# Patient Record
Sex: MALE | Race: BLACK OR AFRICAN AMERICAN | NOT HISPANIC OR LATINO | Employment: UNEMPLOYED | URBAN - METROPOLITAN AREA
[De-identification: names, ages, dates, MRNs, and addresses within clinical notes are randomized per-mention and may not be internally consistent; named-entity substitution may affect disease eponyms.]

---

## 2018-03-02 ENCOUNTER — DOCUMENTATION (OUTPATIENT)
Dept: NEUROSURGERY | Facility: CLINIC | Age: 48
End: 2018-03-02

## 2018-03-02 RX ORDER — MIRTAZAPINE 15 MG/1
15 TABLET, ORALLY DISINTEGRATING ORAL NIGHTLY
COMMUNITY
End: 2020-02-11

## 2018-03-02 RX ORDER — ASPIRIN 81 MG/1
81 TABLET, CHEWABLE ORAL DAILY
COMMUNITY
End: 2020-02-11

## 2018-03-02 RX ORDER — ATORVASTATIN CALCIUM 20 MG/1
20 TABLET, FILM COATED ORAL DAILY
COMMUNITY
End: 2023-03-20 | Stop reason: SDUPTHER

## 2018-03-02 RX ORDER — AMLODIPINE BESYLATE 10 MG/1
10 TABLET ORAL DAILY
COMMUNITY

## 2018-03-02 RX ORDER — SERTRALINE HYDROCHLORIDE 100 MG/1
100 TABLET, FILM COATED ORAL DAILY
COMMUNITY
End: 2020-02-11

## 2018-03-02 RX ORDER — LISINOPRIL 20 MG/1
20 TABLET ORAL DAILY
COMMUNITY
End: 2022-09-19

## 2018-03-02 RX ORDER — RANITIDINE 150 MG/1
150 TABLET ORAL 2 TIMES DAILY
COMMUNITY
End: 2020-02-11

## 2018-03-02 RX ORDER — POTASSIUM CHLORIDE 750 MG/1
10 CAPSULE, EXTENDED RELEASE ORAL DAILY
COMMUNITY
End: 2020-02-11

## 2018-03-05 ENCOUNTER — OFFICE VISIT (OUTPATIENT)
Dept: NEUROSURGERY | Facility: CLINIC | Age: 48
End: 2018-03-05

## 2018-03-05 VITALS
DIASTOLIC BLOOD PRESSURE: 74 MMHG | TEMPERATURE: 98.3 F | HEIGHT: 72 IN | OXYGEN SATURATION: 95 % | SYSTOLIC BLOOD PRESSURE: 110 MMHG | BODY MASS INDEX: 25.87 KG/M2 | WEIGHT: 191 LBS | HEART RATE: 72 BPM

## 2018-03-05 DIAGNOSIS — M47.817 LUMBOSACRAL SPONDYLOSIS WITHOUT MYELOPATHY: ICD-10-CM

## 2018-03-05 DIAGNOSIS — M48.061 SPINAL STENOSIS, LUMBAR REGION, WITHOUT NEUROGENIC CLAUDICATION: ICD-10-CM

## 2018-03-05 DIAGNOSIS — M54.50 CHRONIC BILATERAL LOW BACK PAIN WITHOUT SCIATICA: Primary | ICD-10-CM

## 2018-03-05 DIAGNOSIS — G89.29 CHRONIC BILATERAL LOW BACK PAIN WITHOUT SCIATICA: Primary | ICD-10-CM

## 2018-03-05 DIAGNOSIS — Z98.1 S/P LUMBAR FUSION: ICD-10-CM

## 2018-03-05 PROCEDURE — 99244 OFF/OP CNSLTJ NEW/EST MOD 40: CPT | Performed by: NEUROLOGICAL SURGERY

## 2018-03-05 RX ORDER — NAPROXEN 500 MG/1
500 TABLET ORAL 2 TIMES DAILY WITH MEALS
Qty: 60 TABLET | Refills: 3 | Status: SHIPPED | OUTPATIENT
Start: 2018-03-05 | End: 2020-02-11

## 2018-03-05 NOTE — PROGRESS NOTES
Patient: Claude Masterson  :  1970  Chart #:  [id number]    Date of Service: 18    Chief Complaint:   Chief Complaint   Patient presents with   • Back Pain       Back Pain   This is a new (Mr. Masterson is new with me today.  He is  a pleasant 47 year old male who presents today with neck and back pain.) problem. Episode onset: I operated on this patient in  to correct L5 spondylolithesis and a grade II spondylosis.  On 03/02/10 he broke his sacral screws and they were replaced with iliac screws.  Episode frequency: Patient states that recently he had a stroke.  The problem has been gradually worsening (He complains of bilateral hip and groin pain.  He also complains of bilateral leg pain.  He has normal sensation in his feet bilaterally.) since onset. The pain is present in the lumbar spine (He is left handed.  He has pain when his lower extremities are raised.). The quality of the pain is described as aching. Radiates to: Both legs to the feet;  may be worse with standing and walking. The pain is at a severity of 6/10. The pain is mild (His pain is mild to moderate.). The pain is worse during the day. The symptoms are aggravated by position and standing (He is not able to walk for any distance because of his back pain.). Stiffness is present in the morning. Associated symptoms include numbness and weakness. (He had a R Cerebral stroke affecting the left side of his body in 2017.  He is currently doing PT for stroke rehabilitation.) Risk factors include lack of exercise (progressive osteoarthritis). Treatments tried: PT. The treatment provided no relief.     Radiographic Images:   MRI of the lumbar spine dated  shows a solid arthrodesis at L5-S1 with minimal residual spondylolisthesis.  His alignment is excellent.  He has disc dessication and narrowing at the L1-L2, L2-L3 and L3-L4 region.  He has spinal stenosis at L2-L3 and L3-L4.  Hew has type I modic changes at L2-L3 and L3-L4.    Past  Medical History:   Diagnosis Date   • Cerebrovascular disease    • DDD (degenerative disc disease), cervical    • Hypertension    • Stroke      Current Outpatient Prescriptions   Medication Sig Dispense Refill   • amLODIPine (NORVASC) 10 MG tablet Take 10 mg by mouth Daily.     • aspirin 81 MG chewable tablet Chew 81 mg Daily.     • atorvastatin (LIPITOR) 20 MG tablet Take 20 mg by mouth Daily.     • Cyanocobalamin 1000 MCG capsule Take 1,000 mg by mouth Every 30 (Thirty) Days.     • lisinopril (PRINIVIL,ZESTRIL) 20 MG tablet Take 20 mg by mouth Daily.     • mirtazapine (REMERON SOL-TAB) 15 MG disintegrating tablet Take 15 mg by mouth Every Night.     • Ped Multivitamins-Fl-Iron (MULTIVITAMIN WITH FLUORIDE/IRON) 0.25-10 MG/ML solution solution Take  by mouth Daily.     • potassium chloride (MICRO-K) 10 MEQ CR capsule Take 10 mEq by mouth Daily.     • raNITIdine (ZANTAC) 150 MG tablet Take 150 mg by mouth 2 (Two) Times a Day.     • sertraline (ZOLOFT) 100 MG tablet Take 100 mg by mouth Daily.     • naproxen (NAPROSYN) 500 MG tablet Take 1 tablet by mouth 2 (Two) Times a Day With Meals. 60 tablet 3     No current facility-administered medications for this visit.       No Known Allergies  Social History     Social History   • Marital status: Single     Social History Main Topics   • Smoking status: Never Smoker   • Smokeless tobacco: Never Used   • Alcohol use No   • Drug use: No   • Sexual activity: Defer     Family History   Problem Relation Age of Onset   • No Known Problems Mother    • No Known Problems Father      Past Surgical History:   Procedure Laterality Date   • BACK SURGERY  2000    L5-S1   • BACK SURGERY  2009    L5-S1 replace hardware     Review of Systems   Musculoskeletal: Positive for arthralgias, back pain, myalgias, neck pain and neck stiffness.   Neurological: Positive for weakness and numbness.   Psychiatric/Behavioral: Positive for agitation. The patient is nervous/anxious and is hyperactive.   "  All other systems reviewed and are negative.    Vitals:    03/05/18 0958   BP: 110/74   BP Location: Right arm   Patient Position: Sitting   Pulse: 72   Temp: 98.3 °F (36.8 °C)   TempSrc: Temporal Artery    SpO2: 95%   Weight: 86.6 kg (191 lb)   Height: 182.9 cm (72\")     Physical Exam  Neurologic Exam  Physical Exam   Constitutional: The patient is oriented to person, place, and time.  The patient appears well-developed and well-nourished and in no distress.   Neat  healthy male  HENT:   Head: Normocephalic.   Right Ear: Hearing normal.   Left Ear: Hearing normal.   Mouth/Throat: Uvula is midline, oropharynx is clear and moist and mucous membranes are normal.   Eyes: Conjunctivae, EOM and lids are normal. Pupils are equal, round, and reactive to light.   Fundoscopic exam:  The right eye shows no papilledema.   The left eye shows no papilledema.   Pupils 2 mm; Fundi normal   Neck: Trachea normal and normal range of motion. No thyroid mass present.   Cardiovascular: Regular rhythm and normal heart sounds.   No murmur heard.  Pulses:  Carotid pulses are 2+ on the right side, and 2+ on the left side.  Radial pulses are 2+ on the right side, and 2+ on the left side.   Dorsalis pedis pulse is 2+ on the right side  Posterior tibial pulse is 2+ on the left side  Pulmonary/Chest: Effort normal and breath sounds normal.   Musculoskeletal:   Lumbar back: The patient exhibits pain with movement. The patient exhibits slight decreased range of motion, no deformity and no spasm.  Healed lumbar incision   Mild stiffness; SLR increased low back pain; Hip ROM normal        Neurologic Exam      Mental Status   Oriented to person, place, and time.   Attention: normal. Concentration: normal.   Speech: speech is normal   Level of consciousness: alert  Knowledge: good and consistent with education.   Normal comprehension.      Cranial Nerves   Cranial nerves II through XII intact.     Motor Exam   Muscle bulk: normal  Overall muscle " tone: normal except increased in L arm and leg  No Pronator Drift    Strength   Strength 5/5 throughout.      Sensory Exam   Light touch normal.   Proprioception normal.      Gait, Coordination, and Reflexes      Gait: Spastic L leg     Tremor   Resting tremor: absent  Intention tremor: absent  Action tremor: absent     Reflexes   Right biceps: 2+  Left biceps: 2+  Right triceps: 2+  Left triceps: 2+  Right patellar: 2+  Left patellar: 2+  Right achilles: 2+  Left achilles: 2+  Left sided reflexes slightly more brisk than Right side.  No Babinski signs  Right Vergara: absent  Left Vergara: absent  Right ankle clonus: absent  Left ankle clonus: absent  Low frequency tremor in L hand since stroke  GIOVANY normal on R and diminished on L; L handed      Claude was seen today for back pain.    Diagnoses and all orders for this visit:    Chronic bilateral low back pain without sciatica  -     naproxen (NAPROSYN) 500 MG tablet; Take 1 tablet by mouth 2 (Two) Times a Day With Meals.  -     XR Spine Lumbar AP & Lateral With Flex & Ext; Future    Lumbosacral spondylosis without myelopathy  -     naproxen (NAPROSYN) 500 MG tablet; Take 1 tablet by mouth 2 (Two) Times a Day With Meals.  -     XR Spine Lumbar AP & Lateral With Flex & Ext; Future    Spinal stenosis, lumbar region, without neurogenic claudication  Comments:  L2-L4  Orders:  -     naproxen (NAPROSYN) 500 MG tablet; Take 1 tablet by mouth 2 (Two) Times a Day With Meals.  -     XR Spine Lumbar AP & Lateral With Flex & Ext; Future    S/P lumbar fusion  Comments:  L4-S1  Orders:  -     naproxen (NAPROSYN) 500 MG tablet; Take 1 tablet by mouth 2 (Two) Times a Day With Meals.  -     XR Spine Lumbar AP & Lateral With Flex & Ext; Future    Other orders  -     SCANNED - IMAGING  -     SCANNED PATHOLOGY  -     SCANNED - IMAGING  -     SCANNED - IMAGING  -     SCANNED - IMAGING  -     SCANNED - IMAGING  -     SCANNED - IMAGING  -     SCANNED - IMAGING  -     SCANNED - IMAGING  -      SCANNED - IMAGING  -     SCANNED - IMAGING      Plan:  Order lumbar spine x-rays with F&E views;  Prescribe naproxen 500 mg po bid with food;  Continue ranitidine daily;  Continue PT;  Monitor symptoms for review at next visit in 6 weeks.      I, Dr. Elmer Metzger, personally performed the services described in the documentation as scribed in my presence, and the documentation is both accurate and complete.    Elmer Metzger MD

## 2018-03-26 ENCOUNTER — HOSPITAL ENCOUNTER (OUTPATIENT)
Dept: GENERAL RADIOLOGY | Facility: HOSPITAL | Age: 48
Discharge: HOME | End: 2018-03-26
Attending: NEUROLOGICAL SURGERY | Admitting: NEUROLOGICAL SURGERY

## 2018-03-26 DIAGNOSIS — M48.061 SPINAL STENOSIS, LUMBAR REGION, WITHOUT NEUROGENIC CLAUDICATION: ICD-10-CM

## 2018-03-26 DIAGNOSIS — M54.50 CHRONIC BILATERAL LOW BACK PAIN WITHOUT SCIATICA: ICD-10-CM

## 2018-03-26 DIAGNOSIS — G89.29 CHRONIC BILATERAL LOW BACK PAIN WITHOUT SCIATICA: ICD-10-CM

## 2018-03-26 DIAGNOSIS — Z98.1 S/P LUMBAR FUSION: ICD-10-CM

## 2018-03-26 DIAGNOSIS — M47.817 LUMBOSACRAL SPONDYLOSIS WITHOUT MYELOPATHY: ICD-10-CM

## 2018-03-26 PROCEDURE — 72110 X-RAY EXAM L-2 SPINE 4/>VWS: CPT

## 2018-03-27 ENCOUNTER — OFFICE VISIT (OUTPATIENT)
Dept: NEUROSURGERY | Facility: CLINIC | Age: 48
End: 2018-03-27

## 2018-03-27 VITALS
WEIGHT: 193.6 LBS | HEIGHT: 72 IN | SYSTOLIC BLOOD PRESSURE: 110 MMHG | HEART RATE: 62 BPM | OXYGEN SATURATION: 94 % | BODY MASS INDEX: 26.22 KG/M2 | TEMPERATURE: 97.6 F | DIASTOLIC BLOOD PRESSURE: 64 MMHG

## 2018-03-27 DIAGNOSIS — G89.29 CHRONIC BILATERAL LOW BACK PAIN WITHOUT SCIATICA: Primary | ICD-10-CM

## 2018-03-27 DIAGNOSIS — M47.817 LUMBOSACRAL SPONDYLOSIS WITHOUT MYELOPATHY: ICD-10-CM

## 2018-03-27 DIAGNOSIS — M48.061 SPINAL STENOSIS, LUMBAR REGION, WITHOUT NEUROGENIC CLAUDICATION: ICD-10-CM

## 2018-03-27 DIAGNOSIS — Z98.1 S/P LUMBAR FUSION: ICD-10-CM

## 2018-03-27 DIAGNOSIS — M54.50 CHRONIC BILATERAL LOW BACK PAIN WITHOUT SCIATICA: Primary | ICD-10-CM

## 2018-03-27 DIAGNOSIS — M79.18 MYOFASCIAL PAIN SYNDROME: ICD-10-CM

## 2018-03-27 PROCEDURE — 99213 OFFICE O/P EST LOW 20 MIN: CPT | Performed by: NEUROLOGICAL SURGERY

## 2018-03-27 RX ORDER — NABUMETONE 750 MG/1
750 TABLET, FILM COATED ORAL 2 TIMES DAILY
Qty: 60 TABLET | Refills: 3 | Status: SHIPPED | OUTPATIENT
Start: 2018-03-27 | End: 2020-02-11

## 2018-03-27 RX ORDER — DOXEPIN HYDROCHLORIDE 10 MG/1
10 CAPSULE ORAL NIGHTLY
Qty: 30 CAPSULE | Refills: 3 | Status: SHIPPED | OUTPATIENT
Start: 2018-03-27 | End: 2020-02-11

## 2018-03-27 RX ORDER — HYDROCODONE BITARTRATE AND ACETAMINOPHEN 7.5; 325 MG/1; MG/1
1 TABLET ORAL EVERY 8 HOURS PRN
COMMUNITY
End: 2018-10-19

## 2018-03-27 NOTE — PROGRESS NOTES
Patient: Claude Masterson  :  1970  Chart #:  [id number]    Date of Service: 18    Chief Complaint:   Chief Complaint   Patient presents with   • Back Pain       Back Pain   Chronicity: Mr. Masterson returns today for a follow-up on his back pain. The current episode started 1 to 4 weeks ago (On 09 he had a L5-S1 transforminal interbody fusion with capstone cage.). The problem occurs constantly (Most of his pain is in the thoracic region.). The problem is unchanged. The pain is present in the lumbar spine and thoracic spine (worst pain is in thoracic region). The quality of the pain is described as aching. The pain does not radiate. The pain is at a severity of 4/10. The pain is mild. The pain is worse during the day. The symptoms are aggravated by position and standing. Stiffness is present in the morning. Associated symptoms include numbness. (He has trouble sleeping due to the back pain.) He has tried bed rest, heat and NSAIDs (He has been using naproxen which has not helped;  he has used meloxicam in past without much relief.) for the symptoms. The treatment provided mild relief.       Radiographic Images:   MRI of the lumbar spine shows a s/p L4 to the sacrum iliac with fusion.  His instrumentation is intact.  He has degenerative disc disease at L2-L3.  He is fused solid.  There are no halos around the screws.       Past Medical History:   Diagnosis Date   • Cerebrovascular disease    • DDD (degenerative disc disease), cervical    • Hypertension    • Stroke      Current Outpatient Prescriptions   Medication Sig Dispense Refill   • amLODIPine (NORVASC) 10 MG tablet Take 10 mg by mouth Daily.     • aspirin 81 MG chewable tablet Chew 81 mg Daily.     • atorvastatin (LIPITOR) 20 MG tablet Take 20 mg by mouth Daily.     • Cyanocobalamin 1000 MCG capsule Take 1,000 mg by mouth Every 30 (Thirty) Days.     • HYDROcodone-acetaminophen (NORCO) 7.5-325 MG per tablet Take 1 tablet by mouth Every 8 (Eight) Hours  "As Needed for Moderate Pain .     • lisinopril (PRINIVIL,ZESTRIL) 20 MG tablet Take 20 mg by mouth Daily.     • mirtazapine (REMERON SOL-TAB) 15 MG disintegrating tablet Take 15 mg by mouth Every Night.     • naproxen (NAPROSYN) 500 MG tablet Take 1 tablet by mouth 2 (Two) Times a Day With Meals. 60 tablet 3   • Ped Multivitamins-Fl-Iron (MULTIVITAMIN WITH FLUORIDE/IRON) 0.25-10 MG/ML solution solution Take  by mouth Daily.     • potassium chloride (MICRO-K) 10 MEQ CR capsule Take 10 mEq by mouth Daily.     • raNITIdine (ZANTAC) 150 MG tablet Take 150 mg by mouth 2 (Two) Times a Day.     • sertraline (ZOLOFT) 100 MG tablet Take 100 mg by mouth Daily.     • doxepin (SINEquan) 10 MG capsule Take 1 capsule by mouth Every Night. 30 capsule 3   • nabumetone (RELAFEN) 750 MG tablet Take 1 tablet by mouth 2 (Two) Times a Day. With food 60 tablet 3     No current facility-administered medications for this visit.       No Known Allergies  Social History     Social History   • Marital status: Single     Social History Main Topics   • Smoking status: Never Smoker   • Smokeless tobacco: Never Used   • Alcohol use No   • Drug use: No   • Sexual activity: Defer     Other Topics Concern   • Not on file     Family History   Problem Relation Age of Onset   • No Known Problems Mother    • No Known Problems Father      Past Surgical History:   Procedure Laterality Date   • BACK SURGERY  2000    L5-S1   • BACK SURGERY  2009    L5-S1 replace hardware     Review of Systems   Constitutional: Positive for activity change.   Musculoskeletal: Positive for arthralgias, back pain, myalgias and neck pain.   Neurological: Positive for numbness.   All other systems reviewed and are negative.    Vitals:    03/27/18 0918   BP: 110/64   BP Location: Right arm   Patient Position: Sitting   Pulse: 62   Temp: 97.6 °F (36.4 °C)   TempSrc: Temporal Artery    SpO2: 94%   Weight: 87.8 kg (193 lb 9.6 oz)   Height: 182.9 cm (72.01\")     Physical " Exam  Neurologic Exam  Constitutional: The patient is oriented to person, place, and time.  The patient appears well-developed and well-nourished and in no distress.   Neat  healthy male  Neck: Trachea normal and normal range of motion. No thyroid mass present.   Cardiovascular: Regular rhythm   Pulses:  Carotid pulses are 2+ on the right side, and 2+ on the left side.  Radial pulses are 2+ on the right side, and 2+ on the left side.   Dorsalis pedis pulse is 2+ on the right side  Posterior tibial pulse is 2+ on the left side  Pulmonary/Chest: Effort normal   Musculoskeletal:   Thoracic back:  Normal alignment;  Pain in mid thoracic region;  Paraspinal muscles sore to palpation.  Lumbar back: The patient exhibits pain with movement. The patient exhibits slight decreased range of motion, no deformity and no spasm.  Healed lumbar incision   Mild stiffness; SLR increased low back pain; Hip ROM normal        Neurologic Exam      Mental Status   Oriented to person, place, and time.   Attention: normal. Concentration: normal.   Speech: speech is normal   Level of consciousness: alert  Knowledge: good and consistent with education.   Normal comprehension.      Cranial Nerves   Cranial nerves II through XII intact.      Motor Exam   Muscle bulk: normal  Overall muscle tone: normal except increased in L arm and leg  No Pronator Drift     Strength   Strength 5/5 throughout.      Sensory Exam   Light touch normal.   Proprioception normal.      Gait, Coordination, and Reflexes      Gait: Spastic L leg     Tremor   Resting tremor: absent  Intention tremor: absent  Action tremor: absent     Reflexes   Right biceps: 2+  Left biceps: 2+  Right triceps: 2+  Left triceps: 2+  Right patellar: 2+  Left patellar: 2+  Right achilles: 2+  Left achilles: 2+  Left sided reflexes slightly more brisk than Right side.  No Babinski signs  Right Vergara: absent  Left Vergara: absent  Right ankle clonus: absent  Left ankle clonus: absent  Low  frequency tremor in L hand since stroke  GIOVANY normal on R and diminished on L; L handed      [place] was seen today for back pain.    Diagnoses and all orders for this visit:    Chronic bilateral low back pain without sciatica  -     nabumetone (RELAFEN) 750 MG tablet; Take 1 tablet by mouth 2 (Two) Times a Day. With food  -     doxepin (SINEquan) 10 MG capsule; Take 1 capsule by mouth Every Night.    Lumbosacral spondylosis without myelopathy  -     nabumetone (RELAFEN) 750 MG tablet; Take 1 tablet by mouth 2 (Two) Times a Day. With food  -     doxepin (SINEquan) 10 MG capsule; Take 1 capsule by mouth Every Night.    Spinal stenosis, lumbar region, without neurogenic claudication  -     nabumetone (RELAFEN) 750 MG tablet; Take 1 tablet by mouth 2 (Two) Times a Day. With food  -     doxepin (SINEquan) 10 MG capsule; Take 1 capsule by mouth Every Night.    S/P lumbar fusion  -     nabumetone (RELAFEN) 750 MG tablet; Take 1 tablet by mouth 2 (Two) Times a Day. With food  -     doxepin (SINEquan) 10 MG capsule; Take 1 capsule by mouth Every Night.    Myofascial pain syndrome      Plan:   Prescribe relafen for arthritis pain and doxepin for myofascial pain and sleep;  Apply ice to back pain;  Return for re-evaluation in 4 - 6 weeks.  I do not recommend any surgery at this time.  I have discussed this with the patient.    I, Dr. Elmer Metzger, personally performed the services described in the documentation as scribed in my presence, and the documentation is both accurate and complete.    Elmer Metzger MD

## 2018-04-25 ENCOUNTER — OFFICE VISIT (OUTPATIENT)
Dept: NEUROLOGY | Facility: CLINIC | Age: 48
End: 2018-04-25

## 2018-04-25 VITALS
SYSTOLIC BLOOD PRESSURE: 120 MMHG | HEART RATE: 59 BPM | WEIGHT: 193 LBS | DIASTOLIC BLOOD PRESSURE: 74 MMHG | HEIGHT: 72 IN | OXYGEN SATURATION: 98 % | BODY MASS INDEX: 26.14 KG/M2

## 2018-04-25 DIAGNOSIS — R48.1: ICD-10-CM

## 2018-04-25 DIAGNOSIS — I69.30 SEQUELAE, POST-STROKE: Primary | ICD-10-CM

## 2018-04-25 PROCEDURE — 99244 OFF/OP CNSLTJ NEW/EST MOD 40: CPT | Performed by: PSYCHIATRY & NEUROLOGY

## 2018-04-25 RX ORDER — CLOPIDOGREL BISULFATE 75 MG/1
75 TABLET ORAL DAILY
COMMUNITY

## 2018-04-25 RX ORDER — TIZANIDINE 4 MG/1
4 TABLET ORAL NIGHTLY PRN
COMMUNITY
End: 2018-06-18 | Stop reason: ALTCHOICE

## 2018-04-25 NOTE — PROGRESS NOTES
[facility] NEUROLOGY [place] CONSULTATION   History of Present Illness     Date: 4/25/2018    Patient Identification  Claude Masterson is a 47 y.o. male.    Patient information was obtained from patient.  History/Exam limitations: none.    CONSULTATION requested by: Marilin Us APRN      Chief Complaint   Stroke (NP, in office today for consult. Stroke 08/27/2017, pt c/o that sx have worsened. Sx include weakness in Right leg, LBP, pt states if he sits a certain way his toe will start twitching )      History of Present Illness   Mr. Masterson is a 47 year old male with a pmh of hypertension and a stroke on 08/27/17. When he woke up on the morning of his stroke he could not feel his entire left side so he had his girlfriend take him to [place]. He stayed for 7 days and was then referred to [facility] where he stayed for 14 days. Since leaving [facility] his symptoms have gotten worse. He reports tingling in his right foot, bilateral lower extremity numbness, and pain in his back and neck. He has regained some mobility in his legs, but is still not as mobile as he was before the stroke. He reports no family history of stroke but both parents do have high blood pressures. He reports his blood pressure has been known to run 180/110 but it has been under control recently usually staying around 110/70.    PMH:   Past Medical History:   Diagnosis Date   • Anxiety and depression    • Arthritis    • Cerebrovascular disease    • DDD (degenerative disc disease), cervical    • Hyperlipidemia    • Hypertension    • Stroke        Past Surgical History:   Past Surgical History:   Procedure Laterality Date   • BACK SURGERY  2000    L5-S1   • BACK SURGERY  2009    L5-S1 replace hardware       Family Hisotry:   Family History   Problem Relation Age of Onset   • Hypertension Mother    • Hypertension Father        Social History:   Social History     Social History   • Marital status: Single     Spouse name: N/A   • Number  of children: N/A   • Years of education: N/A     Occupational History   • Not on file.     Social History Main Topics   • Smoking status: Never Smoker   • Smokeless tobacco: Never Used   • Alcohol use No   • Drug use: No   • Sexual activity: Defer     Other Topics Concern   • Not on file     Social History Narrative   • No narrative on file       Medications:   Current Outpatient Prescriptions   Medication Sig Dispense Refill   • amLODIPine (NORVASC) 10 MG tablet Take 10 mg by mouth Daily.     • aspirin 81 MG chewable tablet Chew 81 mg Daily.     • atorvastatin (LIPITOR) 20 MG tablet Take 20 mg by mouth Daily.     • clopidogrel (PLAVIX) 75 MG tablet Take 75 mg by mouth Daily.     • doxepin (SINEquan) 10 MG capsule Take 1 capsule by mouth Every Night. 30 capsule 3   • HYDROcodone-acetaminophen (NORCO) 7.5-325 MG per tablet Take 1 tablet by mouth Every 8 (Eight) Hours As Needed for Moderate Pain .     • lisinopril (PRINIVIL,ZESTRIL) 20 MG tablet Take 20 mg by mouth Daily.     • mirtazapine (REMERON SOL-TAB) 15 MG disintegrating tablet Take 15 mg by mouth Every Night.     • nabumetone (RELAFEN) 750 MG tablet Take 1 tablet by mouth 2 (Two) Times a Day. With food 60 tablet 3   • naproxen (NAPROSYN) 500 MG tablet Take 1 tablet by mouth 2 (Two) Times a Day With Meals. 60 tablet 3   • Ped Multivitamins-Fl-Iron (MULTIVITAMIN WITH FLUORIDE/IRON) 0.25-10 MG/ML solution solution Take  by mouth Daily.     • potassium chloride (MICRO-K) 10 MEQ CR capsule Take 10 mEq by mouth Daily.     • raNITIdine (ZANTAC) 150 MG tablet Take 150 mg by mouth 2 (Two) Times a Day.     • sertraline (ZOLOFT) 100 MG tablet Take 100 mg by mouth Daily.     • tiZANidine (ZANAFLEX) 4 MG tablet Take 4 mg by mouth At Night As Needed for Muscle Spasms.     • Cyanocobalamin 1000 MCG capsule Take 1,000 mg by mouth Every 30 (Thirty) Days.       No current facility-administered medications for this visit.        Allergy: No Known Allergies    Review of  "Systems:  Review of Systems   Constitutional: Positive for fatigue. Negative for chills and fever.   HENT: Negative for congestion, ear pain, hearing loss, rhinorrhea and sore throat.    Eyes: Negative for pain, discharge and redness.   Respiratory: Negative for cough, shortness of breath, wheezing and stridor.    Cardiovascular: Negative for chest pain, palpitations and leg swelling.   Gastrointestinal: Negative for abdominal pain, constipation, nausea and vomiting.   Endocrine: Negative for cold intolerance, heat intolerance and polyphagia.   Genitourinary: Negative for dysuria, flank pain, frequency and urgency.   Musculoskeletal: Positive for gait problem. Negative for joint swelling, myalgias, neck pain and neck stiffness.   Skin: Negative for pallor, rash and wound.   Allergic/Immunologic: Negative for environmental allergies.   Neurological: Positive for weakness and numbness. Negative for dizziness, tremors, seizures, syncope, facial asymmetry, speech difficulty, light-headedness and headaches.   Hematological: Negative for adenopathy.   Psychiatric/Behavioral: Negative for confusion and hallucinations. The patient is not nervous/anxious.        Physical Exam     Vitals:    04/25/18 1107   BP: 120/74   Pulse: 59   SpO2: 98%   Weight: 87.5 kg (193 lb)   Height: 182.9 cm (72\")     GENERAL: Patient is pleasant, cooperative, appears to be stated age.  Body habitus is endomorphic.  SKIN AND EXTREMITIES:  No skin rashes or lesions are noted.  No cyanosis, clubbing or edema of the extremities.    HEAD:  Head is normocephalic and atraumatic.    NECK: Neck are non-tender without thyromegaly or adenopathy.  Carotic upstrokes are 1+/4.  No cranial or cervical bruits.  The neck is supple with a full range of motion.   ENT: palate elevate symmetrically, no evidence of high arch palate, tongue midline erythema in posterior pharynx, Mallampati Classification Class III   CARDIOVASCULAR:  Regular rate and rhythm with normal " S1 and S2 without rub or gallop.  RESPIRATORY:  Clear to auscultation without wheezes or crackle   ABDOMEN:  Soft and non-tender, positive bowel sound without hepatosplenomegaly  BACK:  Back is straight without midline defect.    PSYCH:  Higher cortical function/mental status:  The patient is alert.  She is oriented x3 to time, place and person.  Recent and the remote memory appear normal.  The patient has a good fund of knowledge.  There is no visual or auditory hallucination or suicidal or homicidal ideation.  SPEECH:There is no gross evidence of aphasia, dysarthria or agnosia.      CRANIAL NERVES:  Pupils are 4mm, equal round reactive to light, reacting briskly to 2mm without afferent pupillary defect.  Visual fields are intact to confrontation testing.  Fundoscopic examination reveals sharp disk margins with normal vasculature.  No papilledema, hemorrhages or exudates.  Extraocular movements are full and smooth with normal pursuits and saccades.  No nystagmus noted.  The face is symmetric. palate elevate symmetrically, Tongue midline, positive gag reflex. The remainder of the cranial nerves are intact and symmetrical.    MOTOR: Pronator drift on Barr[place] on the left   Deep Tendon Reflexes: are 2/4 and symmetrical in the upper extremities, 2/4 and symmetrical at the knees and 1/4 and symmetrical at the Achilles tendon.  Plantar responses were down-going bilaterally.    SENSATION: Decreased primary sensation in the left upper and lower extremities and decreased cortical sensation including agraphesthesia and astereoagnosia    COORDINATION AND GAIT:  The patient walks with a narrow-based gait.  Patient is able to heel-toe and tandem walk forward and backwards without difficulty.  Romberg and monopedal  Romberg are negative.    MUSCULOSKELETAL: Range of motion normal, no clubbing, cyanosis, or edema.  No joint swelling.            Records Reviewed: I have personally reviewed his previous medical record.    Claude was  seen today for stroke.    Diagnoses and all orders for this visit:    Sequelae, post-stroke    Sensory agnosia        Treatments:  1.  Continue patient on aspirin  2.  Continue patient on Lipitor  3.  Periodic monitoring of his blood pressure  4.  Continue antihypertensive medication  Discussion:  The most common cause of stroke is an embolus that occludes an artery in the brain. This usually arises from the carotid arteries or from the vertebral-basilar arteries. Another common cause is from a thrombus that has originated from the left atrium of the heart due to atrial fibrillation. Other reasons include excessive narrowing of large vessels resulting from an atherosclerotic plaque and increased blood viscosity caused by hypercoagulable state. Stroke is related to other medical conditions such as hypertension, heart disease (especially atrial fibrillation , migraine, hypercholesterolemia, and diabetes mellitus .  Risk factors for Stroke include  · Family history of stroke substantially increases risk.  · People 55 years or older are at higher risk.  · Males have a slightly higher risk of stroke than females but females are more likely to die from a stroke.  · High blood pressure  · Diabetes Mellitus  ·  Americans generally tend to have a high risk of dying from a stroke, chiefly due to high blood pressure and uncontrolled diabetes.  · Cigarette smoking  The mainstay of treatment following acute recovery from a stroke depends on the underlying cause. It is not always immediately possible to tell the difference between a CVA and a TIA. A TIA can be considered as the last warning. The reason for the condition should be immediately examined by imaging of the brain.   · To reduce risk of recurrence, patients are recommended to undergo lifestyle changes including quitting smoking, losing weight, eating more fruits and vegetables and exercising regularly  · The use of anti-coagulant  medications, heparin  and  warfarin, or anti-platelet medications such as aspirin may be warranted. The initial treatment is aspirin, second line is clopidogrel (PLAVIX), third line is ticlopidine. If stroke is recurrent after aspirin treatment, the combination of aspirin and dipyridamole is needed (Aggrenox).      This Document is signed by Matheus Aleman MD, FAAN, FAASM April 25, 20189:11 PM

## 2018-06-18 ENCOUNTER — OFFICE VISIT (OUTPATIENT)
Dept: NEUROSURGERY | Facility: CLINIC | Age: 48
End: 2018-06-18

## 2018-06-18 VITALS
TEMPERATURE: 98.1 F | DIASTOLIC BLOOD PRESSURE: 82 MMHG | HEIGHT: 72 IN | BODY MASS INDEX: 25.79 KG/M2 | WEIGHT: 190.4 LBS | SYSTOLIC BLOOD PRESSURE: 120 MMHG

## 2018-06-18 DIAGNOSIS — M54.50 CHRONIC BILATERAL LOW BACK PAIN WITHOUT SCIATICA: Primary | ICD-10-CM

## 2018-06-18 DIAGNOSIS — G89.29 CHRONIC BILATERAL LOW BACK PAIN WITHOUT SCIATICA: Primary | ICD-10-CM

## 2018-06-18 DIAGNOSIS — Z98.1 S/P LUMBAR FUSION: ICD-10-CM

## 2018-06-18 PROCEDURE — 99214 OFFICE O/P EST MOD 30 MIN: CPT | Performed by: PHYSICIAN ASSISTANT

## 2018-06-18 RX ORDER — METHYLPREDNISOLONE 4 MG/1
TABLET ORAL
Qty: 21 TABLET | Refills: 0 | Status: SHIPPED | OUTPATIENT
Start: 2018-06-18 | End: 2018-07-23

## 2018-06-18 RX ORDER — CYCLOBENZAPRINE HCL 10 MG
10 TABLET ORAL 3 TIMES DAILY PRN
Qty: 45 TABLET | Refills: 0 | Status: SHIPPED | OUTPATIENT
Start: 2018-06-18 | End: 2020-02-11

## 2018-06-18 NOTE — PATIENT INSTRUCTIONS
Patient will follow up in 4-6 weeks with Dr. Metzger. He will undergo PT, start medrol dose edson, and try a muscle relaxant in the interim.

## 2018-06-18 NOTE — PROGRESS NOTES
"F/U Visit  Subjective   Patient ID: Claude Masterson is a 47 y.o. male  [id number]    Chief Complaint   Patient presents with   • Back Pain   • Neck Pain     History of Present Illness  Patient is a pleasant 47 year old male who is well known to Dr. Metzger's service. Patient was last seen in march with complaints of thoracic pain which started 1-4 weeks ago. He has previously underwent a L5-S1 tranforminal interbody fusion with capstone cage in 2009. MRI at the time showed a s/p L4 to the sacrum iliac with fusion along with DDD at L2-3.     Patient returns today reporting that he is still experiencing thoracic pain. Describes the pain as sharp.shooting. He reports stretching does not help. He reports that occasionally heat helps and when he lays in certain positions. He reports that the pain is constant, but later said that it comes and go's. He denies any weakness. He says that otherwise the pain is not positional.     Patient also endorses right wrist \"pulsations\" that come and go. He also endorses right foot and right toe numbness/tingling. He states that these symptoms began 2.5 weeks ago.     No B/B incontinence.     Past Medical History:   Diagnosis Date   • Anxiety and depression    • Arthritis    • Cerebrovascular disease    • DDD (degenerative disc disease), cervical    • Hyperlipidemia    • Hypertension    • Stroke        No Known Allergies    Current Outpatient Prescriptions:   •  amLODIPine (NORVASC) 10 MG tablet, Take 10 mg by mouth Daily., Disp: , Rfl:   •  aspirin 81 MG chewable tablet, Chew 81 mg Daily., Disp: , Rfl:   •  atorvastatin (LIPITOR) 20 MG tablet, Take 20 mg by mouth Daily., Disp: , Rfl:   •  clopidogrel (PLAVIX) 75 MG tablet, Take 75 mg by mouth Daily., Disp: , Rfl:   •  Cyanocobalamin 1000 MCG capsule, Take 1,000 mg by mouth Every 30 (Thirty) Days., Disp: , Rfl:   •  doxepin (SINEquan) 10 MG capsule, Take 1 capsule by mouth Every Night., Disp: 30 capsule, Rfl: 3  •  HYDROcodone-acetaminophen " (NORCO) 7.5-325 MG per tablet, Take 1 tablet by mouth Every 8 (Eight) Hours As Needed for Moderate Pain ., Disp: , Rfl:   •  lisinopril (PRINIVIL,ZESTRIL) 20 MG tablet, Take 20 mg by mouth Daily., Disp: , Rfl:   •  mirtazapine (REMERON SOL-TAB) 15 MG disintegrating tablet, Take 15 mg by mouth Every Night., Disp: , Rfl:   •  nabumetone (RELAFEN) 750 MG tablet, Take 1 tablet by mouth 2 (Two) Times a Day. With food, Disp: 60 tablet, Rfl: 3  •  naproxen (NAPROSYN) 500 MG tablet, Take 1 tablet by mouth 2 (Two) Times a Day With Meals., Disp: 60 tablet, Rfl: 3  •  Ped Multivitamins-Fl-Iron (MULTIVITAMIN WITH FLUORIDE/IRON) 0.25-10 MG/ML solution solution, Take  by mouth Daily., Disp: , Rfl:   •  potassium chloride (MICRO-K) 10 MEQ CR capsule, Take 10 mEq by mouth Daily., Disp: , Rfl:   •  raNITIdine (ZANTAC) 150 MG tablet, Take 150 mg by mouth 2 (Two) Times a Day., Disp: , Rfl:   •  sertraline (ZOLOFT) 100 MG tablet, Take 100 mg by mouth Daily., Disp: , Rfl:   •  tiZANidine (ZANAFLEX) 4 MG tablet, Take 4 mg by mouth At Night As Needed for Muscle Spasms., Disp: , Rfl:   Social History   Substance Use Topics   • Smoking status: Never Smoker   • Smokeless tobacco: Never Used   • Alcohol use No     Review of Systems   Constitutional: Negative for activity change, appetite change, chills, diaphoresis, fatigue, fever and unexpected weight change.   HENT: Negative for congestion, dental problem, drooling, ear discharge, ear pain, facial swelling, hearing loss, mouth sores, nosebleeds, postnasal drip, rhinorrhea, sinus pressure, sneezing, sore throat, tinnitus, trouble swallowing and voice change.    Eyes: Negative for photophobia, pain, discharge, redness, itching and visual disturbance.   Respiratory: Negative for apnea, cough, choking, chest tightness, shortness of breath, wheezing and stridor.    Cardiovascular: Negative for chest pain, palpitations and leg swelling.   Gastrointestinal: Negative for abdominal distention,  abdominal pain, anal bleeding, blood in stool, constipation, diarrhea, nausea, rectal pain and vomiting.   Endocrine: Negative for cold intolerance, heat intolerance, polydipsia, polyphagia and polyuria.   Genitourinary: Negative for decreased urine volume, difficulty urinating, dysuria, enuresis, flank pain, frequency, genital sores, hematuria and urgency.   Musculoskeletal: Positive for arthralgias, back pain, myalgias and neck pain. Negative for gait problem, joint swelling and neck stiffness.   Skin: Negative for color change, pallor, rash and wound.   Allergic/Immunologic: Negative for environmental allergies, food allergies and immunocompromised state.   Neurological: Positive for numbness. Negative for dizziness, tremors, seizures, syncope, facial asymmetry, speech difficulty, weakness, light-headedness and headaches.   Hematological: Negative for adenopathy. Does not bruise/bleed easily.   Psychiatric/Behavioral: Positive for agitation. Negative for behavioral problems, confusion, decreased concentration, dysphoric mood, hallucinations, self-injury, sleep disturbance and suicidal ideas. The patient is nervous/anxious. The patient is not hyperactive.        Physical Exam   Constitutional: He is oriented to person, place, and time. He appears well-developed and well-nourished.   HENT:   Head: Normocephalic and atraumatic.   Neck: Normal range of motion.   Pulmonary/Chest: Effort normal.   Musculoskeletal: Normal range of motion. He exhibits no edema, tenderness or deformity.   Neurological: He is alert and oriented to person, place, and time. He has normal strength. He displays normal reflexes. No sensory deficit. He exhibits normal muscle tone. Coordination normal.   Skin: Skin is warm and dry.   Psychiatric: He has a normal mood and affect. His speech is normal and behavior is normal. Judgment and thought content normal.     /82 (BP Location: Right arm, Patient Position: Sitting)   Temp 98.1 °F (36.7  "°C) (Temporal Artery )   Ht 182.9 cm (72.01\")   Wt 86.4 kg (190 lb 6.4 oz)   BMI 25.82 kg/m²     Neurologic Exam     Mental Status   Oriented to person, place, and time.   Attention: normal.   Speech: speech is normal     Motor Exam   Muscle bulk: normal  Overall muscle tone: normal  Right arm tone: normal  Left arm tone: normal  Right leg tone: normal  Left leg tone: normal    Strength   Strength 5/5 throughout.     Gait, Coordination, and Reflexes     Reflexes   Right ankle clonus: absent  Left ankle clonus: absent      Independent Review of Radiographic Studies:   Xray imaging:   IMPRESSION:  There is no evidence of malalignment in either flexion or  extension imaging with degenerative changes more pronounced at the L2-L3  and L3-L4 levels in the interval. There is no hardware complication.    Medical Decision Making:   Patient is a pleasant 47 year old male who presents today for ongoing thoracic back pain along with now associated numbness/tingling in high right leg and right big toe. From the office notes, this pain has been ongoing since March. Patient reports that the N/T is recent over the past 2.5 weeks. He denies any associated weakness. Patient's story was slightly inconsistent and     I have ordered a course of PT, put in for flexeril TID PRN, and a course of steroids.     Patient was advised to stop all NSAID products, except his aspirin. And to take steroids/aspirin with food.     Patient will follow up in 4-6 weeks or sooner if symptoms dramatically worsen. Patient is in agreement with plan.     Maureen Lopez MA                  "

## 2018-07-13 ENCOUNTER — TREATMENT (OUTPATIENT)
Dept: PHYSICAL THERAPY | Facility: CLINIC | Age: 48
End: 2018-07-13

## 2018-07-13 DIAGNOSIS — M54.50 CHRONIC BILATERAL LOW BACK PAIN WITHOUT SCIATICA: ICD-10-CM

## 2018-07-13 DIAGNOSIS — G89.29 CHRONIC BILATERAL LOW BACK PAIN WITHOUT SCIATICA: ICD-10-CM

## 2018-07-13 DIAGNOSIS — G89.29 CHRONIC MIDLINE THORACIC BACK PAIN: Primary | ICD-10-CM

## 2018-07-13 DIAGNOSIS — M54.6 CHRONIC MIDLINE THORACIC BACK PAIN: Primary | ICD-10-CM

## 2018-07-13 PROCEDURE — 97162 PT EVAL MOD COMPLEX 30 MIN: CPT | Performed by: PHYSICAL THERAPIST

## 2018-07-23 ENCOUNTER — OFFICE VISIT (OUTPATIENT)
Dept: NEUROSURGERY | Facility: CLINIC | Age: 48
End: 2018-07-23

## 2018-07-23 VITALS
HEIGHT: 72 IN | OXYGEN SATURATION: 94 % | HEART RATE: 79 BPM | BODY MASS INDEX: 26.79 KG/M2 | TEMPERATURE: 98.1 F | DIASTOLIC BLOOD PRESSURE: 80 MMHG | WEIGHT: 197.8 LBS | SYSTOLIC BLOOD PRESSURE: 112 MMHG

## 2018-07-23 DIAGNOSIS — M79.18 MYOFASCIAL PAIN SYNDROME: Primary | ICD-10-CM

## 2018-07-23 DIAGNOSIS — Z98.1 S/P LUMBAR FUSION: ICD-10-CM

## 2018-07-23 DIAGNOSIS — M54.50 CHRONIC BILATERAL LOW BACK PAIN WITHOUT SCIATICA: ICD-10-CM

## 2018-07-23 DIAGNOSIS — M47.817 LUMBOSACRAL SPONDYLOSIS WITHOUT MYELOPATHY: ICD-10-CM

## 2018-07-23 DIAGNOSIS — G89.29 CHRONIC BILATERAL LOW BACK PAIN WITHOUT SCIATICA: ICD-10-CM

## 2018-07-23 DIAGNOSIS — M48.061 SPINAL STENOSIS, LUMBAR REGION, WITHOUT NEUROGENIC CLAUDICATION: ICD-10-CM

## 2018-07-23 PROCEDURE — 99213 OFFICE O/P EST LOW 20 MIN: CPT | Performed by: NEUROLOGICAL SURGERY

## 2018-07-23 RX ORDER — DOXEPIN HYDROCHLORIDE 25 MG/1
25 CAPSULE ORAL NIGHTLY
Qty: 30 CAPSULE | Refills: 3 | Status: SHIPPED | OUTPATIENT
Start: 2018-07-23 | End: 2020-02-11

## 2018-07-23 NOTE — PROGRESS NOTES
Patient: Claude Masterson  :  1970  Chart #:  [id number]    Date of Service: 18    Chief Complaint:   Chief Complaint   Patient presents with   • Back Pain       Back Pain   Chronicity:  Mr. Masterson returns today for a follow-up on his back pain. The current episode started 1 to 4 weeks ago (On 09 he had a L5-S1 transforminal interbody fusion with capstone cage. The current episode started more than 1 month ago. The problem occurs daily. The problem has been gradually improving since onset. The pain is present in the lumbar spine. The quality of the pain is described as aching. The pain radiates to the left thigh (Patient complains of pain in his LLE.  He also complains of hip and groin pain. ). The pain is at a severity of 4/10. The pain is mild. The symptoms are aggravated by position (He has pain when his lower extremities are raised.). Risk factors include lack of exercise and sedentary lifestyle. He has tried bed rest, home exercises and walking for the symptoms. The treatment provided moderate relief.       Radiographic Images:    MRI of the lumbar spine dated  shows a solid arthrodesis at L5-S1 with minimal residual spondylolisthesis.  His alignment is excellent.  He has disc dessication and narrowing at the L1-L2, L2-L3 and L3-L4 region.  He has spinal stenosis at L2-L3 and L3-L4.  Hew has type I modic changes at L2-L3 and L3-L4.     Lumbar spine x-rays done 3/26/18 show pedicle screw instrumentation from L4 to S1 with iliac screw fixation.  There is spondylosis at L2L3 and L3L4.      Past Medical History:   Diagnosis Date   • Anxiety    • Anxiety and depression    • Arthritis    • Cerebrovascular disease    • DDD (degenerative disc disease), cervical    • Depression    • Hyperlipidemia    • Hypertension    • Injury of back    • Stroke (CMS/[facility])      Current Outpatient Prescriptions   Medication Sig Dispense Refill   • amLODIPine (NORVASC) 10 MG tablet Take 10 mg by mouth Daily.     •  aspirin 81 MG chewable tablet Chew 81 mg Daily.     • atorvastatin (LIPITOR) 20 MG tablet Take 20 mg by mouth Daily.     • clopidogrel (PLAVIX) 75 MG tablet Take 75 mg by mouth Daily.     • Cyanocobalamin 1000 MCG capsule Take 1,000 mg by mouth Every 30 (Thirty) Days.     • cyclobenzaprine (FLEXERIL) 10 MG tablet Take 1 tablet by mouth 3 (Three) Times a Day As Needed for Muscle Spasms. 45 tablet 0   • doxepin (SINEquan) 10 MG capsule Take 1 capsule by mouth Every Night. 30 capsule 3   • HYDROcodone-acetaminophen (NORCO) 7.5-325 MG per tablet Take 1 tablet by mouth Every 8 (Eight) Hours As Needed for Moderate Pain .     • lisinopril (PRINIVIL,ZESTRIL) 20 MG tablet Take 20 mg by mouth Daily.     • mirtazapine (REMERON SOL-TAB) 15 MG disintegrating tablet Take 15 mg by mouth Every Night.     • nabumetone (RELAFEN) 750 MG tablet Take 1 tablet by mouth 2 (Two) Times a Day. With food 60 tablet 3   • naproxen (NAPROSYN) 500 MG tablet Take 1 tablet by mouth 2 (Two) Times a Day With Meals. 60 tablet 3   • Ped Multivitamins-Fl-Iron (MULTIVITAMIN WITH FLUORIDE/IRON) 0.25-10 MG/ML solution solution Take  by mouth Daily.     • potassium chloride (MICRO-K) 10 MEQ CR capsule Take 10 mEq by mouth Daily.     • raNITIdine (ZANTAC) 150 MG tablet Take 150 mg by mouth 2 (Two) Times a Day.     • sertraline (ZOLOFT) 100 MG tablet Take 100 mg by mouth Daily.     • doxepin (SINEquan) 25 MG capsule Take 1 capsule by mouth Every Night. 30 capsule 3     No current facility-administered medications for this visit.       No Known Allergies  Social History     Social History   • Marital status: Single     Social History Main Topics   • Smoking status: Never Smoker   • Smokeless tobacco: Never Used   • Alcohol use No   • Drug use: No   • Sexual activity: Defer     Other Topics Concern   • Not on file     Family History   Problem Relation Age of Onset   • Hypertension Mother    • Hypertension Father      Past Surgical History:   Procedure  "Laterality Date   • BACK SURGERY  2000    L5-S1   • BACK SURGERY  2009    L5-S1 replace hardware     Review of Systems   Constitutional: Positive for activity change.   Musculoskeletal: Positive for arthralgias, back pain and neck pain.   Psychiatric/Behavioral: Positive for agitation.   All other systems reviewed and are negative.    Vitals:    07/23/18 1113   BP: 112/80   BP Location: Right arm   Patient Position: Sitting   Pulse: 79   Temp: 98.1 °F (36.7 °C)   TempSrc: Temporal Artery    SpO2: 94%   Weight: 89.7 kg (197 lb 12.8 oz)   Height: 182.9 cm (72.01\")     Physical Exam  Neurologic Exam  Constitutional: The patient is oriented to person, place, and time.  The patient appears well-developed and well-nourished and in no distress.   Neat  healthy male  Neck: Trachea normal and normal range of motion. No thyroid mass present.   Cardiovascular: Regular rhythm   Pulses:  Carotid pulses are 2+ on the right side, and 2+ on the left side.  Radial pulses are 2+ on the right side, and 2+ on the left side.   Dorsalis pedis pulse is 2+ on the right side  Posterior tibial pulse is 2+ on the left side  Pulmonary/Chest: Effort normal   Musculoskeletal:   Thoracic back:  Normal alignment;  Pain in mid thoracic region;  Paraspinal muscles sore to palpation.  Lumbar back: The patient exhibits pain with movement. The patient exhibits slight decreased range of motion, no deformity and no spasm.  Healed lumbar incision   Mild stiffness; SLR increased low back pain; Hip ROM diminished and painful bilaterally.          Neurologic Exam      Mental Status   Oriented to person, place, and time.   Attention: normal. Concentration: normal.   Speech: speech is normal   Level of consciousness: alert  Knowledge: good and consistent with education.   Normal comprehension.      Cranial Nerves   Cranial nerves II through XII intact.      Motor Exam   Muscle bulk: normal  Overall muscle tone: normal except increased in L arm and leg  No " Pronator Drift     Strength   Strength 5/5 throughout.      Sensory Exam   Light touch normal.   Proprioception normal.      Gait, Coordination, and Reflexes      Gait: Spastic L leg     Tremor   Resting tremor: absent  Intention tremor: absent  Action tremor: absent     Reflexes   Right biceps: 2+  Left biceps: 2+  Right triceps: 2+  Left triceps: 2+  Right patellar: 2+  Left patellar: 2+  Right achilles: 2+  Left achilles: 2+  Left sided reflexes slightly more brisk than Right side.  No Babinski signs  Right Vergara: absent  Left Vergara: absent  Right ankle clonus: absent  Left ankle clonus: absent  Low frequency tremor in L hand since stroke  GIOVANY normal on R and diminished on L; L handed      Claude was seen today for back pain.    Diagnoses and all orders for this visit:    Myofascial pain syndrome  -     doxepin (SINEquan) 25 MG capsule; Take 1 capsule by mouth Every Night.  -     XR hips bilateral w or wo pelvis 2 view; Future    Chronic bilateral low back pain without sciatica  -     doxepin (SINEquan) 25 MG capsule; Take 1 capsule by mouth Every Night.  -     XR hips bilateral w or wo pelvis 2 view; Future    Lumbosacral spondylosis without myelopathy  -     doxepin (SINEquan) 25 MG capsule; Take 1 capsule by mouth Every Night.  -     XR hips bilateral w or wo pelvis 2 view; Future    Spinal stenosis, lumbar region, without neurogenic claudication  -     doxepin (SINEquan) 25 MG capsule; Take 1 capsule by mouth Every Night.  -     XR hips bilateral w or wo pelvis 2 view; Future    S/P lumbar fusion  -     doxepin (SINEquan) 25 MG capsule; Take 1 capsule by mouth Every Night.  -     XR hips bilateral w or wo pelvis 2 view; Future        Plan:  Increase doxepin;  Continue with pain management;  Order x-rays of both hips;  Return for re-evaluation after x-rays.      I, Dr. Elmer Metzger, personally performed the services described in the documentation as scribed in my presence, and the documentation is both  accurate and complete.    Elmer Metzger MD

## 2018-07-25 ENCOUNTER — HOSPITAL ENCOUNTER (OUTPATIENT)
Dept: GENERAL RADIOLOGY | Facility: HOSPITAL | Age: 48
Discharge: HOME | End: 2018-07-25
Attending: NEUROLOGICAL SURGERY | Admitting: NEUROLOGICAL SURGERY

## 2018-07-25 DIAGNOSIS — M47.817 LUMBOSACRAL SPONDYLOSIS WITHOUT MYELOPATHY: ICD-10-CM

## 2018-07-25 DIAGNOSIS — M79.18 MYOFASCIAL PAIN SYNDROME: ICD-10-CM

## 2018-07-25 DIAGNOSIS — M54.50 CHRONIC BILATERAL LOW BACK PAIN WITHOUT SCIATICA: ICD-10-CM

## 2018-07-25 DIAGNOSIS — Z98.1 S/P LUMBAR FUSION: ICD-10-CM

## 2018-07-25 DIAGNOSIS — G89.29 CHRONIC BILATERAL LOW BACK PAIN WITHOUT SCIATICA: ICD-10-CM

## 2018-07-25 DIAGNOSIS — M48.061 SPINAL STENOSIS, LUMBAR REGION, WITHOUT NEUROGENIC CLAUDICATION: ICD-10-CM

## 2018-07-25 PROCEDURE — 73521 X-RAY EXAM HIPS BI 2 VIEWS: CPT

## 2018-08-16 ENCOUNTER — OFFICE VISIT (OUTPATIENT)
Dept: NEUROSURGERY | Facility: CLINIC | Age: 48
End: 2018-08-16

## 2018-08-16 VITALS — TEMPERATURE: 97.5 F | WEIGHT: 194.2 LBS | HEIGHT: 72 IN | BODY MASS INDEX: 26.3 KG/M2

## 2018-08-16 DIAGNOSIS — M51.36 DDD (DEGENERATIVE DISC DISEASE), LUMBAR: ICD-10-CM

## 2018-08-16 DIAGNOSIS — M48.061 SPINAL STENOSIS OF LUMBAR REGION, UNSPECIFIED WHETHER NEUROGENIC CLAUDICATION PRESENT: Primary | ICD-10-CM

## 2018-08-16 PROCEDURE — 99213 OFFICE O/P EST LOW 20 MIN: CPT | Performed by: NEUROLOGICAL SURGERY

## 2018-08-16 NOTE — PROGRESS NOTES
Abhinav Masterson is a 48 y.o. male who presents for follow up of  low back pain.     The patient is a 48-year-old man from [place] who had a fusion at L5-S1 by Dr. Metzger in 2000, and a revision in 2009 with pedicle screws from L4 to S1 and pelvic iliac screws to supplement the fixation, apparently because of pseudoarthrosis at L5-S1.  His symptoms are now back pain with some April or buttock pain.  He does not describe unilateral radiculopathy nor does he describe neurogenic claudication.  He had a lumbar injection by Dr. DEAN Miranda which did not help much.  He is due to see Dr. Moisés Waite next month.  He has a history of a left sided stroke last year that is improving but still hasn't fully regain function on the left.    His lumbar MRI scan shows severe degenerative disc at L2-3 and L3-4 with moderate stenosis, and earlier degenerative disc change at L1-2.  L5-S1 has a thoroughly developed interbody fusion, and L4-5 has what appears the posterior lateral fusion with pedicle screw fixation.    The following portions of the patient's history were reviewed, updated as appropriate and approved: allergies, current medications, past family history, past medical history, past social history, past surgical history, review of systems and problem list.     Review of Systems   Constitutional: Negative for activity change, appetite change, chills, diaphoresis, fatigue, fever and unexpected weight change.   HENT: Negative for congestion, dental problem, drooling, ear discharge, ear pain, facial swelling, hearing loss, mouth sores, nosebleeds, postnasal drip, rhinorrhea, sinus pressure, sneezing, sore throat, tinnitus, trouble swallowing and voice change.    Eyes: Negative for photophobia, pain, discharge, redness, itching and visual disturbance.   Respiratory: Negative for apnea, cough, choking, chest tightness, shortness of breath, wheezing and stridor.    Cardiovascular: Negative for chest pain, palpitations and  leg swelling.   Gastrointestinal: Negative for abdominal distention, abdominal pain, anal bleeding, blood in stool, constipation, diarrhea, nausea, rectal pain and vomiting.   Endocrine: Negative for cold intolerance, heat intolerance, polydipsia, polyphagia and polyuria.   Genitourinary: Positive for testicular pain. Negative for decreased urine volume, difficulty urinating, dysuria, enuresis, flank pain, frequency, genital sores, hematuria and urgency.   Musculoskeletal: Positive for back pain, myalgias, neck pain and neck stiffness. Negative for arthralgias, gait problem and joint swelling.   Skin: Negative for color change, pallor, rash and wound.   Allergic/Immunologic: Negative for environmental allergies, food allergies and immunocompromised state.   Neurological: Negative for dizziness, tremors, seizures, syncope, facial asymmetry, speech difficulty, weakness, light-headedness, numbness and headaches.   Hematological: Negative for adenopathy. Does not bruise/bleed easily.   Psychiatric/Behavioral: Positive for agitation and dysphoric mood. Negative for behavioral problems, confusion, decreased concentration, hallucinations, self-injury, sleep disturbance and suicidal ideas. The patient is not nervous/anxious and is not hyperactive.        Objective    NEUROLOGICAL EXAMINATION:    No obvious spasticity of gait.  No focal measurable weakness at this point on the left side, although he has some athetoid kinds of movement with his left hand.    Assessment   Low back pain related to advanced degenerative disc and facet disease at L2-3 and L3-4 with a moderate degree of stenosis.  Prior fusion L4 4 to S1.  No classic neurogenic claudication or unilateral radiculopathy.       Plan   Further surgery at this point on the lumbar spine does not appear necessary, and he should continue with his current pain management techniques, including hot tub and stretching.  At some point in the future if the stenosis becomes a  primary problem with neurogenic claudication decompression at L2-3 and L3-4 may become necessary.       Ruben Salgado MD

## 2018-10-19 ENCOUNTER — APPOINTMENT (OUTPATIENT)
Dept: MRI IMAGING | Facility: HOSPITAL | Age: 48
End: 2018-10-19

## 2018-10-19 ENCOUNTER — HOSPITAL ENCOUNTER (EMERGENCY)
Facility: HOSPITAL | Age: 48
Discharge: HOME | End: 2018-10-19
Attending: EMERGENCY MEDICINE | Admitting: EMERGENCY MEDICINE

## 2018-10-19 ENCOUNTER — APPOINTMENT (OUTPATIENT)
Dept: CT IMAGING | Facility: HOSPITAL | Age: 48
End: 2018-10-19

## 2018-10-19 ENCOUNTER — APPOINTMENT (OUTPATIENT)
Dept: GENERAL RADIOLOGY | Facility: HOSPITAL | Age: 48
End: 2018-10-19

## 2018-10-19 VITALS
HEIGHT: 72 IN | OXYGEN SATURATION: 95 % | DIASTOLIC BLOOD PRESSURE: 89 MMHG | WEIGHT: 185 LBS | HEART RATE: 89 BPM | RESPIRATION RATE: 20 BRPM | SYSTOLIC BLOOD PRESSURE: 128 MMHG | BODY MASS INDEX: 25.06 KG/M2 | TEMPERATURE: 98.7 F

## 2018-10-19 DIAGNOSIS — M62.838 MUSCLE SPASM: ICD-10-CM

## 2018-10-19 DIAGNOSIS — R53.1 LEFT-SIDED WEAKNESS: Primary | ICD-10-CM

## 2018-10-19 DIAGNOSIS — Z86.73 OLD CEREBROVASCULAR ACCIDENT (CVA) WITHOUT LATE EFFECT: ICD-10-CM

## 2018-10-19 LAB
ALT SERPL W P-5'-P-CCNC: 16 U/L (ref 7–40)
APTT PPP: 30.3 SECONDS (ref 24–31)
AST SERPL-CCNC: 18 U/L (ref 0–33)
BASOPHILS # BLD AUTO: 0.02 10*3/MM3 (ref 0–0.2)
BASOPHILS NFR BLD AUTO: 0.3 % (ref 0–1)
BUN BLDA-MCNC: 19 MG/DL (ref 8–26)
CA-I BLDA-SCNC: 1.28 MMOL/L (ref 1.2–1.32)
CHLORIDE BLDA-SCNC: 106 MMOL/L (ref 98–109)
CO2 BLDA-SCNC: 21 MMOL/L (ref 24–29)
CREAT BLDA-MCNC: 1.2 MG/DL (ref 0.6–1.3)
DEPRECATED RDW RBC AUTO: 40 FL (ref 37–54)
EOSINOPHIL # BLD AUTO: 0.03 10*3/MM3 (ref 0–0.3)
EOSINOPHIL NFR BLD AUTO: 0.4 % (ref 0–3)
ERYTHROCYTE [DISTWIDTH] IN BLOOD BY AUTOMATED COUNT: 12.5 % (ref 11.3–14.5)
GLUCOSE BLDC GLUCOMTR-MCNC: 93 MG/DL (ref 70–130)
HCT VFR BLD AUTO: 42.2 % (ref 38.9–50.9)
HCT VFR BLDA CALC: 44 % (ref 38–51)
HGB BLD-MCNC: 14.4 G/DL (ref 13.1–17.5)
HGB BLDA-MCNC: 15 G/DL (ref 12–17)
HOLD SPECIMEN: NORMAL
HOLD SPECIMEN: NORMAL
IMM GRANULOCYTES # BLD: 0.02 10*3/MM3 (ref 0–0.03)
IMM GRANULOCYTES NFR BLD: 0.3 % (ref 0–0.6)
INR PPP: 1 (ref 0.8–1.2)
LYMPHOCYTES # BLD AUTO: 2.25 10*3/MM3 (ref 0.6–4.8)
LYMPHOCYTES NFR BLD AUTO: 28.4 % (ref 24–44)
MCH RBC QN AUTO: 30.1 PG (ref 27–31)
MCHC RBC AUTO-ENTMCNC: 34.1 G/DL (ref 32–36)
MCV RBC AUTO: 88.3 FL (ref 80–99)
MONOCYTES # BLD AUTO: 0.6 10*3/MM3 (ref 0–1)
MONOCYTES NFR BLD AUTO: 7.6 % (ref 0–12)
NEUTROPHILS # BLD AUTO: 5.01 10*3/MM3 (ref 1.5–8.3)
NEUTROPHILS NFR BLD AUTO: 63.3 % (ref 41–71)
PLATELET # BLD AUTO: 215 10*3/MM3 (ref 150–450)
PMV BLD AUTO: 10.5 FL (ref 6–12)
POTASSIUM BLDA-SCNC: 3.8 MMOL/L (ref 3.5–4.9)
PROTHROMBIN TIME: 12.2 SECONDS (ref 12.8–15.2)
RBC # BLD AUTO: 4.78 10*6/MM3 (ref 4.2–5.76)
SODIUM BLDA-SCNC: 143 MMOL/L (ref 138–146)
TROPONIN I SERPL-MCNC: 0 NG/ML (ref 0–0.07)
WBC NRBC COR # BLD: 7.91 10*3/MM3 (ref 3.5–10.8)
WHOLE BLOOD HOLD SPECIMEN: NORMAL
WHOLE BLOOD HOLD SPECIMEN: NORMAL

## 2018-10-19 PROCEDURE — 85014 HEMATOCRIT: CPT

## 2018-10-19 PROCEDURE — 85730 THROMBOPLASTIN TIME PARTIAL: CPT | Performed by: EMERGENCY MEDICINE

## 2018-10-19 PROCEDURE — 96376 TX/PRO/DX INJ SAME DRUG ADON: CPT

## 2018-10-19 PROCEDURE — 70498 CT ANGIOGRAPHY NECK: CPT

## 2018-10-19 PROCEDURE — 85610 PROTHROMBIN TIME: CPT

## 2018-10-19 PROCEDURE — 84450 TRANSFERASE (AST) (SGOT): CPT | Performed by: EMERGENCY MEDICINE

## 2018-10-19 PROCEDURE — 84460 ALANINE AMINO (ALT) (SGPT): CPT | Performed by: EMERGENCY MEDICINE

## 2018-10-19 PROCEDURE — 70450 CT HEAD/BRAIN W/O DYE: CPT

## 2018-10-19 PROCEDURE — 25010000002 LORAZEPAM PER 2 MG

## 2018-10-19 PROCEDURE — 25010000002 LORAZEPAM PER 2 MG: Performed by: EMERGENCY MEDICINE

## 2018-10-19 PROCEDURE — 96374 THER/PROPH/DIAG INJ IV PUSH: CPT

## 2018-10-19 PROCEDURE — 84484 ASSAY OF TROPONIN QUANT: CPT

## 2018-10-19 PROCEDURE — 80047 BASIC METABLC PNL IONIZED CA: CPT

## 2018-10-19 PROCEDURE — 70551 MRI BRAIN STEM W/O DYE: CPT

## 2018-10-19 PROCEDURE — 70496 CT ANGIOGRAPHY HEAD: CPT

## 2018-10-19 PROCEDURE — 0042T HC CT CEREBRAL PERFUSION W/WO CONTRAST: CPT

## 2018-10-19 PROCEDURE — 85025 COMPLETE CBC W/AUTO DIFF WBC: CPT | Performed by: EMERGENCY MEDICINE

## 2018-10-19 PROCEDURE — 99285 EMERGENCY DEPT VISIT HI MDM: CPT

## 2018-10-19 PROCEDURE — 93005 ELECTROCARDIOGRAM TRACING: CPT | Performed by: EMERGENCY MEDICINE

## 2018-10-19 PROCEDURE — 71045 X-RAY EXAM CHEST 1 VIEW: CPT

## 2018-10-19 PROCEDURE — 0 IOPAMIDOL PER 1 ML: Performed by: EMERGENCY MEDICINE

## 2018-10-19 RX ORDER — LORAZEPAM 2 MG/ML
1 INJECTION INTRAMUSCULAR ONCE
Status: COMPLETED | OUTPATIENT
Start: 2018-10-19 | End: 2018-10-19

## 2018-10-19 RX ORDER — METHADONE HYDROCHLORIDE 5 MG/1
5 TABLET ORAL EVERY 8 HOURS PRN
COMMUNITY
End: 2020-02-11

## 2018-10-19 RX ORDER — TIZANIDINE 4 MG/1
4 TABLET ORAL NIGHTLY PRN
COMMUNITY
End: 2020-02-11

## 2018-10-19 RX ORDER — LORAZEPAM 2 MG/ML
INJECTION INTRAMUSCULAR
Status: COMPLETED
Start: 2018-10-19 | End: 2018-10-19

## 2018-10-19 RX ORDER — SODIUM CHLORIDE 0.9 % (FLUSH) 0.9 %
10 SYRINGE (ML) INJECTION AS NEEDED
Status: DISCONTINUED | OUTPATIENT
Start: 2018-10-19 | End: 2018-10-20 | Stop reason: HOSPADM

## 2018-10-19 RX ORDER — TIZANIDINE 4 MG/1
4 TABLET ORAL ONCE
Status: COMPLETED | OUTPATIENT
Start: 2018-10-19 | End: 2018-10-19

## 2018-10-19 RX ADMIN — LORAZEPAM 1 MG: 2 INJECTION INTRAMUSCULAR; INTRAVENOUS at 19:31

## 2018-10-19 RX ADMIN — LORAZEPAM 1 MG: 2 INJECTION INTRAMUSCULAR; INTRAVENOUS at 16:41

## 2018-10-19 RX ADMIN — IOPAMIDOL 115 ML: 755 INJECTION, SOLUTION INTRAVENOUS at 17:08

## 2018-10-19 RX ADMIN — TIZANIDINE 4 MG: 4 TABLET ORAL at 18:27

## 2018-10-19 RX ADMIN — LORAZEPAM 1 MG: 2 INJECTION INTRAMUSCULAR at 16:41

## 2018-10-19 NOTE — ED PROVIDER NOTES
"Abhinav Masterson is a 48 y.o. male with a hx of CVA who presents to the ED with c/o a neurologic problem. The patient reports sudden onset of some numbness and weakness to his right extremities around 1400, as well as worsened left sided numbness and weakness from baseline, which he states is residual from a prior CVA last year. He notes that he has had intermittent \"spasms\" of left sided deficits since his CVA, but states that it is worse and more constant than normal today. He also complains of some nasuea, but denies difficulty with his speech, facial asymmetry, SoA, chest pain, or any other acute complaints at this time. Patient is currently on Plavix.         History provided by:  Patient  Neurologic Problem   The patient's primary symptoms include focal sensory loss, focal weakness and weakness (Left). The patient's pertinent negatives include no slurred speech. This is a new problem. The current episode started today. The problem is unchanged. There was right-sided focality noted. Associated symptoms include nausea. Pertinent negatives include no chest pain, fever or shortness of breath. Past treatments include nothing.       Review of Systems   Constitutional: Negative for chills and fever.   Respiratory: Negative for shortness of breath.    Cardiovascular: Negative for chest pain.   Gastrointestinal: Positive for nausea.   Neurological: Positive for focal weakness, weakness (Left) and numbness (Left). Negative for facial asymmetry and speech difficulty.   All other systems reviewed and are negative.      Past Medical History:   Diagnosis Date   • Anxiety    • Anxiety and depression    • Arthritis    • Cerebrovascular disease    • DDD (degenerative disc disease), cervical    • Depression    • Hyperlipidemia    • Hypertension    • Injury of back    • Stroke (CMS/HCC)        No Known Allergies    Past Surgical History:   Procedure Laterality Date   • BACK SURGERY  2000    L5-S1   • BACK SURGERY  " 2009    L5-S1 replace hardware       Family History   Problem Relation Age of Onset   • Hypertension Mother    • Hypertension Father        Social History     Social History   • Marital status: Single     Social History Main Topics   • Smoking status: Never Smoker   • Smokeless tobacco: Never Used   • Alcohol use No   • Drug use: No   • Sexual activity: Defer     Other Topics Concern   • Not on file         Objective   Physical Exam   Constitutional: He is oriented to person, place, and time. He appears well-developed and well-nourished. No distress.   HENT:   Head: Normocephalic and atraumatic.   Nose: Nose normal.   Eyes: Conjunctivae are normal. No scleral icterus.   Neck: Normal range of motion. Neck supple.   Cardiovascular: Normal rate, regular rhythm and normal heart sounds.    No murmur heard.  Pulmonary/Chest: Effort normal and breath sounds normal. No respiratory distress.   Abdominal: Soft. There is tenderness in the left lower quadrant.   Neurological: He is alert and oriented to person, place, and time. No cranial nerve deficit.   No pronator drift. Finger to nose test on the left shows significant dysmetria. Motor strength is 5/5 proximally and distally in the BLE.  strength minimally weaker on the left. Subjective hyperesthesia of the right leg and arm with light touch.   Skin: Skin is warm and dry.   Psychiatric: He has a normal mood and affect. His behavior is normal.   Nursing note and vitals reviewed.      Procedures         ED Course  ED Course as of Oct 20 0206   Fri Oct 19, 2018   2121 He is much more relaxed since his meds and is relieved to hear of no new brain problem.  [LI]      ED Course User Index  [LI] Lyle Licea MD       Recent Results (from the past 24 hour(s))   POC Protime / INR    Collection Time: 10/19/18  4:36 PM   Result Value Ref Range    Protime 12.2 (L) 12.8 - 15.2 seconds    INR 1.0 0.8 - 1.2   POC CHEM 8    Collection Time: 10/19/18  4:37 PM   Result Value Ref  Range    Glucose 93 70 - 130 mg/dL    BUN 19 8 - 26 mg/dL    Creatinine 1.20 0.60 - 1.30 mg/dL    Sodium 143 138 - 146 mmol/L    Potassium 3.8 3.5 - 4.9 mmol/L    Chloride 106 98 - 109 mmol/L    Total CO2 21 (L) 24 - 29 mmol/L    Hemoglobin 15.0 12.0 - 17.0 g/dL    Hematocrit 44 38 - 51 %    Ionized Calcium 1.28 1.20 - 1.32 mmol/L   aPTT    Collection Time: 10/19/18  4:40 PM   Result Value Ref Range    PTT 30.3 24.0 - 31.0 seconds   AST    Collection Time: 10/19/18  4:40 PM   Result Value Ref Range    AST (SGOT) 18 0 - 33 U/L   ALT    Collection Time: 10/19/18  4:40 PM   Result Value Ref Range    ALT (SGPT) 16 7 - 40 U/L   Light Blue Top    Collection Time: 10/19/18  4:40 PM   Result Value Ref Range    Extra Tube hold for add-on    Green Top (Gel)    Collection Time: 10/19/18  4:40 PM   Result Value Ref Range    Extra Tube Hold for add-ons.    Lavender Top    Collection Time: 10/19/18  4:40 PM   Result Value Ref Range    Extra Tube hold for add-on    Gold Top - SST    Collection Time: 10/19/18  4:40 PM   Result Value Ref Range    Extra Tube Hold for add-ons.    CBC Auto Differential    Collection Time: 10/19/18  4:40 PM   Result Value Ref Range    WBC 7.91 3.50 - 10.80 10*3/mm3    RBC 4.78 4.20 - 5.76 10*6/mm3    Hemoglobin 14.4 13.1 - 17.5 g/dL    Hematocrit 42.2 38.9 - 50.9 %    MCV 88.3 80.0 - 99.0 fL    MCH 30.1 27.0 - 31.0 pg    MCHC 34.1 32.0 - 36.0 g/dL    RDW 12.5 11.3 - 14.5 %    RDW-SD 40.0 37.0 - 54.0 fl    MPV 10.5 6.0 - 12.0 fL    Platelets 215 150 - 450 10*3/mm3    Neutrophil % 63.3 41.0 - 71.0 %    Lymphocyte % 28.4 24.0 - 44.0 %    Monocyte % 7.6 0.0 - 12.0 %    Eosinophil % 0.4 0.0 - 3.0 %    Basophil % 0.3 0.0 - 1.0 %    Immature Grans % 0.3 0.0 - 0.6 %    Neutrophils, Absolute 5.01 1.50 - 8.30 10*3/mm3    Lymphocytes, Absolute 2.25 0.60 - 4.80 10*3/mm3    Monocytes, Absolute 0.60 0.00 - 1.00 10*3/mm3    Eosinophils, Absolute 0.03 0.00 - 0.30 10*3/mm3    Basophils, Absolute 0.02 0.00 - 0.20  10*3/mm3    Immature Grans, Absolute 0.02 0.00 - 0.03 10*3/mm3   POC Troponin, Rapid    Collection Time: 10/19/18  4:45 PM   Result Value Ref Range    Troponin I 0.00 0.00 - 0.07 ng/mL     Note: In addition to lab results from this visit, the labs listed above may include labs taken at another facility or during a different encounter within the last 24 hours. Please correlate lab times with ED admission and discharge times for further clarification of the services performed during this visit.    MRI Brain Without Contrast   Final Result   1.  No acute infarct.   2.  Remote right occipital lobe infarct.   3.  Prominent perivascular space in the right thalamus.      THIS DOCUMENT HAS BEEN ELECTRONICALLY SIGNED BY RIKY MONTES MD      XR Chest 1 View   Final Result   No acute cardiopulmonary abnormality.       DICTATED:   10/19/2018   EDITED/ls :   10/19/2018        This report was finalized on 10/19/2018 5:52 PM by Bj Slaughter.          CT Angiogram Head With & Without Contrast   Final Result   1. No significant cervical carotid disease.   2. No intracranial large-vessel occlusion.   3. No evidence of ischemic penumbra.       DICTATED:   10/19/2018   EDITED/ls :   10/19/2018        This report was finalized on 10/19/2018 5:51 PM by Bj Slaughter.          CT Cerebral Perfusion With & Without Contrast   Final Result   1. No significant cervical carotid disease.   2. No intracranial large-vessel occlusion.   3. No evidence of ischemic penumbra.       DICTATED:   10/19/2018   EDITED/ls :   10/19/2018        This report was finalized on 10/19/2018 5:51 PM by Bj Slaughter.          CT Angiogram Neck With & Without Contrast   Final Result   1. No significant cervical carotid disease.   2. No intracranial large-vessel occlusion.   3. No evidence of ischemic penumbra.       DICTATED:   10/19/2018   EDITED/ls :   10/19/2018        This report was finalized on 10/19/2018 5:51 PM by Bj Slaughter.          CT Head Without Contrast  "Stroke Protocol   Final Result   No acute intracranial abnormality.       Scan performed on 10/19/2018 at 1621 hours. Scan report given to ER   physician and team at scanner in person by Dr. Garcia on 10/19/2018 at 1625   hours.       D:  10/19/2018   E:  10/19/2018               This report was finalized on 10/19/2018 4:49 PM by Dr. Shaka Garcia.            Vitals:    10/19/18 1630 10/19/18 1714   BP: 128/89    BP Location: Right arm    Patient Position: Lying    Pulse: 89    Resp: 20    Temp:  98.7 °F (37.1 °C)   TempSrc:  Oral   SpO2: 95%    Weight: 83.9 kg (185 lb)    Height: 182.9 cm (72\")      Medications   LORazepam (ATIVAN) injection 1 mg (1 mg Intravenous Given 10/19/18 1641)   iopamidol (ISOVUE-370) 76 % injection 150 mL (115 mL Intravenous Given 10/19/18 1708)   tiZANidine (ZANAFLEX) tablet 4 mg (4 mg Oral Given 10/19/18 1827)   LORazepam (ATIVAN) injection 1 mg (1 mg Intravenous Given 10/19/18 1931)     ECG/EMG Results (last 24 hours)     Procedure Component Value Units Date/Time    ECG 12 Lead [[id number]] Collected:  10/19/18 1712     Updated:  10/19/18 1719    Narrative:       Test Reason : Acute Stroke Protocol (onset < 12 hrs)  Blood Pressure : **/** mmHG  Vent. Rate : 077 BPM     Atrial Rate : 077 BPM     P-R Int : 156 ms          QRS Dur : 082 ms      QT Int : 364 ms       P-R-T Axes : 072 064 062 degrees     QTc Int : 411 ms    Normal sinus rhythm  When compared with ECG of 01-FEB-2010 09:50,  Nonspecific T wave abnormality no longer evident in Anterolateral leads  Confirmed by SILVINA MARROQUIN MD ([id number]) on 10/19/2018 5:19:20 PM    Referred By:  ED MD           Confirmed By:SILVINA MARROQUIN MD      musc                MDM    Final diagnoses:   Left-sided weakness   Old cerebrovascular accident (CVA) without late effect   Muscle spasm       Documentation assistance provided by rosanna Alvarez.  Information recorded by the scribe was done at my direction and has been verified and validated by me.   "   Jorge Alvarez  10/19/18 1641       Jorge Alvarez  10/19/18 2124       Lyle Licea MD  10/20/18 0203

## 2018-10-20 NOTE — DISCHARGE INSTRUCTIONS
Keep your appointment with Dr. Aleman on 10/25/18.     Follow up with Dr. Calderon as needed    Try using a heating pad on the areas where you have spasms    Please review the medications you are supposed to be taking according to prior physician recommendations. I have not changed your home medications during this visit. If your discharge instructions indicate that I have changed your home medications, this is not the case, and you should disregard. If you have any questions about the medication you should be taking at home, please call your physician.     Immediately return to the emergency department for any new or worsening symptoms.

## 2019-06-22 ENCOUNTER — HOSPITAL ENCOUNTER (EMERGENCY)
Facility: HOSPITAL | Age: 49
Discharge: HOME | End: 2019-06-22
Attending: EMERGENCY MEDICINE | Admitting: EMERGENCY MEDICINE

## 2019-06-22 VITALS
SYSTOLIC BLOOD PRESSURE: 118 MMHG | BODY MASS INDEX: 25.73 KG/M2 | RESPIRATION RATE: 18 BRPM | OXYGEN SATURATION: 96 % | HEART RATE: 80 BPM | HEIGHT: 72 IN | TEMPERATURE: 98.1 F | WEIGHT: 190 LBS | DIASTOLIC BLOOD PRESSURE: 88 MMHG

## 2019-06-22 DIAGNOSIS — L50.9 HIVES: Primary | ICD-10-CM

## 2019-06-22 PROCEDURE — 63710000001 DIPHENHYDRAMINE PER 50 MG: Performed by: EMERGENCY MEDICINE

## 2019-06-22 PROCEDURE — 99284 EMERGENCY DEPT VISIT MOD MDM: CPT

## 2019-06-22 PROCEDURE — 63710000001 PREDNISONE PER 1 MG: Performed by: EMERGENCY MEDICINE

## 2019-06-22 RX ORDER — DIPHENHYDRAMINE HCL 50 MG
50 CAPSULE ORAL ONCE
Status: COMPLETED | OUTPATIENT
Start: 2019-06-22 | End: 2019-06-22

## 2019-06-22 RX ORDER — OXYCODONE HYDROCHLORIDE AND ACETAMINOPHEN 5; 325 MG/1; MG/1
1 TABLET ORAL EVERY 6 HOURS PRN
COMMUNITY
End: 2020-06-30 | Stop reason: DRUGHIGH

## 2019-06-22 RX ORDER — PREDNISONE 20 MG/1
20 TABLET ORAL 3 TIMES DAILY
Qty: 15 TABLET | Refills: 0 | Status: SHIPPED | OUTPATIENT
Start: 2019-06-22 | End: 2020-02-11

## 2019-06-22 RX ORDER — FAMOTIDINE 40 MG/1
40 TABLET, FILM COATED ORAL DAILY
Qty: 7 TABLET | Refills: 0 | Status: SHIPPED | OUTPATIENT
Start: 2019-06-22 | End: 2019-06-29

## 2019-06-22 RX ORDER — PREDNISONE 20 MG/1
60 TABLET ORAL ONCE
Status: COMPLETED | OUTPATIENT
Start: 2019-06-22 | End: 2019-06-22

## 2019-06-22 RX ORDER — FAMOTIDINE 20 MG/1
20 TABLET, FILM COATED ORAL ONCE
Status: COMPLETED | OUTPATIENT
Start: 2019-06-22 | End: 2019-06-22

## 2019-06-22 RX ORDER — DIPHENHYDRAMINE HCL 25 MG
25 TABLET ORAL EVERY 6 HOURS PRN
Qty: 20 TABLET | Refills: 0 | Status: SHIPPED | OUTPATIENT
Start: 2019-06-22 | End: 2020-02-11

## 2019-06-22 RX ADMIN — PREDNISONE 60 MG: 20 TABLET ORAL at 04:43

## 2019-06-22 RX ADMIN — DIPHENHYDRAMINE HYDROCHLORIDE 50 MG: 50 CAPSULE ORAL at 04:43

## 2019-06-22 RX ADMIN — FAMOTIDINE 20 MG: 20 TABLET ORAL at 04:43

## 2020-02-11 ENCOUNTER — OFFICE VISIT (OUTPATIENT)
Dept: NEUROLOGY | Facility: CLINIC | Age: 50
End: 2020-02-11

## 2020-02-11 ENCOUNTER — LAB (OUTPATIENT)
Dept: LAB | Facility: HOSPITAL | Age: 50
End: 2020-02-11

## 2020-02-11 VITALS
DIASTOLIC BLOOD PRESSURE: 88 MMHG | HEART RATE: 79 BPM | SYSTOLIC BLOOD PRESSURE: 130 MMHG | OXYGEN SATURATION: 98 % | BODY MASS INDEX: 25.73 KG/M2 | WEIGHT: 190 LBS | HEIGHT: 72 IN

## 2020-02-11 DIAGNOSIS — I69.30 SEQUELAE, POST-STROKE: ICD-10-CM

## 2020-02-11 DIAGNOSIS — I69.30 SEQUELAE, POST-STROKE: Primary | ICD-10-CM

## 2020-02-11 LAB
CHOLEST SERPL-MCNC: 150 MG/DL (ref 0–200)
HBA1C MFR BLD: 4.63 % (ref 4.8–5.6)
HDLC SERPL-MCNC: 45 MG/DL (ref 40–60)
LDLC SERPL CALC-MCNC: 83 MG/DL (ref 0–100)
LDLC/HDLC SERPL: 1.84 {RATIO}
TRIGL SERPL-MCNC: 110 MG/DL (ref 0–150)
VLDLC SERPL-MCNC: 22 MG/DL (ref 5–40)

## 2020-02-11 PROCEDURE — 83036 HEMOGLOBIN GLYCOSYLATED A1C: CPT

## 2020-02-11 PROCEDURE — 80061 LIPID PANEL: CPT

## 2020-02-11 PROCEDURE — 36415 COLL VENOUS BLD VENIPUNCTURE: CPT

## 2020-02-11 PROCEDURE — 99215 OFFICE O/P EST HI 40 MIN: CPT | Performed by: PSYCHIATRY & NEUROLOGY

## 2020-02-11 RX ORDER — BACLOFEN 10 MG/1
10 TABLET ORAL 2 TIMES DAILY
Qty: 60 TABLET | Refills: 3 | Status: SHIPPED | OUTPATIENT
Start: 2020-02-11 | End: 2020-09-29 | Stop reason: SDUPTHER

## 2020-02-11 NOTE — PROGRESS NOTES
Subjective:    CC: Claude Masterson is seen today in consultation at the [facility]       HPI:  49-year-old left-handed male previously seen by Dr. Aleman with a history of stroke in 2017, hypertension, anxiety, chronic low back pain status post lumbar fusion in 2008 and 2009 presents for a follow-up for his stroke.  As per patient he had a stroke in August 2017 where he had left-sided numbness and mild weakness as well as loss of peripheral field of vision in the left eye.  His blood pressure was extremely high at the time.  Was admitted to [facility] where he was found to have a PFO that was subsequently closed.  Has been taking Plavix and Lipitor 20 mg since then.  Then in October 2018 patient had another brief episode of right-sided weakness.  He had a CT head that was normal, CT angiogram of the head and neck that did not show any significant intra-or extracranial stenosis and a MRI brain that showed a chronic right occipital infarct but no acute abnormalities.  Patient states that even now he feels slightly weak on the right but he also attributes it to the fact that he is left-handed and does not use his right hand frequently.  Even his left arm feels stiff and weak since the stroke and his thumb twitches all the time.  He was on muscle relaxants but does not take anything currently.  He has also been having trouble with fine motor skills but stopped getting PT a while back.  Patient was smoking but quit entirely in 2017 after the stroke.  Of note-I personally reviewed his MRI brain and Dr. Aleman's notes.    The following portions of the patient's history were reviewed today and updated as of 02/11/2020  : allergies, current medications, past family history, past medical history, past social history, past surgical history and problem list  These document will be scanned to patient's chart.      Current Outpatient Medications:   •  amLODIPine (NORVASC) 10 MG tablet, Take 10 mg by mouth  "Daily., Disp: , Rfl:   •  atorvastatin (LIPITOR) 20 MG tablet, Take 20 mg by mouth Daily., Disp: , Rfl:   •  clopidogrel (PLAVIX) 75 MG tablet, Take 75 mg by mouth Daily., Disp: , Rfl:   •  lisinopril (PRINIVIL,ZESTRIL) 20 MG tablet, Take 20 mg by mouth Daily., Disp: , Rfl:   •  oxyCODONE-acetaminophen (PERCOCET) 5-325 MG per tablet, Take 1 tablet by mouth Every 6 (Six) Hours As Needed., Disp: , Rfl:   •  baclofen (LIORESAL) 10 MG tablet, Take 1 tablet by mouth 2 (Two) Times a Day., Disp: 60 tablet, Rfl: 3   Past Medical History:   Diagnosis Date   • Anxiety    • Anxiety and depression    • Arthritis    • Cerebrovascular disease    • DDD (degenerative disc disease), cervical    • Depression    • Hyperlipidemia    • Hypertension    • Injury of back    • Stroke (CMS/HCC)       Past Surgical History:   Procedure Laterality Date   • BACK SURGERY  2000    L5-S1   • BACK SURGERY  2009    L5-S1 replace hardware      Family History   Problem Relation Age of Onset   • Hypertension Mother    • Hypertension Father       Social History     Socioeconomic History   • Marital status: Single     Spouse name: Not on file   • Number of children: Not on file   • Years of education: Not on file   • Highest education level: Not on file   Tobacco Use   • Smoking status: Never Smoker   • Smokeless tobacco: Never Used   Substance and Sexual Activity   • Alcohol use: No   • Drug use: No   • Sexual activity: Defer     Review of Systems   Musculoskeletal: Positive for myalgias.   Neurological: Positive for numbness.   All other systems reviewed and are negative.      Objective:    /88   Pulse 79   Ht 182.9 cm (72\")   Wt 86.2 kg (190 lb)   SpO2 98%   BMI 25.77 kg/m²     Neurology Exam:    General apperance: NAD.     Mental status: Alert, awake and oriented to time place and person.    Recent and Remote memory: Intact.    Attention span and Concentration: Normal.     Language and Speech: Intact- No dysarthria.    Fluency, Naming , " Repitition and Comprehension:  Intact    Cranial Nerves:   CN II: Visual fields are full on the right.  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 Fundi - Normal, No papillederma, Pupils - CRUZ  CN III, IV and VI: Extraocular movements are intact. Normal saccades.   CN V: Facial sensation is intact.   CN VII: Muscles of facial expression reveal no asymmetry. Intact.   CN VIII: Hearing is intact. Whispered voice intact.   CN IX and X: Palate elevates symmetrically. Intact  CN XI: Shoulder shrug is intact.   CN XII: Tongue is midline without evidence of atrophy or fasciculation.     Ophthalmoscopic exam of optic disc-normal    Motor:-Constant to and fro movements of the left thumb    Right UE muscle strength 5/5. Normal tone.     Left UE muscle strength 5/5.  Weak handgrip.  Clasp knife rigidity present     Right LE muscle strength5/5. Normal tone.     Left LE muscle strength 5/5. Normal tone.      Sensory: Normal light touch, vibration and pinprick sensation bilaterally.    DTRs: 2+ bilaterally in upper and lower extremities.    Babinski: Negative bilaterally.    Co-ordination: Normal finger-to-nose, heel to shin B/L.    Rhomberg: Negative.    Gait: Normal.    Cardiovascular: Regular rate and rhythm without murmur, gallop or rub.    Assessment and Plan:  1. Sequelae, post-stroke  Patient had a right occipital stroke in 2017 most likely due to small vessel disease.  Also had a PFO that was subsequently closed.  In 2018 he also had a TIA.  I have asked him to continue Plavix and Lipitor 20 mg daily.  We will check a lipid panel today even though he ate breakfast as well as an A1c  Maintain blood pressure less than 130/90  We will also start him on baclofen for his left arm gradually increasing to 10 mg twice a day  We will give him a physical therapy referral     - Lipid Panel; Future  - Hemoglobin A1c; Future  - Ambulatory Referral to Physical Therapy       Return in about 3 months (around 5/11/2020).      I spent over 40 minutes with the patient face to face out of which over 50% (30 minutes) was spent in management, instructions and education secondary stroke risk factors.     Lexie Broderick MD

## 2020-02-12 ENCOUNTER — TELEPHONE (OUTPATIENT)
Dept: NEUROLOGY | Facility: CLINIC | Age: 50
End: 2020-02-12

## 2020-02-12 NOTE — TELEPHONE ENCOUNTER
----- Message from Lexie Broderick MD sent at 2/12/2020  9:14 AM EST -----  Tell him that his cholesterol panel is normal.  He should continue taking the same dose of Lipitor 20 mg.  His A1c level is actually below normal and he should discuss this with his primary care doctor.  Because if he is on medications for diabetes then he should reduce the dose

## 2020-06-30 ENCOUNTER — OFFICE VISIT (OUTPATIENT)
Dept: NEUROLOGY | Facility: CLINIC | Age: 50
End: 2020-06-30

## 2020-06-30 VITALS
DIASTOLIC BLOOD PRESSURE: 82 MMHG | HEIGHT: 72 IN | SYSTOLIC BLOOD PRESSURE: 128 MMHG | TEMPERATURE: 98 F | WEIGHT: 178.8 LBS | HEART RATE: 76 BPM | BODY MASS INDEX: 24.22 KG/M2 | OXYGEN SATURATION: 98 %

## 2020-06-30 DIAGNOSIS — G44.209 TENSION HEADACHE: ICD-10-CM

## 2020-06-30 DIAGNOSIS — I69.30 SEQUELAE, POST-STROKE: Primary | ICD-10-CM

## 2020-06-30 PROCEDURE — 99214 OFFICE O/P EST MOD 30 MIN: CPT | Performed by: PSYCHIATRY & NEUROLOGY

## 2020-06-30 RX ORDER — OXYCODONE AND ACETAMINOPHEN 10; 325 MG/1; MG/1
TABLET ORAL
COMMUNITY
Start: 2020-05-29

## 2020-06-30 RX ORDER — AMITRIPTYLINE HYDROCHLORIDE 10 MG/1
10 TABLET, FILM COATED ORAL NIGHTLY
COMMUNITY
End: 2020-09-29 | Stop reason: SDUPTHER

## 2020-06-30 NOTE — PROGRESS NOTES
Subjective:    CC: Claude Masterson is seen today for follow-up of stroke    HPI:  Current visit- patient states that since the last time he started taking baclofen 10 mg twice a day and it has helped with his left arm stiffness.  His weakness also seems to have improved without physical therapy which she could not get due to COVID.  Continues to take Plavix along with Lipitor 20 mg daily.  His last lipid panel was as follows-, , LDL 83, HDL 45.  A1c was 4.6.  His Percocet dose was also increased recently and he is in the process of getting a pain pump for his chronic low back pain.  Today he also complains of right-sided headaches that he has been getting at least a few times a week.  He denies having any associated symptoms of nausea, vomiting, photophobia or phonophobia.  He typically takes a Tylenol and lays down when he has the headache.  Has been taking amitriptyline only as needed for sleep at night.    Initial [id number] left-handed male previously seen by Dr. Aleman with a history of stroke in 2017, hypertension, anxiety, chronic low back pain status post lumbar fusion in 2008 and 2009 presents for a follow-up for his stroke.  As per patient he had a stroke in August 2017 where he had left-sided numbness and mild weakness as well as loss of peripheral field of vision in the left eye.  His blood pressure was extremely high at the time.  Was admitted to [facility] where he was found to have a PFO that was subsequently closed.  Has been taking Plavix and Lipitor 20 mg since then.  Then in October 2018 patient had another brief episode of right-sided weakness.  He had a CT head that was normal, CT angiogram of the head and neck that did not show any significant intra-or extracranial stenosis and a MRI brain that showed a chronic right occipital infarct but no acute abnormalities.  Patient states that even now he feels slightly weak on the right but he also attributes it to the fact that he  is left-handed and does not use his right hand frequently.  Even his left arm feels stiff and weak since the stroke and his thumb twitches all the time.  He was on muscle relaxants but does not take anything currently.  He has also been having trouble with fine motor skills but stopped getting PT a while back.  Patient was smoking but quit entirely in 2017 after the stroke.  Of note-I personally reviewed his MRI brain and Dr. Aleman's notes.    The following portions of the patient's history were reviewed today and updated as of 02/11/2020  : allergies, current medications, past family history, past medical history, past social history, past surgical history and problem list  These document will be scanned to patient's chart.      Current Outpatient Medications:   •  amitriptyline (ELAVIL) 10 MG tablet, Take 10 mg by mouth Every Night., Disp: , Rfl:   •  amLODIPine (NORVASC) 10 MG tablet, Take 10 mg by mouth Daily., Disp: , Rfl:   •  atorvastatin (LIPITOR) 20 MG tablet, Take 20 mg by mouth Daily., Disp: , Rfl:   •  baclofen (LIORESAL) 10 MG tablet, Take 1 tablet by mouth 2 (Two) Times a Day., Disp: 60 tablet, Rfl: 3  •  clopidogrel (PLAVIX) 75 MG tablet, Take 75 mg by mouth Daily., Disp: , Rfl:   •  lisinopril (PRINIVIL,ZESTRIL) 20 MG tablet, Take 20 mg by mouth Daily., Disp: , Rfl:   •  oxyCODONE-acetaminophen (PERCOCET)  MG per tablet, , Disp: , Rfl:    Past Medical History:   Diagnosis Date   • Anxiety    • Anxiety and depression    • Arthritis    • Cerebrovascular disease    • DDD (degenerative disc disease), cervical    • Depression    • Hyperlipidemia    • Hypertension    • Injury of back    • Stroke (CMS/[facility])       Past Surgical History:   Procedure Laterality Date   • BACK SURGERY  2000    L5-S1   • BACK SURGERY  2009    L5-S1 replace hardware      Family History   Problem Relation Age of Onset   • Hypertension Mother    • Hypertension Father       Social History     Socioeconomic History   • Marital  "status: Single     Spouse name: Not on file   • Number of children: Not on file   • Years of education: Not on file   • Highest education level: Not on file   Tobacco Use   • Smoking status: Never Smoker   • Smokeless tobacco: Never Used   Substance and Sexual Activity   • Alcohol use: No   • Drug use: No   • Sexual activity: Defer     Review of Systems   Constitutional: Positive for appetite change (Has not had a very big appetite, lost some weight).   Neurological: Positive for headache (HA's off and on, occur on the right side).   All other systems reviewed and are negative.      Objective:    /82   Pulse 76   Temp 98 °F (36.7 °C)   Ht 182.9 cm (72\")   Wt 81.1 kg (178 lb 12.8 oz)   SpO2 98%   BMI 24.25 kg/m²     Neurology Exam:    General apperance: NAD.     Mental status: Alert, awake and oriented to time place and person.    Recent and Remote memory: Intact.    Attention span and Concentration: Normal.     Language and Speech: Intact- No dysarthria.    Fluency, Naming , Repitition and Comprehension:  Intact    Cranial Nerves:   CN II: Visual fields are full on the right.  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 Fundi - Normal, No papillederma, Pupils - CRUZ  CN III, IV and VI: Extraocular movements are intact. Normal saccades.   CN V: Facial sensation is intact.   CN VII: Muscles of facial expression reveal no asymmetry. Intact.   CN VIII: Hearing is intact. Whispered voice intact.   CN IX and X: Palate elevates symmetrically. Intact  CN XI: Shoulder shrug is intact.   CN XII: Tongue is midline without evidence of atrophy or fasciculation.     Ophthalmoscopic exam of optic disc-normal    Motor:-Constant to and fro movements of the left thumb    Right UE muscle strength 5/5. Normal tone.     Left UE muscle strength 5/5.  Weak handgrip.  Clasp knife rigidity not present on examination today     Right LE muscle strength5/5. Normal tone.     Left LE muscle strength 5/5. Normal tone.  "     Sensory: Normal light touch, vibration and pinprick sensation bilaterally.    DTRs: 2+ bilaterally in upper and lower extremities.    Babinski: Negative bilaterally.    Co-ordination: Normal finger-to-nose, heel to shin B/L.    Rhomberg: Negative.    Gait: Normal.    Cardiovascular: Regular rate and rhythm without murmur, gallop or rub.    Assessment and Plan:  1. Sequelae, post-stroke  Patient had a right occipital stroke in 2017 most likely due to small vessel disease.  Also had a PFO that was subsequently closed.  In 2018 he also had a TIA.  I have asked him to continue Plavix and Lipitor 20 mg daily.  Goal LDL of less than 100.  His LDL was 83  Maintain blood pressure less than 130/90  Continue baclofen 10 mg twice a day for increased tone in left arm however I have asked him to take it with caution as the combination of a muscle relaxant and opiates can make him excessively sleepy    2.  Tension headache  I have told him to take amitriptyline 10 mg every night that he was prescribed by his PCP for sleep           Return in about 3 months (around 9/30/2020).         Lexie Broderick MD

## 2020-09-29 ENCOUNTER — OFFICE VISIT (OUTPATIENT)
Dept: NEUROLOGY | Facility: CLINIC | Age: 50
End: 2020-09-29

## 2020-09-29 VITALS
HEIGHT: 72 IN | BODY MASS INDEX: 25.41 KG/M2 | WEIGHT: 187.6 LBS | TEMPERATURE: 97.8 F | DIASTOLIC BLOOD PRESSURE: 92 MMHG | OXYGEN SATURATION: 98 % | SYSTOLIC BLOOD PRESSURE: 130 MMHG | HEART RATE: 59 BPM

## 2020-09-29 DIAGNOSIS — I69.30 SEQUELAE, POST-STROKE: Primary | ICD-10-CM

## 2020-09-29 DIAGNOSIS — G44.209 TENSION HEADACHE: ICD-10-CM

## 2020-09-29 PROCEDURE — 99214 OFFICE O/P EST MOD 30 MIN: CPT | Performed by: PSYCHIATRY & NEUROLOGY

## 2020-09-29 RX ORDER — AMITRIPTYLINE HYDROCHLORIDE 10 MG/1
TABLET, FILM COATED ORAL
Qty: 60 TABLET | Refills: 5 | Status: SHIPPED | OUTPATIENT
Start: 2020-09-29 | End: 2021-02-08

## 2020-09-29 RX ORDER — BACLOFEN 10 MG/1
10 TABLET ORAL 2 TIMES DAILY
Qty: 60 TABLET | Refills: 3 | Status: SHIPPED | OUTPATIENT
Start: 2020-09-29 | End: 2021-01-25

## 2020-09-29 NOTE — PROGRESS NOTES
Subjective:    CC: Claude Masterson is seen today for follow-up of stroke    HPI:  Current visit- patient states that the baclofen has helped a little with his left arm spasticity.  He takes 1, 10 mg tab in the morning and 1 in the afternoon as he takes his opiates at night.  He also got a pain pump for his low back pain however had to get it removed due to feeling extremely lethargic.  For his stroke he continues to be on Plavix 75 mg and Lipitor 20 mg daily.  Has not had any episodes of weakness but has occasional pain in his right shoulder and right elbow.  Denies any numbness in the hands.  His headaches are also well controlled after he started amitriptyline 10 mg at night although he takes a higher dose at times to help him sleep.      Last visit-patient states that since the last time he started taking baclofen 10 mg twice a day and it has helped with his left arm stiffness.  His weakness also seems to have improved without physical therapy which she could not get due to COVID.  Continues to take Plavix along with Lipitor 20 mg daily.  His last lipid panel was as follows-, , LDL 83, HDL 45.  A1c was 4.6.  His Percocet dose was also increased recently and he is in the process of getting a pain pump for his chronic low back pain.  Today he also complains of right-sided headaches that he has been getting at least a few times a week.  He denies having any associated symptoms of nausea, vomiting, photophobia or phonophobia.  He typically takes a Tylenol and lays down when he has the headache.  Has been taking amitriptyline only as needed for sleep at night.    Initial [id number] left-handed male previously seen by Dr. Aleman with a history of stroke in 2017, hypertension, anxiety, chronic low back pain status post lumbar fusion in 2008 and 2009 presents for a follow-up for his stroke.  As per patient he had a stroke in August 2017 where he had left-sided numbness and mild weakness as well as loss of  peripheral field of vision in the left eye.  His blood pressure was extremely high at the time.  Was admitted to [facility] where he was found to have a PFO that was subsequently closed.  Has been taking Plavix and Lipitor 20 mg since then.  Then in October 2018 patient had another brief episode of right-sided weakness.  He had a CT head that was normal, CT angiogram of the head and neck that did not show any significant intra-or extracranial stenosis and a MRI brain that showed a chronic right occipital infarct but no acute abnormalities.  Patient states that even now he feels slightly weak on the right but he also attributes it to the fact that he is left-handed and does not use his right hand frequently.  Even his left arm feels stiff and weak since the stroke and his thumb twitches all the time.  He was on muscle relaxants but does not take anything currently.  He has also been having trouble with fine motor skills but stopped getting PT a while back.  Patient was smoking but quit entirely in 2017 after the stroke.  Of note-I personally reviewed his MRI brain and Dr. Aleman's notes.    The following portions of the patient's history were reviewed today and updated as of 02/11/2020  : allergies, current medications, past family history, past medical history, past social history, past surgical history and problem list  These document will be scanned to patient's chart.      Current Outpatient Medications:   •  amitriptyline (ELAVIL) 10 MG tablet, Take 1 to 2 tablets at night, Disp: 60 tablet, Rfl: 5  •  amLODIPine (NORVASC) 10 MG tablet, Take 10 mg by mouth Daily., Disp: , Rfl:   •  atorvastatin (LIPITOR) 20 MG tablet, Take 20 mg by mouth Daily., Disp: , Rfl:   •  baclofen (LIORESAL) 10 MG tablet, Take 1 tablet by mouth 2 (Two) Times a Day., Disp: 60 tablet, Rfl: 3  •  clopidogrel (PLAVIX) 75 MG tablet, Take 75 mg by mouth Daily., Disp: , Rfl:   •  lisinopril (PRINIVIL,ZESTRIL) 20 MG tablet, Take 20 mg by  "mouth Daily., Disp: , Rfl:   •  oxyCODONE-acetaminophen (PERCOCET)  MG per tablet, , Disp: , Rfl:    Past Medical History:   Diagnosis Date   • Anxiety    • Anxiety and depression    • Arthritis    • Cerebrovascular disease    • DDD (degenerative disc disease), cervical    • Depression    • Hyperlipidemia    • Hypertension    • Injury of back    • Stroke (CMS/HCC)       Past Surgical History:   Procedure Laterality Date   • BACK SURGERY  2000    L5-S1   • BACK SURGERY  2009    L5-S1 replace hardware      Family History   Problem Relation Age of Onset   • Hypertension Mother    • Hypertension Father       Social History     Socioeconomic History   • Marital status: Single     Spouse name: Not on file   • Number of children: Not on file   • Years of education: Not on file   • Highest education level: Not on file   Tobacco Use   • Smoking status: Never Smoker   • Smokeless tobacco: Never Used   Substance and Sexual Activity   • Alcohol use: No   • Drug use: No   • Sexual activity: Defer     Review of Systems   Constitutional: Positive for appetite change (Has not had a very big appetite, lost some weight).   Neurological: Positive for headache (HA's off and on, occur on the right side).   All other systems reviewed and are negative.      Objective:    /92   Pulse 59   Temp 97.8 °F (36.6 °C) (Temporal)   Ht 182.9 cm (72.01\")   Wt 85.1 kg (187 lb 9.6 oz)   SpO2 98%   BMI 25.44 kg/m²     Neurology Exam:    General apperance: NAD.     Mental status: Alert, awake and oriented to time place and person.    Recent and Remote memory: Intact.    Attention span and Concentration: Normal.     Language and Speech: Intact- No dysarthria.    Fluency, Naming , Repitition and Comprehension:  Intact    Cranial Nerves:   CN II: Visual fields are full on the right.  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 Fundi - Normal, No papillederma, Pupils - CRUZ  CN III, IV and VI: Extraocular movements are " intact. Normal saccades.   CN V: Facial sensation is intact.   CN VII: Muscles of facial expression reveal no asymmetry. Intact.   CN VIII: Hearing is intact. Whispered voice intact.   CN IX and X: Palate elevates symmetrically. Intact  CN XI: Shoulder shrug is intact.   CN XII: Tongue is midline without evidence of atrophy or fasciculation.     Ophthalmoscopic exam of optic disc-normal    Motor:-    Right UE muscle strength 5/5. Normal tone.     Left UE muscle strength 5-/5.  Weak handgrip.  Clasp knife rigidity not present on examination today     Right LE muscle strength5/5. Normal tone.     Left LE muscle strength 5-/5. Normal tone.      Sensory: Normal light touch, vibration and pinprick sensation bilaterally.    DTRs: 2+ bilaterally in upper and lower extremities.    Babinski: Negative bilaterally.    Co-ordination: Normal finger-to-nose, heel to shin B/L.    Rhomberg: Negative.    Gait: Normal.    Cardiovascular: Regular rate and rhythm without murmur, gallop or rub.    Assessment and Plan:  1. Sequelae, post-stroke  Patient had a right occipital stroke in 2017 most likely due to small vessel disease.  Also had a PFO that was subsequently closed.  In 2018 he also had a TIA.  I have asked him to continue Plavix and Lipitor 20 mg daily.  Goal LDL of less than 100.  His LDL was 83  Maintain blood pressure less than 130/90  Continue baclofen 10 mg twice a day for increased tone in left arm however I have asked him to take it with caution as the combination of a muscle relaxant and opiates can make him excessively sleepy  We will give him a physical therapy referral for his mild left-sided weakness    2.  Tension headache  I have told him to take continue amitriptyline 10 mg every night however he can take a higher dose of 20 mg if needed           Return in about 6 months (around 3/29/2021).         Lexie Broderick MD

## 2020-10-27 ENCOUNTER — TELEPHONE (OUTPATIENT)
Dept: NEUROLOGY | Facility: CLINIC | Age: 50
End: 2020-10-27

## 2020-10-27 NOTE — TELEPHONE ENCOUNTER
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     Can someone please process this if possible and contact patient to advise?     [phone]

## 2020-10-28 ENCOUNTER — DOCUMENTATION (OUTPATIENT)
Dept: NEUROLOGY | Facility: CLINIC | Age: 50
End: 2020-10-28

## 2020-10-28 NOTE — TELEPHONE ENCOUNTER
Yes we can type up a letter stating-patient had a right occipital stroke in 2017 that has caused him to have left-sided weakness

## 2021-01-24 DIAGNOSIS — I69.30 SEQUELAE, POST-STROKE: Primary | ICD-10-CM

## 2021-01-25 RX ORDER — BACLOFEN 10 MG/1
TABLET ORAL
Qty: 60 TABLET | Refills: 3 | Status: SHIPPED | OUTPATIENT
Start: 2021-01-25 | End: 2021-05-24

## 2021-02-08 ENCOUNTER — OFFICE VISIT (OUTPATIENT)
Dept: NEUROLOGY | Facility: CLINIC | Age: 51
End: 2021-02-08

## 2021-02-08 VITALS
DIASTOLIC BLOOD PRESSURE: 88 MMHG | OXYGEN SATURATION: 98 % | WEIGHT: 192.3 LBS | SYSTOLIC BLOOD PRESSURE: 118 MMHG | BODY MASS INDEX: 26.05 KG/M2 | HEIGHT: 72 IN

## 2021-02-08 DIAGNOSIS — I69.30 SEQUELAE, POST-STROKE: Primary | ICD-10-CM

## 2021-02-08 DIAGNOSIS — G44.209 TENSION HEADACHE: ICD-10-CM

## 2021-02-08 PROCEDURE — 99213 OFFICE O/P EST LOW 20 MIN: CPT | Performed by: PSYCHIATRY & NEUROLOGY

## 2021-02-08 RX ORDER — NORTRIPTYLINE HYDROCHLORIDE 25 MG/1
25 CAPSULE ORAL NIGHTLY
Qty: 30 CAPSULE | Refills: 5 | Status: SHIPPED | OUTPATIENT
Start: 2021-02-08 | End: 2022-09-19 | Stop reason: SDUPTHER

## 2021-02-08 NOTE — PROGRESS NOTES
Subjective:    CC: Claude Masterson is seen today for follow-up of stroke    HPI:  Current visit-patient states he has not had any new symptoms since his last visit.  He just got PT for his shoulder pain but did not get PT for the left side weakness however he does feel stronger on that side.  Continues to take Plavix along with Lipitor 20 mg and baclofen 10 mg twice a day.  With regards to his headaches he states that they have recently started coming back but he does not want to increase the dose of amitriptyline 10 mg as he feels groggy in the mornings.    Last visit-patient states that the baclofen has helped a little with his left arm spasticity.  He takes 1, 10 mg tab in the morning and 1 in the afternoon as he takes his opiates at night.  He also got a pain pump for his low back pain however had to get it removed due to feeling extremely lethargic.  For his stroke he continues to be on Plavix 75 mg and Lipitor 20 mg daily.  Has not had any episodes of weakness but has occasional pain in his right shoulder and right elbow.  Denies any numbness in the hands.  His headaches are also well controlled after he started amitriptyline 10 mg at night although he takes a higher dose at times to help him sleep.      Last visit-patient states that since the last time he started taking baclofen 10 mg twice a day and it has helped with his left arm stiffness.  His weakness also seems to have improved without physical therapy which she could not get due to COVID.  Continues to take Plavix along with Lipitor 20 mg daily.  His last lipid panel was as follows-, , LDL 83, HDL 45.  A1c was 4.6.  His Percocet dose was also increased recently and he is in the process of getting a pain pump for his chronic low back pain.  Today he also complains of right-sided headaches that he has been getting at least a few times a week.  He denies having any associated symptoms of nausea, vomiting, photophobia or phonophobia.  He  typically takes a Tylenol and lays down when he has the headache.  Has been taking amitriptyline only as needed for sleep at night.    Initial [id number] left-handed male previously seen by Dr. Aleman with a history of stroke in 2017, hypertension, anxiety, chronic low back pain status post lumbar fusion in 2008 and 2009 presents for a follow-up for his stroke.  As per patient he had a stroke in August 2017 where he had left-sided numbness and mild weakness as well as loss of peripheral field of vision in the left eye.  His blood pressure was extremely high at the time.  Was admitted to [facility] where he was found to have a PFO that was subsequently closed.  Has been taking Plavix and Lipitor 20 mg since then.  Then in October 2018 patient had another brief episode of right-sided weakness.  He had a CT head that was normal, CT angiogram of the head and neck that did not show any significant intra-or extracranial stenosis and a MRI brain that showed a chronic right occipital infarct but no acute abnormalities.  Patient states that even now he feels slightly weak on the right but he also attributes it to the fact that he is left-handed and does not use his right hand frequently.  Even his left arm feels stiff and weak since the stroke and his thumb twitches all the time.  He was on muscle relaxants but does not take anything currently.  He has also been having trouble with fine motor skills but stopped getting PT a while back.  Patient was smoking but quit entirely in 2017 after the stroke.  Of note-I personally reviewed his MRI brain and Dr. Aleman's notes.    The following portions of the patient's history were reviewed today and updated as of 02/11/2020  : allergies, current medications, past family history, past medical history, past social history, past surgical history and problem list  These document will be scanned to patient's chart.      Current Outpatient Medications:   •  amLODIPine (NORVASC) 10  "MG tablet, Take 10 mg by mouth Daily., Disp: , Rfl:   •  atorvastatin (LIPITOR) 20 MG tablet, Take 20 mg by mouth Daily., Disp: , Rfl:   •  baclofen (LIORESAL) 10 MG tablet, TAKE 1 TABLET BY MOUTH TWICE DAILY, Disp: 60 tablet, Rfl: 3  •  clopidogrel (PLAVIX) 75 MG tablet, Take 75 mg by mouth Daily., Disp: , Rfl:   •  lisinopril (PRINIVIL,ZESTRIL) 20 MG tablet, Take 20 mg by mouth Daily., Disp: , Rfl:   •  oxyCODONE-acetaminophen (PERCOCET)  MG per tablet, , Disp: , Rfl:   •  nortriptyline (Pamelor) 25 MG capsule, Take 1 capsule by mouth Every Night., Disp: 30 capsule, Rfl: 5   Past Medical History:   Diagnosis Date   • Anxiety    • Anxiety and depression    • Arthritis    • Cerebrovascular disease    • DDD (degenerative disc disease), cervical    • Depression    • Hyperlipidemia    • Hypertension    • Injury of back    • Stroke (CMS/HCC)       Past Surgical History:   Procedure Laterality Date   • BACK SURGERY  2000    L5-S1   • BACK SURGERY  2009    L5-S1 replace hardware      Family History   Problem Relation Age of Onset   • Hypertension Mother    • Hypertension Father       Social History     Socioeconomic History   • Marital status: Single     Spouse name: Not on file   • Number of children: Not on file   • Years of education: Not on file   • Highest education level: Not on file   Tobacco Use   • Smoking status: Never Smoker   • Smokeless tobacco: Never Used   Substance and Sexual Activity   • Alcohol use: No   • Drug use: No   • Sexual activity: Defer     Review of Systems   Constitutional: Positive for appetite change (Has not had a very big appetite, lost some weight).   Neurological: Positive for headache (HA's off and on, occur on the right side).   All other systems reviewed and are negative.      Objective:    /88   Ht 182.9 cm (72\")   Wt 87.2 kg (192 lb 4.8 oz)   SpO2 98%   BMI 26.08 kg/m²     Neurology Exam:    General apperance: NAD.     Mental status: Alert, awake and oriented to time " place and person.    Recent and Remote memory: Intact.    Attention span and Concentration: Normal.     Language and Speech: Intact- No dysarthria.    Fluency, Naming , Repitition and Comprehension:  Intact    Cranial Nerves:   CN II: Visual fields are full on the right.  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 Fundi - Normal, No papillederma, Pupils - CRUZ  CN III, IV and VI: Extraocular movements are intact. Normal saccades.   CN V: Facial sensation is intact.   CN VII: Muscles of facial expression reveal no asymmetry. Intact.   CN VIII: Hearing is intact. Whispered voice intact.   CN IX and X: Palate elevates symmetrically. Intact  CN XI: Shoulder shrug is intact.   CN XII: Tongue is midline without evidence of atrophy or fasciculation.     Ophthalmoscopic exam of optic disc-normal    Motor:-    Right UE muscle strength 5/5. Normal tone.     Left UE muscle strength 5/5 with normal tone.     Right LE muscle strength5/5. Normal tone.     Left LE muscle strength 5/5. Normal tone.      Sensory: Normal light touch, vibration and pinprick sensation bilaterally.    DTRs: 2+ bilaterally in upper and lower extremities.    Babinski: Negative bilaterally.    Co-ordination: Normal finger-to-nose, heel to shin B/L.    Rhomberg: Negative.    Gait: Normal.    Cardiovascular: Regular rate and rhythm without murmur, gallop or rub.    Assessment and Plan:  1. Sequelae, post-stroke  Patient had a right occipital stroke in 2017 most likely due to small vessel disease.  Also had a PFO that was subsequently closed.  In 2018 he also had a TIA.  I have asked him to continue Plavix and Lipitor 20 mg daily.  Goal LDL of less than 100.  His LDL was 83  Maintain blood pressure less than 130/90  Continue baclofen 10 mg twice a day for increased tone in left arm     2.  Tension headache  I will switch him from amitriptyline to nortriptyline and increase his dose to 25 mg nightly  I have also told him to keep himself  extremely well-hydrated           Return in about 6 months (around 8/8/2021).         Lexie Broderick MD

## 2021-04-01 RX ORDER — AMITRIPTYLINE HYDROCHLORIDE 10 MG/1
TABLET, FILM COATED ORAL
Qty: 60 TABLET | Refills: 5 | OUTPATIENT
Start: 2021-04-01

## 2021-05-22 DIAGNOSIS — I69.30 SEQUELAE, POST-STROKE: ICD-10-CM

## 2021-05-24 RX ORDER — BACLOFEN 10 MG/1
TABLET ORAL
Qty: 60 TABLET | Refills: 3 | Status: SHIPPED | OUTPATIENT
Start: 2021-05-24 | End: 2021-09-14

## 2021-06-14 ENCOUNTER — TELEPHONE (OUTPATIENT)
Dept: GASTROENTEROLOGY | Facility: CLINIC | Age: 51
End: 2021-06-14

## 2021-06-14 NOTE — TELEPHONE ENCOUNTER
----- Message from Ashetin Hakola sent at 6/14/2021 12:45 PM EDT -----  Regarding: CARDIAC CLEARANCE [facility] PT  PT IS ON PLAVIX PRESCRIBED BY DR FELISA FLOREZ. THANK YOU

## 2021-06-22 DIAGNOSIS — Z12.11 SCREENING FOR COLON CANCER: Primary | ICD-10-CM

## 2021-07-08 ENCOUNTER — OUTSIDE FACILITY SERVICE (OUTPATIENT)
Dept: GASTROENTEROLOGY | Facility: CLINIC | Age: 51
End: 2021-07-08

## 2021-07-08 PROCEDURE — 45378 DIAGNOSTIC COLONOSCOPY: CPT | Performed by: INTERNAL MEDICINE

## 2021-08-17 ENCOUNTER — OFFICE VISIT (OUTPATIENT)
Dept: NEUROLOGY | Facility: CLINIC | Age: 51
End: 2021-08-17

## 2021-08-17 VITALS
OXYGEN SATURATION: 95 % | HEIGHT: 72 IN | DIASTOLIC BLOOD PRESSURE: 70 MMHG | SYSTOLIC BLOOD PRESSURE: 108 MMHG | HEART RATE: 77 BPM | WEIGHT: 186.4 LBS | BODY MASS INDEX: 25.25 KG/M2

## 2021-08-17 DIAGNOSIS — G44.209 TENSION HEADACHE: ICD-10-CM

## 2021-08-17 DIAGNOSIS — I69.30 SEQUELAE, POST-STROKE: Primary | ICD-10-CM

## 2021-08-17 PROCEDURE — 99213 OFFICE O/P EST LOW 20 MIN: CPT | Performed by: PSYCHIATRY & NEUROLOGY

## 2021-08-17 NOTE — PROGRESS NOTES
Subjective:    CC: Claude Masterson is seen today for follow-up of stroke    HPI:  Current visit-patient states his headaches have improved significantly since he switch from amitriptyline to nortriptyline and increased his dose to 25 mg nightly.  In fact he has not had any headaches in the past month.  With regards to his stroke he continues to take Plavix and Lipitor 20 mg as well as baclofen 10 mg twice daily.  Today he also complains of lightheadedness and dizziness on standing up and walking.  In fact 2 days ago he almost passed out.  His blood pressure always runs low.    Last visit- patient states he has not had any new symptoms since his last visit.  He just got PT for his shoulder pain but did not get PT for the left side weakness however he does feel stronger on that side.  Continues to take Plavix along with Lipitor 20 mg and baclofen 10 mg twice a day.  With regards to his headaches he states that they have recently started coming back but he does not want to increase the dose of amitriptyline 10 mg as he feels groggy in the mornings.    Last visit-patient states that the baclofen has helped a little with his left arm spasticity.  He takes 1, 10 mg tab in the morning and 1 in the afternoon as he takes his opiates at night.  He also got a pain pump for his low back pain however had to get it removed due to feeling extremely lethargic.  For his stroke he continues to be on Plavix 75 mg and Lipitor 20 mg daily.  Has not had any episodes of weakness but has occasional pain in his right shoulder and right elbow.  Denies any numbness in the hands.  His headaches are also well controlled after he started amitriptyline 10 mg at night although he takes a higher dose at times to help him sleep.      Last visit-patient states that since the last time he started taking baclofen 10 mg twice a day and it has helped with his left arm stiffness.  His weakness also seems to have improved without physical therapy which she  could not get due to COVID.  Continues to take Plavix along with Lipitor 20 mg daily.  His last lipid panel was as follows-, , LDL 83, HDL 45.  A1c was 4.6.  His Percocet dose was also increased recently and he is in the process of getting a pain pump for his chronic low back pain.  Today he also complains of right-sided headaches that he has been getting at least a few times a week.  He denies having any associated symptoms of nausea, vomiting, photophobia or phonophobia.  He typically takes a Tylenol and lays down when he has the headache.  Has been taking amitriptyline only as needed for sleep at night.    Initial [id number]old left-handed male previously seen by Dr. Aleman with a history of stroke in 2017, hypertension, anxiety, chronic low back pain status post lumbar fusion in 2008 and 2009 presents for a follow-up for his stroke.  As per patient he had a stroke in August 2017 where he had left-sided numbness and mild weakness as well as loss of peripheral field of vision in the left eye.  His blood pressure was extremely high at the time.  Was admitted to [facility] where he was found to have a PFO that was subsequently closed.  Has been taking Plavix and Lipitor 20 mg since then.  Then in October 2018 patient had another brief episode of right-sided weakness.  He had a CT head that was normal, CT angiogram of the head and neck that did not show any significant intra-or extracranial stenosis and a MRI brain that showed a chronic right occipital infarct but no acute abnormalities.  Patient states that even now he feels slightly weak on the right but he also attributes it to the fact that he is left-handed and does not use his right hand frequently.  Even his left arm feels stiff and weak since the stroke and his thumb twitches all the time.  He was on muscle relaxants but does not take anything currently.  He has also been having trouble with fine motor skills but stopped getting PT a  while back.  Patient was smoking but quit entirely in 2017 after the stroke.  Of note-I personally reviewed his MRI brain and Dr. Aleman's notes.    The following portions of the patient's history were reviewed today and updated as of 02/11/2020  : allergies, current medications, past family history, past medical history, past social history, past surgical history and problem list  These document will be scanned to patient's chart.      Current Outpatient Medications:   •  amLODIPine (NORVASC) 10 MG tablet, Take 10 mg by mouth Daily., Disp: , Rfl:   •  atorvastatin (LIPITOR) 20 MG tablet, Take 20 mg by mouth Daily., Disp: , Rfl:   •  baclofen (LIORESAL) 10 MG tablet, TAKE 1 TABLET BY MOUTH TWICE DAILY, Disp: 60 tablet, Rfl: 3  •  clopidogrel (PLAVIX) 75 MG tablet, Take 75 mg by mouth Daily., Disp: , Rfl:   •  lisinopril (PRINIVIL,ZESTRIL) 20 MG tablet, Take 20 mg by mouth Daily., Disp: , Rfl:   •  nortriptyline (Pamelor) 25 MG capsule, Take 1 capsule by mouth Every Night., Disp: 30 capsule, Rfl: 5  •  oxyCODONE-acetaminophen (PERCOCET)  MG per tablet, , Disp: , Rfl:    Past Medical History:   Diagnosis Date   • Anxiety    • Anxiety and depression    • Arthritis    • Cerebrovascular disease    • DDD (degenerative disc disease), cervical    • Depression    • Hyperlipidemia    • Hypertension    • Injury of back    • Stroke (CMS/[facility])       Past Surgical History:   Procedure Laterality Date   • BACK SURGERY  2000    L5-S1   • BACK SURGERY  2009    L5-S1 replace hardware      Family History   Problem Relation Age of Onset   • Hypertension Mother    • Hypertension Father       Social History     Socioeconomic History   • Marital status: Single     Spouse name: Not on file   • Number of children: Not on file   • Years of education: Not on file   • Highest education level: Not on file   Tobacco Use   • Smoking status: Never Smoker   • Smokeless tobacco: Never Used   Vaping Use   • Vaping Use: Never used   Substance and  "Sexual Activity   • Alcohol use: No   • Drug use: No   • Sexual activity: Defer     Review of Systems   Constitutional: Positive for appetite change (Has not had a very big appetite, lost some weight).   Neurological: Positive for headache (HA's off and on, occur on the right side).   All other systems reviewed and are negative.      Objective:    /70   Pulse 77   Ht 182.9 cm (72\")   Wt 84.6 kg (186 lb 6.4 oz)   SpO2 95%   BMI 25.28 kg/m²     Neurology Exam:    General apperance: NAD.     Mental status: Alert, awake and oriented to time place and person.    Recent and Remote memory: Intact.    Attention span and Concentration: Normal.     Language and Speech: Intact- No dysarthria.    Fluency, Naming , Repitition and Comprehension:  Intact    Cranial Nerves:   CN II: Visual fields are full on the right.  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 Fundi - Normal, No papillederma, Pupils - CRUZ  CN III, IV and VI: Extraocular movements are intact. Normal saccades.   CN V: Facial sensation is intact.   CN VII: Muscles of facial expression reveal no asymmetry. Intact.   CN VIII: Hearing is intact. Whispered voice intact.   CN IX and X: Palate elevates symmetrically. Intact  CN XI: Shoulder shrug is intact.   CN XII: Tongue is midline without evidence of atrophy or fasciculation.     Ophthalmoscopic exam of optic disc-normal    Motor:-    Right UE muscle strength 5/5. Normal tone.     Left UE muscle strength 5/5 with mildly increased tone.     Right LE muscle strength5/5. Normal tone.     Left LE muscle strength 5/5. Normal tone.      Sensory: Normal light touch, vibration and pinprick sensation bilaterally.    DTRs: 2+ bilaterally in upper and lower extremities.    Babinski: Negative bilaterally.    Co-ordination: Normal finger-to-nose, heel to shin B/L.    Rhomberg: Negative.    Gait: Normal.    Cardiovascular: Regular rate and rhythm without murmur, gallop or rub.    Assessment and Plan:  1. " Sequelae, post-stroke  Patient had a right occipital stroke in 2017 most likely due to small vessel disease.  Also had a PFO that was subsequently closed.  In 2018 he also had a TIA.  I have asked him to continue Plavix and Lipitor 20 mg daily.  Goal LDL of less than 100.  His LDL was 83  Maintain blood pressure less than 130/90  Continue baclofen 10 mg twice a day for increased tone in left arm     2.  Tension headache  He should continue nortriptyline 25 mg nightly    3.  Postural lightheadedness  Could be due to hypotension.  I have told him to cut back on his lisinopril to 10 mg daily  I have also counseled him to keep himself well hydrated.  He should cut back on his soda intake as he admits to drinking several cans a day             Return in about 9 months (around 5/17/2022).         Lexie Broderick MD

## 2021-09-14 DIAGNOSIS — I69.30 SEQUELAE, POST-STROKE: ICD-10-CM

## 2021-09-14 RX ORDER — BACLOFEN 10 MG/1
TABLET ORAL
Qty: 60 TABLET | Refills: 3 | Status: SHIPPED | OUTPATIENT
Start: 2021-09-14

## 2022-05-17 ENCOUNTER — TELEPHONE (OUTPATIENT)
Dept: NEUROLOGY | Facility: CLINIC | Age: 52
End: 2022-05-17

## 2022-09-19 ENCOUNTER — OFFICE VISIT (OUTPATIENT)
Dept: NEUROLOGY | Facility: CLINIC | Age: 52
End: 2022-09-19

## 2022-09-19 VITALS
SYSTOLIC BLOOD PRESSURE: 110 MMHG | BODY MASS INDEX: 26.04 KG/M2 | HEART RATE: 85 BPM | WEIGHT: 192 LBS | DIASTOLIC BLOOD PRESSURE: 70 MMHG | OXYGEN SATURATION: 98 %

## 2022-09-19 DIAGNOSIS — G44.209 TENSION HEADACHE: ICD-10-CM

## 2022-09-19 DIAGNOSIS — I69.30 SEQUELAE, POST-STROKE: Primary | ICD-10-CM

## 2022-09-19 PROCEDURE — 99214 OFFICE O/P EST MOD 30 MIN: CPT | Performed by: PSYCHIATRY & NEUROLOGY

## 2022-09-19 RX ORDER — GABAPENTIN 300 MG/1
300 CAPSULE ORAL 3 TIMES DAILY
COMMUNITY
Start: 2022-08-22

## 2022-09-19 RX ORDER — NORTRIPTYLINE HYDROCHLORIDE 10 MG/1
10 CAPSULE ORAL NIGHTLY
Qty: 30 CAPSULE | Refills: 5 | Status: SHIPPED | OUTPATIENT
Start: 2022-09-19 | End: 2023-03-20

## 2022-09-19 RX ORDER — NORTRIPTYLINE HYDROCHLORIDE 25 MG/1
25 CAPSULE ORAL NIGHTLY
Qty: 30 CAPSULE | Refills: 5 | Status: SHIPPED | OUTPATIENT
Start: 2022-09-19 | End: 2023-03-20

## 2022-09-19 NOTE — PROGRESS NOTES
Subjective:    CC: Claude Masterson is seen today for follow-up of stroke    HPI:  Current visit- patient denies any new strokelike symptoms.  He continues to be on Plavix and Lipitor 20 mg.  He stopped taking lisinopril as his blood pressure was running too low and his dizziness has subsided.  Does take Norvasc 10 mg.  He has again started to get headaches about twice a week mainly in the back of his head.  He tries to lay down but sometimes the headache does not go away.  Has been taking nortriptyline 25 mg nightly.      Last visit-patient states his headaches have improved significantly since he switch from amitriptyline to nortriptyline and increased his dose to 25 mg nightly.  In fact he has not had any headaches in the past month.  With regards to his stroke he continues to take Plavix and Lipitor 20 mg as well as baclofen 10 mg twice daily.  Today he also complains of lightheadedness and dizziness on standing up and walking.  In fact 2 days ago he almost passed out.  His blood pressure always runs low.    Last visit- patient states he has not had any new symptoms since his last visit.  He just got PT for his shoulder pain but did not get PT for the left side weakness however he does feel stronger on that side.  Continues to take Plavix along with Lipitor 20 mg and baclofen 10 mg twice a day.  With regards to his headaches he states that they have recently started coming back but he does not want to increase the dose of amitriptyline 10 mg as he feels groggy in the mornings.    Last visit-patient states that the baclofen has helped a little with his left arm spasticity.  He takes 1, 10 mg tab in the morning and 1 in the afternoon as he takes his opiates at night.  He also got a pain pump for his low back pain however had to get it removed due to feeling extremely lethargic.  For his stroke he continues to be on Plavix 75 mg and Lipitor 20 mg daily.  Has not had any episodes of weakness but has occasional pain in  his right shoulder and right elbow.  Denies any numbness in the hands.  His headaches are also well controlled after he started amitriptyline 10 mg at night although he takes a higher dose at times to help him sleep.      Last visit-patient states that since the last time he started taking baclofen 10 mg twice a day and it has helped with his left arm stiffness.  His weakness also seems to have improved without physical therapy which she could not get due to COVID.  Continues to take Plavix along with Lipitor 20 mg daily.  His last lipid panel was as follows-, , LDL 83, HDL 45.  A1c was 4.6.  His Percocet dose was also increased recently and he is in the process of getting a pain pump for his chronic low back pain.  Today he also complains of right-sided headaches that he has been getting at least a few times a week.  He denies having any associated symptoms of nausea, vomiting, photophobia or phonophobia.  He typically takes a Tylenol and lays down when he has the headache.  Has been taking amitriptyline only as needed for sleep at night.    Initial [id number] left-handed male previously seen by Dr. Aleman with a history of stroke in 2017, hypertension, anxiety, chronic low back pain status post lumbar fusion in 2008 and 2009 presents for a follow-up for his stroke.  As per patient he had a stroke in August 2017 where he had left-sided numbness and mild weakness as well as loss of peripheral field of vision in the left eye.  His blood pressure was extremely high at the time.  Was admitted to [facility] where he was found to have a PFO that was subsequently closed.  Has been taking Plavix and Lipitor 20 mg since then.  Then in October 2018 patient had another brief episode of right-sided weakness.  He had a CT head that was normal, CT angiogram of the head and neck that did not show any significant intra-or extracranial stenosis and a MRI brain that showed a chronic right occipital infarct  but no acute abnormalities.  Patient states that even now he feels slightly weak on the right but he also attributes it to the fact that he is left-handed and does not use his right hand frequently.  Even his left arm feels stiff and weak since the stroke and his thumb twitches all the time.  He was on muscle relaxants but does not take anything currently.  He has also been having trouble with fine motor skills but stopped getting PT a while back.  Patient was smoking but quit entirely in 2017 after the stroke.  Of note-I personally reviewed his MRI brain and Dr. Aleman's notes.    The following portions of the patient's history were reviewed today and updated as of 02/11/2020  : allergies, current medications, past family history, past medical history, past social history, past surgical history and problem list  These document will be scanned to patient's chart.      Current Outpatient Medications:   •  amLODIPine (NORVASC) 10 MG tablet, Take 10 mg by mouth Daily., Disp: , Rfl:   •  atorvastatin (LIPITOR) 20 MG tablet, Take 20 mg by mouth Daily., Disp: , Rfl:   •  baclofen (LIORESAL) 10 MG tablet, TAKE 1 TABLET BY MOUTH TWICE DAILY, Disp: 60 tablet, Rfl: 3  •  clopidogrel (PLAVIX) 75 MG tablet, Take 75 mg by mouth Daily., Disp: , Rfl:   •  Diclofenac Sodium (VOLTAREN) 1 % gel gel, APPLY 2 GRAMS TOPICALLY TO THE AFFECTED AREA 2 TO 3 TIMES DAILY AS NEEDED FOR PAIN, Disp: , Rfl:   •  gabapentin (NEURONTIN) 300 MG capsule, Take 300 mg by mouth 3 (Three) Times a Day., Disp: , Rfl:   •  nortriptyline (Pamelor) 25 MG capsule, Take 1 capsule by mouth Every Night., Disp: 30 capsule, Rfl: 5  •  oxyCODONE-acetaminophen (PERCOCET)  MG per tablet, , Disp: , Rfl:   •  nortriptyline (Pamelor) 10 MG capsule, Take 1 capsule by mouth Every Night. Take in addition to nortriptyline 25 mg nightly, Disp: 30 capsule, Rfl: 5   Past Medical History:   Diagnosis Date   • Anxiety    • Anxiety and depression    • Arthritis    •  Cerebrovascular disease    • DDD (degenerative disc disease), cervical    • Depression    • Hyperlipidemia    • Hypertension    • Injury of back    • Stroke (HCC)       Past Surgical History:   Procedure Laterality Date   • BACK SURGERY  2000    L5-S1   • BACK SURGERY  2009    L5-S1 replace hardware      Family History   Problem Relation Age of Onset   • Hypertension Mother    • Hypertension Father       Social History     Socioeconomic History   • Marital status: Single   Tobacco Use   • Smoking status: Never Smoker   • Smokeless tobacco: Never Used   Vaping Use   • Vaping Use: Never used   Substance and Sexual Activity   • Alcohol use: No   • Drug use: No   • Sexual activity: Defer     Review of Systems   Constitutional: Positive for appetite change (Has not had a very big appetite, lost some weight).   Neurological: Positive for headache (HA's off and on, occur on the right side).   All other systems reviewed and are negative.      Objective:    /70   Pulse 85   Wt 87.1 kg (192 lb)   SpO2 98%   BMI 26.04 kg/m²     Neurology Exam:    General apperance: NAD.  No tenderness to palpation of his occipital region    Mental status: Alert, awake and oriented to time place and person.    Recent and Remote memory: Intact.    Attention span and Concentration: Normal.     Language and Speech: Intact- No dysarthria.    Fluency, Naming , Repitition and Comprehension:  Intact    Cranial Nerves:   CN II: Visual fields are full on the right.  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 Fundi - Normal, No papillederma, Pupils - CRUZ  CN III, IV and VI: Extraocular movements are intact. Normal saccades.   CN V: Facial sensation is intact.   CN VII: Muscles of facial expression reveal no asymmetry. Intact.   CN VIII: Hearing is intact. Whispered voice intact.   CN IX and X: Palate elevates symmetrically. Intact  CN XI: Shoulder shrug is intact.   CN XII: Tongue is midline without evidence of atrophy or  fasciculation.     Ophthalmoscopic exam of optic disc-normal    Motor:-    Right UE muscle strength 5/5. Normal tone.     Left UE muscle strength 5/5 with mildly increased tone.     Right LE muscle strength5/5. Normal tone.     Left LE muscle strength 5/5. Normal tone.      Sensory: Normal light touch, vibration and pinprick sensation bilaterally.    DTRs: 2+ bilaterally in upper and lower extremities.    Babinski: Negative bilaterally.    Co-ordination: Normal finger-to-nose, heel to shin B/L.    Rhomberg: Negative.    Gait: Normal.    Cardiovascular: Regular rate and rhythm without murmur, gallop or rub.    Assessment and Plan:  1. Sequelae, post-stroke  Patient had a right occipital stroke in 2017 most likely due to small vessel disease.  Also had a PFO that was subsequently closed.  In 2018 he also had a TIA.  I have asked him to continue Plavix and Lipitor 20 mg daily.  Goal LDL of less than 100.  His LDL was 83.  I have told him to get repeat lipid panel and fax over the results to me  Maintain blood pressure less than 130/90  Continue baclofen 10 mg twice a day for increased tone in left arm     2.  Tension headache  I will increase his nortriptyline to 35 mg nightly    3.  Postural lightheadedness  Has subsided since he stopped taking lisinopril             Return in about 6 months (around 3/19/2023).         Lexie Broderick MD

## 2023-01-20 ENCOUNTER — APPOINTMENT (OUTPATIENT)
Dept: MRI IMAGING | Facility: HOSPITAL | Age: 53
End: 2023-01-20
Payer: COMMERCIAL

## 2023-01-20 ENCOUNTER — APPOINTMENT (OUTPATIENT)
Dept: GENERAL RADIOLOGY | Facility: HOSPITAL | Age: 53
End: 2023-01-20
Payer: COMMERCIAL

## 2023-01-20 ENCOUNTER — APPOINTMENT (OUTPATIENT)
Dept: CT IMAGING | Facility: HOSPITAL | Age: 53
End: 2023-01-20
Payer: COMMERCIAL

## 2023-01-20 ENCOUNTER — HOSPITAL ENCOUNTER (EMERGENCY)
Facility: HOSPITAL | Age: 53
Discharge: HOME | End: 2023-01-20
Attending: EMERGENCY MEDICINE | Admitting: EMERGENCY MEDICINE
Payer: COMMERCIAL

## 2023-01-20 VITALS
RESPIRATION RATE: 16 BRPM | BODY MASS INDEX: 26.41 KG/M2 | TEMPERATURE: 98.7 F | SYSTOLIC BLOOD PRESSURE: 132 MMHG | HEIGHT: 72 IN | HEART RATE: 59 BPM | DIASTOLIC BLOOD PRESSURE: 94 MMHG | OXYGEN SATURATION: 96 % | WEIGHT: 195 LBS

## 2023-01-20 DIAGNOSIS — R20.2 FACIAL PARESTHESIA: ICD-10-CM

## 2023-01-20 DIAGNOSIS — R20.2 PARESTHESIA OF RIGHT ARM: Primary | ICD-10-CM

## 2023-01-20 LAB
ALBUMIN SERPL-MCNC: 4.5 G/DL (ref 3.5–5.2)
ALBUMIN/GLOB SERPL: 1.7 G/DL
ALP SERPL-CCNC: 117 U/L (ref 39–117)
ALT SERPL W P-5'-P-CCNC: 13 U/L (ref 1–41)
ANION GAP SERPL CALCULATED.3IONS-SCNC: 10 MMOL/L (ref 5–15)
APTT PPP: 32.3 SECONDS (ref 22–39)
AST SERPL-CCNC: 23 U/L (ref 1–40)
BASOPHILS # BLD AUTO: 0.04 10*3/MM3 (ref 0–0.2)
BASOPHILS NFR BLD AUTO: 0.6 % (ref 0–1.5)
BILIRUB SERPL-MCNC: 0.9 MG/DL (ref 0–1.2)
BUN SERPL-MCNC: 8 MG/DL (ref 6–20)
BUN/CREAT SERPL: 8.3 (ref 7–25)
CALCIUM SPEC-SCNC: 8.8 MG/DL (ref 8.6–10.5)
CHLORIDE SERPL-SCNC: 105 MMOL/L (ref 98–107)
CO2 SERPL-SCNC: 25 MMOL/L (ref 22–29)
CREAT SERPL-MCNC: 0.96 MG/DL (ref 0.76–1.27)
DEPRECATED RDW RBC AUTO: 39 FL (ref 37–54)
EGFRCR SERPLBLD CKD-EPI 2021: 94.5 ML/MIN/1.73
EOSINOPHIL # BLD AUTO: 0.13 10*3/MM3 (ref 0–0.4)
EOSINOPHIL NFR BLD AUTO: 1.8 % (ref 0.3–6.2)
ERYTHROCYTE [DISTWIDTH] IN BLOOD BY AUTOMATED COUNT: 12.1 % (ref 12.3–15.4)
GLOBULIN UR ELPH-MCNC: 2.6 GM/DL
GLUCOSE SERPL-MCNC: 92 MG/DL (ref 65–99)
HCT VFR BLD AUTO: 41.6 % (ref 37.5–51)
HGB BLD-MCNC: 14.2 G/DL (ref 13–17.7)
HOLD SPECIMEN: NORMAL
HOLD SPECIMEN: NORMAL
IMM GRANULOCYTES # BLD AUTO: 0.01 10*3/MM3 (ref 0–0.05)
IMM GRANULOCYTES NFR BLD AUTO: 0.1 % (ref 0–0.5)
INR PPP: 1 (ref 0.84–1.13)
LYMPHOCYTES # BLD AUTO: 2.25 10*3/MM3 (ref 0.7–3.1)
LYMPHOCYTES NFR BLD AUTO: 31.3 % (ref 19.6–45.3)
MAGNESIUM SERPL-MCNC: 2.1 MG/DL (ref 1.6–2.6)
MCH RBC QN AUTO: 30.4 PG (ref 26.6–33)
MCHC RBC AUTO-ENTMCNC: 34.1 G/DL (ref 31.5–35.7)
MCV RBC AUTO: 89.1 FL (ref 79–97)
MONOCYTES # BLD AUTO: 0.63 10*3/MM3 (ref 0.1–0.9)
MONOCYTES NFR BLD AUTO: 8.8 % (ref 5–12)
NEUTROPHILS NFR BLD AUTO: 4.12 10*3/MM3 (ref 1.7–7)
NEUTROPHILS NFR BLD AUTO: 57.4 % (ref 42.7–76)
NRBC BLD AUTO-RTO: 0 /100 WBC (ref 0–0.2)
PLATELET # BLD AUTO: 249 10*3/MM3 (ref 140–450)
PMV BLD AUTO: 10.5 FL (ref 6–12)
POTASSIUM SERPL-SCNC: 4.4 MMOL/L (ref 3.5–5.2)
PROT SERPL-MCNC: 7.1 G/DL (ref 6–8.5)
PROTHROMBIN TIME: 13.1 SECONDS (ref 11.4–14.4)
RBC # BLD AUTO: 4.67 10*6/MM3 (ref 4.14–5.8)
SODIUM SERPL-SCNC: 140 MMOL/L (ref 136–145)
TROPONIN T SERPL-MCNC: <0.01 NG/ML (ref 0–0.03)
WBC NRBC COR # BLD: 7.18 10*3/MM3 (ref 3.4–10.8)
WHOLE BLOOD HOLD COAG: NORMAL
WHOLE BLOOD HOLD SPECIMEN: NORMAL

## 2023-01-20 PROCEDURE — A9577 INJ MULTIHANCE: HCPCS | Performed by: EMERGENCY MEDICINE

## 2023-01-20 PROCEDURE — 0 GADOBENATE DIMEGLUMINE 529 MG/ML SOLUTION: Performed by: EMERGENCY MEDICINE

## 2023-01-20 PROCEDURE — 70450 CT HEAD/BRAIN W/O DYE: CPT

## 2023-01-20 PROCEDURE — 99285 EMERGENCY DEPT VISIT HI MDM: CPT

## 2023-01-20 PROCEDURE — 25010000002 LORAZEPAM PER 2 MG: Performed by: EMERGENCY MEDICINE

## 2023-01-20 PROCEDURE — 85610 PROTHROMBIN TIME: CPT | Performed by: EMERGENCY MEDICINE

## 2023-01-20 PROCEDURE — 70548 MR ANGIOGRAPHY NECK W/DYE: CPT

## 2023-01-20 PROCEDURE — 83735 ASSAY OF MAGNESIUM: CPT | Performed by: EMERGENCY MEDICINE

## 2023-01-20 PROCEDURE — 84484 ASSAY OF TROPONIN QUANT: CPT | Performed by: EMERGENCY MEDICINE

## 2023-01-20 PROCEDURE — 99284 EMERGENCY DEPT VISIT MOD MDM: CPT

## 2023-01-20 PROCEDURE — 70544 MR ANGIOGRAPHY HEAD W/O DYE: CPT

## 2023-01-20 PROCEDURE — 96374 THER/PROPH/DIAG INJ IV PUSH: CPT

## 2023-01-20 PROCEDURE — 85025 COMPLETE CBC W/AUTO DIFF WBC: CPT | Performed by: EMERGENCY MEDICINE

## 2023-01-20 PROCEDURE — 70551 MRI BRAIN STEM W/O DYE: CPT

## 2023-01-20 PROCEDURE — 99283 EMERGENCY DEPT VISIT LOW MDM: CPT | Performed by: CLINICAL NURSE SPECIALIST

## 2023-01-20 PROCEDURE — 71045 X-RAY EXAM CHEST 1 VIEW: CPT

## 2023-01-20 PROCEDURE — 93005 ELECTROCARDIOGRAM TRACING: CPT | Performed by: EMERGENCY MEDICINE

## 2023-01-20 PROCEDURE — 36415 COLL VENOUS BLD VENIPUNCTURE: CPT

## 2023-01-20 PROCEDURE — 85730 THROMBOPLASTIN TIME PARTIAL: CPT | Performed by: EMERGENCY MEDICINE

## 2023-01-20 PROCEDURE — 80053 COMPREHEN METABOLIC PANEL: CPT | Performed by: EMERGENCY MEDICINE

## 2023-01-20 RX ORDER — LORAZEPAM 2 MG/ML
1 INJECTION INTRAMUSCULAR ONCE
Status: COMPLETED | OUTPATIENT
Start: 2023-01-20 | End: 2023-01-20

## 2023-01-20 RX ORDER — SODIUM CHLORIDE 0.9 % (FLUSH) 0.9 %
10 SYRINGE (ML) INJECTION AS NEEDED
Status: DISCONTINUED | OUTPATIENT
Start: 2023-01-20 | End: 2023-01-21 | Stop reason: HOSPADM

## 2023-01-20 RX ADMIN — GADOBENATE DIMEGLUMINE 19 ML: 529 INJECTION, SOLUTION INTRAVENOUS at 21:40

## 2023-01-20 RX ADMIN — LORAZEPAM 1 MG: 2 INJECTION INTRAMUSCULAR; INTRAVENOUS at 20:45

## 2023-01-21 LAB
HOLD SPECIMEN: NORMAL
QT INTERVAL: 416 MS
QTC INTERVAL: 397 MS

## 2023-01-21 NOTE — CONSULTS
"Stroke Consult Note    Patient Name: Claude Masterson   MRN: [id number]  Age: 52 y.o.  Sex: male  : 1970    Primary Care Physician: Katy Calderon PA  Referring Physician:  No ref. provider found        Handedness: left  Race:      Chief Complaint/Reason for Consultation: left face numbness, right arm tingling    HPI: 52-year-old left-handed black male with known hypertension, hyperlipidemia, migraine headaches, neuropathy, and previous right occipital infarct who presents with an acute onset of right upper extremity tingling and pain, and left cheek numbness.  Upon assessment patient does not have a decreased sensation to touch in either his left cheek or his right upper extremity, he tells me now that the numbness has moved to the left side of his lips.  He continues to endorse right upper extremity tingling and pain.  Patient does have left upper extremity dysmetria on finger-to-nose from his previous right occipital infarct.  He denies unilateral weakness, speech problems, facial droop, dizziness or nausea vomiting.  He does tell me he has a \"tension headache\" and takes nortriptyline at baseline for headaches.  He tells me he takes Plavix daily.  His blood pressure on arrival is 154/95.    Last Known Normal Date/Time: 1630 EST     Review of Systems   Neurological: Positive for numbness and headaches.   All other systems reviewed and are negative.     Past Medical History:   Diagnosis Date   • Anxiety    • Anxiety and depression    • Arthritis    • Cerebrovascular disease    • DDD (degenerative disc disease), cervical    • Depression    • Hyperlipidemia    • Hypertension    • Injury of back    • Stroke (HCC)      Past Surgical History:   Procedure Laterality Date   • BACK SURGERY      L5-S1   • BACK SURGERY      L5-S1 replace hardware     Family History   Problem Relation Age of Onset   • Hypertension Mother    • Hypertension Father      Social History     Socioeconomic History "   • Marital status: Single   Tobacco Use   • Smoking status: Never   • Smokeless tobacco: Never   Vaping Use   • Vaping Use: Never used   Substance and Sexual Activity   • Alcohol use: No   • Drug use: No   • Sexual activity: Defer     Allergies   Allergen Reactions   • Penicillins Hives   • Hydrocodone-Acetaminophen Rash     Prior to Admission medications    Medication Sig Start Date End Date Taking? Authorizing Provider   amLODIPine (NORVASC) 10 MG tablet Take 10 mg by mouth Daily.    Cale Rebolledo MD   atorvastatin (LIPITOR) 20 MG tablet Take 20 mg by mouth Daily.    Cale Rebolledo MD   baclofen (LIORESAL) 10 MG tablet TAKE 1 TABLET BY MOUTH TWICE DAILY 9/14/21   Lexie Broderick MD   clopidogrel (PLAVIX) 75 MG tablet Take 75 mg by mouth Daily.    Cale Rebolledo MD   Diclofenac Sodium (VOLTAREN) 1 % gel gel APPLY 2 GRAMS TOPICALLY TO THE AFFECTED AREA 2 TO 3 TIMES DAILY AS NEEDED FOR PAIN 8/9/22   Cale Rebolledo MD   gabapentin (NEURONTIN) 300 MG capsule Take 300 mg by mouth 3 (Three) Times a Day. 8/22/22   Cale Rebolledo MD   nortriptyline (Pamelor) 10 MG capsule Take 1 capsule by mouth Every Night. Take in addition to nortriptyline 25 mg nightly 9/19/22 9/19/23  Lexie Broderick MD   nortriptyline (Pamelor) 25 MG capsule Take 1 capsule by mouth Every Night. 9/19/22 9/19/23  Lexie Broderick MD   oxyCODONE-acetaminophen (PERCOCET)  MG per tablet  5/29/20   Cale Rebolledo MD       Temp:  [98.7 °F (37.1 °C)] 98.7 °F (37.1 °C)  Heart Rate:  [78] 78  Resp:  [14] 14  BP: (154)/(95) 154/95  Neurological Exam  Mental Status  Awake, alert and oriented to person, place and time.Alert. Speech is normal. Language is fluent with no aphasia. Fund of knowledge is appropriate for level of education.    Cranial Nerves  CN II: Visual fields full to confrontation.  CN III, IV, VI: Extraocular movements intact bilaterally. Pupils equal round and reactive to light bilaterally.  CN V:  Facial sensation is normal. He endorses decreased sensation around the corner of his left mouth.  CN VII: Full and symmetric facial movement.  CN VIII: Hearing appears intact.  CN IX, X: Palate elevates symmetrically  CN XI: Shoulder shrug strength is normal.  CN XII: Tongue midline without atrophy or fasciculations.    Motor   Strength is 5/5 throughout all four extremities.    Sensory  Light touch is normal in upper and lower extremities. Sensation: Again patient denies any decreased sensation upon light touch to the face arms or legs.     Coordination  Left: Finger-to-nose abnormality:  Left upper extremity dysmetria on finger-to-nose.    Gait    Not observed.      Physical Exam  Vitals reviewed.   Constitutional:       Appearance: Normal appearance.   HENT:      Head: Normocephalic and atraumatic.      Mouth/Throat:      Mouth: Mucous membranes are moist.   Eyes:      Extraocular Movements: Extraocular movements intact.      Pupils: Pupils are equal, round, and reactive to light.   Pulmonary:      Effort: Pulmonary effort is normal.   Skin:     General: Skin is warm and dry.   Neurological:      Mental Status: He is alert.      Sensory: Sensory deficit present.      Motor: Motor strength is normal.   Psychiatric:         Mood and Affect: Mood normal.         Speech: Speech normal.         Acute Stroke Data    IV Thrombolytic (TPA/Tenecteplase) Inclusion / Exclusion Criteria    Time: 19:39 EST  Person Administering Scale: REGLA [place]    Inclusion Criteria  [x]   18 years of age or greater   [x]   Onset of symptoms < 4.5 hours before beginning treatment (stroke onset = time patient was last seen well or without symptoms).   []   Diagnosis of acute ischemic stroke causing measurable disabling deficit (Complete Hemianopia, Any Aphasia, Visual or Sensory Extinction, Any weakness limiting sustained effort against gravity)   []   Any remaining deficit considered potentially disabling in view of patient and  practitioner   Exclusion criteria (Do not proceed with Alteplase if any are checked under exclusion criteria)  []   Onset unknown or GREATER than 4.5 hours   []   ICH on CT/MRI   []   CT demonstrates hypodensity representing acute or subacute infarct   []   Significant head trauma or prior stroke in the previous 3 months   []   Symptoms suggestive of subarachnoid hemorrhage   []   History of un-ruptured intracranial aneurysm GREATER than 10 mm   []   Recent intracranial or intraspinal surgery within the last 3 months   []   Arterial puncture at a non-compressible site in the previous 7 days   []   Active internal bleeding   []   Acute bleeding tendency   []   Platelet count LESS than 100,000 for known hematological diseases such as leukemia, thrombocytopenia or chronic cirrhosis   []   Current use of anticoagulant with INR GREATER than 1.7 or PT GREATER than 15 seconds, aPTT GREATER than 40 seconds   []   Heparin received within 48 hours, resulting in abnormally elevated aPTT GREATER than upper limit of normal   []   Current use of direct thrombin inhibitors or direct factor Xa inhibitors in the past 48 hours   []   Elevated blood pressure refractory to treatment (systolic GREATER than 185 mm/Hg or diastolic  GREATER than 110 mm/Hg   []   Suspected infective endocarditis and aortic arch dissection   []   Current use of therapeutic treatment dose of low-molecular-weight heparin (LMWH) within the previous 24 hours   []   Structural GI malignancy or bleed   Relative exclusion for all patients  [x]   Only minor non-disabling symptoms   []   Pregnancy   []   Seizure at onset with postictal residual neurological impairments   []   Major surgery or previous trauma within past 14 days   []   History of previous spontaneous ICH, intracranial neoplasm, or AV malformation   []   Postpartum (within previous 14 days)   []   Recent GI or urinary tract hemorrhage (within previous 21 days)   []   Recent acute MI (within previous 3  months)   []   History of un-ruptured intracranial aneurysm LESS than 10 mm   []   History of ruptured intracranial aneurysm   []   Blood glucose LESS than 50 mg/dL (2.7 mmol/L)   []   Dural puncture within the last 7 days   []   Known GREATER than 10 cerebral microbleeds   Additional exclusions for patients with symptoms onset between 3 and 4.5 hours.  []   Age > 80.   []   On any anticoagulants regardless of INR  >>> Warfarin (Coumadin), Heparin, Enoxaparin (Lovenox), fondaparinux (Arixtra), bivalirudin (Angiomax), Argatroban, dabigatran (Pradaxa), rivaroxaban (Xarelto), or apixaban (Eliquis)   []   Severe stroke (NIHSS > 25).   []   History of BOTH diabetes and previous ischemic stroke.   []   The risks and benefits have been discussed with the patient or family related to the administration of IV Alteplase for stroke symptoms.   []   I have discussed and reviewed the patient's case and imaging with the attending prior to IV Thrombolytic (TPA/Tenecteplase).   n/a Time Thrombolytic administered       Hospital Meds:  Scheduled-    Infusions-     PRNs- •  sodium chloride    Functional Status Prior to Current Stroke/[place] Score: 1    NIH Stroke Scale  Time: 19:39 EST  Person Administering Scale: REGLA [place]    Interval: baseline  1a. Level of Consciousness: 0-->Alert, keenly responsive  1b. LOC Questions: 0-->Answers both questions correctly  1c. LOC Commands: 0-->Performs both tasks correctly  2. Best Gaze: 0-->Normal  3. Visual: 0-->No visual loss  4. Facial Palsy: 0-->Normal symmetrical movements  5a. Motor Arm, Left: 0-->No drift, limb holds 90 (or 45) degrees for full 10 secs  5b. Motor Arm, Right: 0-->No drift, limb holds 90 (or 45) degrees for full 10 secs  6a. Motor Leg, Left: 0-->No drift, leg holds 30 degree position for full 5 secs  6b. Motor Leg, Right: 0-->No drift, leg holds 30 degree position for full 5 secs  7. Limb Ataxia: 1-->Present in one limb  8. Sensory: 0-->Normal, no sensory  loss  9. Best Language: 0-->No aphasia, normal  10. Dysarthria: 0-->Normal  11. Extinction and Inattention (formerly Neglect): 0-->No abnormality    Total (NIH Stroke Scale): 1    Results Reviewed:  I have personally reviewed current lab, radiology, and data and agree with results.  CT head did not show any acute abnormality  MRI brain and MRA head and neck do not show any evidence of acute infarct or significant stenosis      Assessment/Plan:      52-year-old left-handed black male with hypertension, hyperlipidemia, migraines, and previous right occipital infarct who presents with right upper extremity tingling and pain and left cheek numbness that moved to the left corner of his mouth.  Patient did arrive within an hour of symptom onset, however as symptoms are not disabling and resolving on their own patient was not a candidate for IV thrombolytics    Patient had CT head and MRI brain as well as MRA head and neck that did not show any evidence of acute infarct or significant stenosis.  Suggest treating patient's complaint of headache.  No further inpatient stroke work-up is needed.  If numbness persist and patient needs to be admitted to the hospital, general neurology can follow-up with patient in the a.m.  Suggest patient follow-up with his outpatient neurologist, Dr. Broderick.           Wandy Weinberg DNP, APRN, AGCNS-BC  January 20, 2023  19:39 EST

## 2023-01-21 NOTE — ED PROVIDER NOTES
Subjective   History of Present Illness    Pt presents with numbness.  About one hour ago had onset of numbness to the left side of the face around his mouth as well as the right arm. No weakness or speech change.  The numbness is improving but still present.  History of stroke in 2017 with left sided weakness that he says is unchanged.      PMH HTN, CVA, chronic back pain        Review of Systems   Eyes: Negative for visual disturbance.   Gastrointestinal: Negative for vomiting.   Neurological: Positive for numbness. Negative for speech difficulty, weakness and headaches.   All other systems reviewed and are negative.      Past Medical History:   Diagnosis Date   • Anxiety    • Anxiety and depression    • Arthritis    • Cerebrovascular disease    • DDD (degenerative disc disease), cervical    • Depression    • Hyperlipidemia    • Hypertension    • Injury of back    • Stroke (HCC)        Allergies   Allergen Reactions   • Penicillins Hives   • Hydrocodone-Acetaminophen Rash       Past Surgical History:   Procedure Laterality Date   • BACK SURGERY  2000    L5-S1   • BACK SURGERY  2009    L5-S1 replace hardware       Family History   Problem Relation Age of Onset   • Hypertension Mother    • Hypertension Father        Social History     Socioeconomic History   • Marital status: Single   Tobacco Use   • Smoking status: Never   • Smokeless tobacco: Never   Vaping Use   • Vaping Use: Never used   Substance and Sexual Activity   • Alcohol use: No   • Drug use: No   • Sexual activity: Defer           Objective   Physical Exam  Vitals and nursing note reviewed.   Constitutional:       General: He is not in acute distress.     Appearance: Normal appearance. He is not ill-appearing.   HENT:      Head: Normocephalic and atraumatic.   Eyes:      General: No scleral icterus.        Right eye: No discharge.         Left eye: No discharge.      Conjunctiva/sclera: Conjunctivae normal.   Cardiovascular:      Rate and Rhythm: Normal  rate.   Pulmonary:      Effort: Pulmonary effort is normal. No respiratory distress.   Musculoskeletal:         General: No swelling or deformity.      Cervical back: Normal range of motion and neck supple.   Skin:     General: Skin is dry.      Findings: No rash.   Neurological:      Mental Status: He is alert and oriented to person, place, and time.      Comments: Mild weakness and dysmetria left arm he says is normal.  Otherwise normal exam, normal speech, no facial droop.   Psychiatric:         Mood and Affect: Mood normal.         Behavior: Behavior normal.         Thought Content: Thought content normal.         Procedures           ED Course         Seen on arrival, in CT scanner, with stroke team.  Symptoms are odd and not fully consistent with stroke.  Discussed with stroke NP and we will proceed with stat CT and then MRI for evaluation.       Labs benign.  EKG SB.  MRI/MRA show old stroke, no acute process. On rechecks pt says sx are resolving.  Admission considered but he is feeling better and is comfortable going home.    Patient stable on serial rechecks.  Discussed findings, concerns, plan of care, expected course, reasons to return and followup.  Provided the opportunity to ask questions.                               Medical Decision Making  Ddx stroke, demyelination/MS, electrolyte disturbance, anxiety    Facial paresthesia: undiagnosed new problem with uncertain prognosis  Paresthesia of right arm: undiagnosed new problem with uncertain prognosis  Amount and/or Complexity of Data Reviewed  Labs: ordered. Decision-making details documented in ED Course.  Radiology: ordered. Decision-making details documented in ED Course.  ECG/medicine tests: ordered. Decision-making details documented in ED Course.      Risk  Decision regarding hospitalization.          Final diagnoses:   Paresthesia of right arm   Facial paresthesia       ED Disposition  ED Disposition     ED Disposition   Discharge    Condition    Stable    Comment   --             Katy Calderon PA  [address] [place] [address]  [phone]    Call on 1/23/2023      [facility] Emergency Department  [address] [facility] [address] [phone]    If symptoms worsen         Medication List      No changes were made to your prescriptions during this visit.          Magdi Treviño MD  01/20/23 9847

## 2023-03-20 ENCOUNTER — LAB (OUTPATIENT)
Dept: LAB | Facility: HOSPITAL | Age: 53
End: 2023-03-20
Payer: COMMERCIAL

## 2023-03-20 ENCOUNTER — OFFICE VISIT (OUTPATIENT)
Dept: NEUROLOGY | Facility: CLINIC | Age: 53
End: 2023-03-20
Payer: COMMERCIAL

## 2023-03-20 VITALS
WEIGHT: 200.8 LBS | BODY MASS INDEX: 27.23 KG/M2 | SYSTOLIC BLOOD PRESSURE: 122 MMHG | DIASTOLIC BLOOD PRESSURE: 64 MMHG | OXYGEN SATURATION: 97 % | HEART RATE: 75 BPM

## 2023-03-20 DIAGNOSIS — I69.30 SEQUELAE, POST-STROKE: Primary | ICD-10-CM

## 2023-03-20 DIAGNOSIS — I69.30 SEQUELAE, POST-STROKE: ICD-10-CM

## 2023-03-20 DIAGNOSIS — G44.209 TENSION HEADACHE: ICD-10-CM

## 2023-03-20 PROCEDURE — 36415 COLL VENOUS BLD VENIPUNCTURE: CPT

## 2023-03-20 PROCEDURE — 80061 LIPID PANEL: CPT

## 2023-03-20 PROCEDURE — 1160F RVW MEDS BY RX/DR IN RCRD: CPT | Performed by: PSYCHIATRY & NEUROLOGY

## 2023-03-20 PROCEDURE — 1159F MED LIST DOCD IN RCRD: CPT | Performed by: PSYCHIATRY & NEUROLOGY

## 2023-03-20 PROCEDURE — 99213 OFFICE O/P EST LOW 20 MIN: CPT | Performed by: PSYCHIATRY & NEUROLOGY

## 2023-03-20 RX ORDER — NORTRIPTYLINE HYDROCHLORIDE 10 MG/1
CAPSULE ORAL
Qty: 30 CAPSULE | Refills: 5 | Status: SHIPPED | OUTPATIENT
Start: 2023-03-20

## 2023-03-20 RX ORDER — NORTRIPTYLINE HYDROCHLORIDE 25 MG/1
25 CAPSULE ORAL NIGHTLY
Qty: 30 CAPSULE | Refills: 5 | Status: SHIPPED | OUTPATIENT
Start: 2023-03-20 | End: 2024-03-19

## 2023-03-20 RX ORDER — ATORVASTATIN CALCIUM 20 MG/1
20 TABLET, FILM COATED ORAL DAILY
Qty: 90 TABLET | Refills: 3 | Status: SHIPPED | OUTPATIENT
Start: 2023-03-20

## 2023-03-20 NOTE — PROGRESS NOTES
Subjective:    CC: Claude Masterson is seen today for follow-up of stroke    HPI:  Current visit- patient states that since he increased his nortriptyline to 35 mg nightly his headache frequency has improved significantly.  He is also sleeping better.  He only had 2 headaches in the past month.  He also denies any new strokelike symptoms.  Continues to take Plavix and Lipitor 20 mg daily.  Has not had his lipid panel checked in over 2 years.  Also denies having any dizziness.    Last visit-patient denies any new strokelike symptoms.  He continues to be on Plavix and Lipitor 20 mg.  He stopped taking lisinopril as his blood pressure was running too low and his dizziness has subsided.  Does take Norvasc 10 mg.  He has again started to get headaches about twice a week mainly in the back of his head.  He tries to lay down but sometimes the headache does not go away.  Has been taking nortriptyline 25 mg nightly.      Last visit-patient states his headaches have improved significantly since he switch from amitriptyline to nortriptyline and increased his dose to 25 mg nightly.  In fact he has not had any headaches in the past month.  With regards to his stroke he continues to take Plavix and Lipitor 20 mg as well as baclofen 10 mg twice daily.  Today he also complains of lightheadedness and dizziness on standing up and walking.  In fact 2 days ago he almost passed out.  His blood pressure always runs low.    Last visit- patient states he has not had any new symptoms since his last visit.  He just got PT for his shoulder pain but did not get PT for the left side weakness however he does feel stronger on that side.  Continues to take Plavix along with Lipitor 20 mg and baclofen 10 mg twice a day.  With regards to his headaches he states that they have recently started coming back but he does not want to increase the dose of amitriptyline 10 mg as he feels groggy in the mornings.    Last visit-patient states that the baclofen  has helped a little with his left arm spasticity.  He takes 1, 10 mg tab in the morning and 1 in the afternoon as he takes his opiates at night.  He also got a pain pump for his low back pain however had to get it removed due to feeling extremely lethargic.  For his stroke he continues to be on Plavix 75 mg and Lipitor 20 mg daily.  Has not had any episodes of weakness but has occasional pain in his right shoulder and right elbow.  Denies any numbness in the hands.  His headaches are also well controlled after he started amitriptyline 10 mg at night although he takes a higher dose at times to help him sleep.      Last visit-patient states that since the last time he started taking baclofen 10 mg twice a day and it has helped with his left arm stiffness.  His weakness also seems to have improved without physical therapy which she could not get due to COVID.  Continues to take Plavix along with Lipitor 20 mg daily.  His last lipid panel was as follows-, , LDL 83, HDL 45.  A1c was 4.6.  His Percocet dose was also increased recently and he is in the process of getting a pain pump for his chronic low back pain.  Today he also complains of right-sided headaches that he has been getting at least a few times a week.  He denies having any associated symptoms of nausea, vomiting, photophobia or phonophobia.  He typically takes a Tylenol and lays down when he has the headache.  Has been taking amitriptyline only as needed for sleep at night.    Initial [id number] left-handed male previously seen by Dr. Aleman with a history of stroke in 2017, hypertension, anxiety, chronic low back pain status post lumbar fusion in 2008 and 2009 presents for a follow-up for his stroke.  As per patient he had a stroke in August 2017 where he had left-sided numbness and mild weakness as well as loss of peripheral field of vision in the left eye.  His blood pressure was extremely high at the time.  Was admitted to [facility] where he was found to have a PFO that was subsequently closed.  Has been taking Plavix and Lipitor 20 mg since then.  Then in October 2018 patient had another brief episode of right-sided weakness.  He had a CT head that was normal, CT angiogram of the head and neck that did not show any significant intra-or extracranial stenosis and a MRI brain that showed a chronic right occipital infarct but no acute abnormalities.  Patient states that even now he feels slightly weak on the right but he also attributes it to the fact that he is left-handed and does not use his right hand frequently.  Even his left arm feels stiff and weak since the stroke and his thumb twitches all the time.  He was on muscle relaxants but does not take anything currently.  He has also been having trouble with fine motor skills but stopped getting PT a while back.  Patient was smoking but quit entirely in 2017 after the stroke.  Of note-I personally reviewed his MRI brain and Dr. Aleman's notes.    The following portions of the patient's history were reviewed today and updated as of 02/11/2020  : allergies, current medications, past family history, past medical history, past social history, past surgical history and problem list  These document will be scanned to patient's chart.      Current Outpatient Medications:   •  amLODIPine (NORVASC) 10 MG tablet, Take 1 tablet by mouth Daily., Disp: , Rfl:   •  atorvastatin (LIPITOR) 20 MG tablet, Take 1 tablet by mouth Daily., Disp: 90 tablet, Rfl: 3  •  baclofen (LIORESAL) 10 MG tablet, TAKE 1 TABLET BY MOUTH TWICE DAILY, Disp: 60 tablet, Rfl: 3  •  clopidogrel (PLAVIX) 75 MG tablet, Take 1 tablet by mouth Daily., Disp: , Rfl:   •  Diclofenac Sodium (VOLTAREN) 1 % gel gel, APPLY 2 GRAMS TOPICALLY TO THE AFFECTED AREA 2 TO 3 TIMES DAILY AS NEEDED FOR PAIN, Disp: , Rfl:   •  gabapentin (NEURONTIN) 300 MG capsule, Take 1 capsule by mouth 3 (Three) Times a Day., Disp: , Rfl:   •  nortriptyline  (PAMELOR) 10 MG capsule, TAKE 1 CAPSULE BY MOUTH EVERY NIGHT IN ADDITION TO NORTRIPTYLINE 25 MG EVERY NIGHT, Disp: 30 capsule, Rfl: 5  •  nortriptyline (PAMELOR) 25 MG capsule, TAKE 1 CAPSULE BY MOUTH EVERY NIGHT, Disp: 30 capsule, Rfl: 5  •  oxyCODONE-acetaminophen (PERCOCET)  MG per tablet, , Disp: , Rfl:    Past Medical History:   Diagnosis Date   • Anxiety    • Anxiety and depression    • Arthritis    • Cerebrovascular disease    • DDD (degenerative disc disease), cervical    • Depression    • Hyperlipidemia    • Hypertension    • Injury of back    • Peripheral neuropathy 8/27)2017    Stroke   • Stroke (HCC)       Past Surgical History:   Procedure Laterality Date   • BACK SURGERY  2000    L5-S1   • BACK SURGERY  2009    L5-S1 replace hardware      Family History   Problem Relation Age of Onset   • Hypertension Mother    • Hypertension Father       Social History     Socioeconomic History   • Marital status: Single   Tobacco Use   • Smoking status: Never   • Smokeless tobacco: Never   Vaping Use   • Vaping Use: Never used   Substance and Sexual Activity   • Alcohol use: No   • Drug use: No   • Sexual activity: Yes     Partners: Female     Birth control/protection: None     Review of Systems   Constitutional: Positive for appetite change (Has not had a very big appetite, lost some weight).   Neurological: Positive for headache (HA's off and on, occur on the right side).   All other systems reviewed and are negative.      Objective:    /64   Pulse 75   Wt 91.1 kg (200 lb 12.8 oz)   SpO2 97%   BMI 27.23 kg/m²     Neurology Exam:    General apperance: NAD.  No tenderness to palpation of his occipital region    Mental status: Alert, awake and oriented to time place and person.  Could tell me the month, year and his ZIP Code    Recent and Remote memory: Intact.    Attention span and Concentration: Normal.     Language and Speech: Intact- No dysarthria.    Fluency, Naming , Repitition and Comprehension:   Intact    Cranial Nerves:   CN II: Visual fields are full on the right.  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 Fundi - Normal, No papillederma, Pupils - CRUZ  CN III, IV and VI: Extraocular movements are intact. Normal saccades.   CN V: Facial sensation is intact.   CN VII: Muscles of facial expression reveal no asymmetry. Intact.   CN VIII: Hearing is intact. Whispered voice intact.   CN IX and X: Palate elevates symmetrically. Intact  CN XI: Shoulder shrug is intact.   CN XII: Tongue is midline without evidence of atrophy or fasciculation.     Ophthalmoscopic exam of optic disc-normal    Motor:-    Right UE muscle strength 5/5. Normal tone.     Left UE muscle strength 5/5 with mildly increased tone.     Right LE muscle strength5/5. Normal tone.     Left LE muscle strength 5/5. Normal tone.      Sensory: Normal light touch, vibration and pinprick sensation bilaterally.    DTRs: 2+ bilaterally in upper and lower extremities.    Babinski: Negative bilaterally.    Co-ordination: Normal finger-to-nose, heel to shin B/L.    Rhomberg: Negative.    Gait: Normal.    Cardiovascular: Regular rate and rhythm without murmur, gallop or rub.    Assessment and Plan:  1. Sequelae, post-stroke  Patient had a right occipital stroke in 2017 most likely due to small vessel disease.  Also had a PFO that was subsequently closed.  In 2018 he also had a TIA.  I have asked him to continue Plavix and Lipitor 20 mg daily.  Goal LDL of less than 100.   Maintain blood pressure less than 130/90  Continue baclofen 10 mg twice a day for increased tone in left arm   I will recheck a lipid panel today    2.  Tension headache  He should continue nortriptyline 35 mg nightly  Also takes gabapentin prescribed by his PCP           Return in about 1 year (around 3/20/2024).         Lexie Broderick MD

## 2023-03-20 NOTE — TELEPHONE ENCOUNTER
Rx Refill Note  Requested Prescriptions     Pending Prescriptions Disp Refills   • nortriptyline (PAMELOR) 10 MG capsule [Pharmacy Med Name: NORTRIPTYLINE 10MG CAPSULES] 30 capsule 5     Sig: TAKE 1 CAPSULE BY MOUTH EVERY NIGHT IN ADDITION TO NORTRIPTYLINE 25 MG EVERY NIGHT   • nortriptyline (PAMELOR) 25 MG capsule [Pharmacy Med Name: NORTRIPTYLINE 25MG CAPSULES] 30 capsule 5     Sig: TAKE 1 CAPSULE BY MOUTH EVERY NIGHT      Last filled:09/19/22 #30 x5  Last office visit with prescribing clinician: 9/19/2022      Next office visit with prescribing clinician: 3/20/2023     Linda Molina MA  03/20/23, 08:56 EDT

## 2023-03-21 LAB
CHOLEST SERPL-MCNC: 149 MG/DL (ref 0–200)
HDLC SERPL-MCNC: 46 MG/DL (ref 40–60)
LDLC SERPL CALC-MCNC: 88 MG/DL (ref 0–100)
LDLC/HDLC SERPL: 1.9 {RATIO}
TRIGL SERPL-MCNC: 78 MG/DL (ref 0–150)
VLDLC SERPL-MCNC: 15 MG/DL (ref 5–40)

## 2023-09-19 RX ORDER — NORTRIPTYLINE HYDROCHLORIDE 25 MG/1
25 CAPSULE ORAL NIGHTLY
Qty: 30 CAPSULE | Refills: 5 | Status: SHIPPED | OUTPATIENT
Start: 2023-09-19 | End: 2024-09-18

## 2023-09-19 RX ORDER — NORTRIPTYLINE HYDROCHLORIDE 10 MG/1
CAPSULE ORAL
Qty: 30 CAPSULE | Refills: 5 | Status: SHIPPED | OUTPATIENT
Start: 2023-09-19

## 2023-09-19 NOTE — TELEPHONE ENCOUNTER
Rx Refill Note  Requested Prescriptions     Pending Prescriptions Disp Refills    nortriptyline (PAMELOR) 25 MG capsule [Pharmacy Med Name: NORTRIPTYLINE 25MG CAPSULES] 30 capsule 5     Sig: TAKE 1 CAPSULE BY MOUTH EVERY NIGHT    nortriptyline (PAMELOR) 10 MG capsule [Pharmacy Med Name: NORTRIPTYLINE 10MG CAPSULES] 30 capsule 5     Sig: TAKE 1 CAPSULE BY MOUTH EVERY NIGHT IN ADDITION TO NORTRIPTYLINE 25 MG EVERY NIGHT      Last filled:03/202023  Last office visit with prescribing clinician: 3/20/2023      Next office visit with prescribing clinician: 3/20/2024     Linda Molina MA  09/19/23, 17:05 EDT    I sent the Rx to the pharmacy.

## 2024-03-07 ENCOUNTER — TELEPHONE (OUTPATIENT)
Dept: UROLOGY | Facility: CLINIC | Age: 54
End: 2024-03-07

## 2024-03-07 ENCOUNTER — OFFICE VISIT (OUTPATIENT)
Dept: UROLOGY | Facility: CLINIC | Age: 54
End: 2024-03-07
Payer: COMMERCIAL

## 2024-03-07 VITALS — WEIGHT: 190 LBS | HEIGHT: 72 IN | OXYGEN SATURATION: 96 % | BODY MASS INDEX: 25.73 KG/M2 | HEART RATE: 63 BPM

## 2024-03-07 DIAGNOSIS — N52.9 ERECTILE DYSFUNCTION, UNSPECIFIED ERECTILE DYSFUNCTION TYPE: Primary | ICD-10-CM

## 2024-03-07 DIAGNOSIS — R39.9 LOWER URINARY TRACT SYMPTOMS (LUTS): ICD-10-CM

## 2024-03-07 DIAGNOSIS — N52.9 ERECTILE DYSFUNCTION, UNSPECIFIED ERECTILE DYSFUNCTION TYPE: ICD-10-CM

## 2024-03-07 RX ORDER — TAMSULOSIN HYDROCHLORIDE 0.4 MG/1
1 CAPSULE ORAL DAILY
Qty: 30 CAPSULE | Refills: 1 | Status: SHIPPED | OUTPATIENT
Start: 2024-03-07

## 2024-03-07 RX ORDER — AMITRIPTYLINE HYDROCHLORIDE 10 MG/1
10 TABLET, FILM COATED ORAL DAILY
COMMUNITY

## 2024-03-07 RX ORDER — TADALAFIL 10 MG/1
10 TABLET ORAL AS NEEDED
Qty: 20 TABLET | Refills: 1 | Status: SHIPPED | OUTPATIENT
Start: 2024-03-07

## 2024-03-07 RX ORDER — TADALAFIL 10 MG/1
10 TABLET ORAL AS NEEDED
Qty: 20 TABLET | Refills: 1 | Status: SHIPPED | OUTPATIENT
Start: 2024-03-07 | End: 2024-03-07 | Stop reason: SDUPTHER

## 2024-03-07 NOTE — TELEPHONE ENCOUNTER
Patient called and tadalafil is too expensive and his pharmacy does not use good rx, he asked if you could send the script to the [facility] [address], I explained to him how to get the good rx card and gave him an estimate of what they charge with it based off of a sheet we have?
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
## 2024-03-11 ENCOUNTER — LAB (OUTPATIENT)
Dept: LAB | Facility: HOSPITAL | Age: 54
End: 2024-03-11
Payer: COMMERCIAL

## 2024-03-11 DIAGNOSIS — R39.9 LOWER URINARY TRACT SYMPTOMS (LUTS): ICD-10-CM

## 2024-03-11 DIAGNOSIS — N52.9 ERECTILE DYSFUNCTION, UNSPECIFIED ERECTILE DYSFUNCTION TYPE: ICD-10-CM

## 2024-03-11 LAB
PSA SERPL-MCNC: 0.53 NG/ML (ref 0–4)
TESTOST SERPL-MCNC: 651 NG/DL (ref 193–740)

## 2024-03-11 PROCEDURE — 84403 ASSAY OF TOTAL TESTOSTERONE: CPT

## 2024-03-11 PROCEDURE — 36415 COLL VENOUS BLD VENIPUNCTURE: CPT

## 2024-03-11 PROCEDURE — 84153 ASSAY OF PSA TOTAL: CPT

## 2024-03-12 ENCOUNTER — TELEPHONE (OUTPATIENT)
Dept: UROLOGY | Facility: CLINIC | Age: 54
End: 2024-03-12
Payer: COMMERCIAL

## 2024-03-12 NOTE — TELEPHONE ENCOUNTER
Pt seen in consultation on 3/7 for ED and LUTS.  Total T and PSA wnl.  Discussed with pt via Mychart:      Your bloodwork, testosterone and PSA, are normal.  No action needed for these.  We will see how your doing on the medications at your follow up next month.  If there are any questions or concerns, please let me know.

## 2024-03-20 ENCOUNTER — OFFICE VISIT (OUTPATIENT)
Dept: NEUROLOGY | Facility: CLINIC | Age: 54
End: 2024-03-20
Payer: COMMERCIAL

## 2024-03-20 VITALS
WEIGHT: 190 LBS | SYSTOLIC BLOOD PRESSURE: 132 MMHG | BODY MASS INDEX: 25.73 KG/M2 | HEART RATE: 65 BPM | DIASTOLIC BLOOD PRESSURE: 92 MMHG | OXYGEN SATURATION: 93 % | HEIGHT: 72 IN

## 2024-03-20 DIAGNOSIS — G44.209 TENSION HEADACHE: ICD-10-CM

## 2024-03-20 DIAGNOSIS — I69.30 SEQUELAE, POST-STROKE: Primary | ICD-10-CM

## 2024-03-20 PROCEDURE — 99214 OFFICE O/P EST MOD 30 MIN: CPT | Performed by: PSYCHIATRY & NEUROLOGY

## 2024-03-20 PROCEDURE — 1160F RVW MEDS BY RX/DR IN RCRD: CPT | Performed by: PSYCHIATRY & NEUROLOGY

## 2024-03-20 PROCEDURE — 1159F MED LIST DOCD IN RCRD: CPT | Performed by: PSYCHIATRY & NEUROLOGY

## 2024-03-20 RX ORDER — NORTRIPTYLINE HYDROCHLORIDE 25 MG/1
1 CAPSULE ORAL NIGHTLY
COMMUNITY
End: 2024-03-20 | Stop reason: SDUPTHER

## 2024-03-20 RX ORDER — NORTRIPTYLINE HYDROCHLORIDE 25 MG/1
50 CAPSULE ORAL NIGHTLY
Qty: 60 CAPSULE | Refills: 11 | Status: SHIPPED | OUTPATIENT
Start: 2024-03-20

## 2024-03-20 RX ORDER — GABAPENTIN 400 MG/1
CAPSULE ORAL
COMMUNITY

## 2024-03-20 RX ORDER — AMANTADINE HYDROCHLORIDE 100 MG/1
TABLET ORAL
COMMUNITY

## 2024-03-20 NOTE — PROGRESS NOTES
Subjective:    CC: Claude Masterson is seen today for follow-up of stroke    HPI:  Current visit-patient denies any new strokelike symptoms but continues to have some stiffness and weakness of his left arm despite taking gabapentin 400 mg nightly and tizanidine.  He continues to take Plavix and Lipitor 20 mg daily.  Last lipid panel was as follows-, TG 99, LDL 82, HDL 45.  He is also having about 4-5 headaches a month which are all over the head, dull in nature.  Also continues to have some sleep disturbances despite taking nortriptyline 25 mg in addition to amitriptyline 10 mg nightly that was started by a different provider.  However he does feel slightly groggy during the daytime with amitriptyline.    Last visit-patient states that since he increased his nortriptyline to 35 mg nightly his headache frequency has improved significantly.  He is also sleeping better.  He only had 2 headaches in the past month.  He also denies any new strokelike symptoms.  Continues to take Plavix and Lipitor 20 mg daily.  Has not had his lipid panel checked in over 2 years.  Also denies having any dizziness.    Last visit-patient denies any new strokelike symptoms.  He continues to be on Plavix and Lipitor 20 mg.  He stopped taking lisinopril as his blood pressure was running too low and his dizziness has subsided.  Does take Norvasc 10 mg.  He has again started to get headaches about twice a week mainly in the back of his head.  He tries to lay down but sometimes the headache does not go away.  Has been taking nortriptyline 25 mg nightly.      Last visit-patient states his headaches have improved significantly since he switch from amitriptyline to nortriptyline and increased his dose to 25 mg nightly.  In fact he has not had any headaches in the past month.  With regards to his stroke he continues to take Plavix and Lipitor 20 mg as well as baclofen 10 mg twice daily.  Today he also complains of lightheadedness and dizziness on  standing up and walking.  In fact 2 days ago he almost passed out.  His blood pressure always runs low.    Last visit- patient states he has not had any new symptoms since his last visit.  He just got PT for his shoulder pain but did not get PT for the left side weakness however he does feel stronger on that side.  Continues to take Plavix along with Lipitor 20 mg and baclofen 10 mg twice a day.  With regards to his headaches he states that they have recently started coming back but he does not want to increase the dose of amitriptyline 10 mg as he feels groggy in the mornings.    Last visit-patient states that the baclofen has helped a little with his left arm spasticity.  He takes 1, 10 mg tab in the morning and 1 in the afternoon as he takes his opiates at night.  He also got a pain pump for his low back pain however had to get it removed due to feeling extremely lethargic.  For his stroke he continues to be on Plavix 75 mg and Lipitor 20 mg daily.  Has not had any episodes of weakness but has occasional pain in his right shoulder and right elbow.  Denies any numbness in the hands.  His headaches are also well controlled after he started amitriptyline 10 mg at night although he takes a higher dose at times to help him sleep.      Last visit-patient states that since the last time he started taking baclofen 10 mg twice a day and it has helped with his left arm stiffness.  His weakness also seems to have improved without physical therapy which she could not get due to COVID.  Continues to take Plavix along with Lipitor 20 mg daily.  His last lipid panel was as follows-, , LDL 83, HDL 45.  A1c was 4.6.  His Percocet dose was also increased recently and he is in the process of getting a pain pump for his chronic low back pain.  Today he also complains of right-sided headaches that he has been getting at least a few times a week.  He denies having any associated symptoms of nausea, vomiting, photophobia or  phonophobia.  He typically takes a Tylenol and lays down when he has the headache.  Has been taking amitriptyline only as needed for sleep at night.    Initial [id number]-old left-handed male previously seen by Dr. Aleman with a history of stroke in 2017, hypertension, anxiety, chronic low back pain status post lumbar fusion in 2008 and 2009 presents for a follow-up for his stroke.  As per patient he had a stroke in August 2017 where he had left-sided numbness and mild weakness as well as loss of peripheral field of vision in the left eye.  His blood pressure was extremely high at the time.  Was admitted to [facility] where he was found to have a PFO that was subsequently closed.  Has been taking Plavix and Lipitor 20 mg since then.  Then in October 2018 patient had another brief episode of right-sided weakness.  He had a CT head that was normal, CT angiogram of the head and neck that did not show any significant intra-or extracranial stenosis and a MRI brain that showed a chronic right occipital infarct but no acute abnormalities.  Patient states that even now he feels slightly weak on the right but he also attributes it to the fact that he is left-handed and does not use his right hand frequently.  Even his left arm feels stiff and weak since the stroke and his thumb twitches all the time.  He was on muscle relaxants but does not take anything currently.  He has also been having trouble with fine motor skills but stopped getting PT a while back.  Patient was smoking but quit entirely in 2017 after the stroke.  Of note-I personally reviewed his MRI brain and Dr. Aleman's notes.    The following portions of the patient's history were reviewed today and updated as of 02/11/2020  : allergies, current medications, past family history, past medical history, past social history, past surgical history and problem list  These document will be scanned to patient's chart.      Current Outpatient Medications:      nortriptyline (PAMELOR) 25 MG capsule, Take 2 capsules by mouth Every Night., Disp: 60 capsule, Rfl: 11    amantadine (SYMMETREL) 100 MG tablet, , Disp: , Rfl:     amLODIPine (NORVASC) 10 MG tablet, Take 1 tablet by mouth Daily., Disp: , Rfl:     atorvastatin (LIPITOR) 20 MG tablet, Take 1 tablet by mouth Daily., Disp: 90 tablet, Rfl: 3    clopidogrel (PLAVIX) 75 MG tablet, Take 1 tablet by mouth Daily., Disp: , Rfl:     Diclofenac Sodium (VOLTAREN) 1 % gel gel, APPLY 2 GRAMS TOPICALLY TO THE AFFECTED AREA 2 TO 3 TIMES DAILY AS NEEDED FOR PAIN, Disp: , Rfl:     gabapentin (NEURONTIN) 400 MG capsule, Take 1 capsule 3 times a day by oral route., Disp: , Rfl:     oxyCODONE-acetaminophen (PERCOCET)  MG per tablet, , Disp: , Rfl:     tadalafil (Cialis) 10 MG tablet, Take 1 tablet by mouth As Needed for Erectile Dysfunction., Disp: 20 tablet, Rfl: 1    tamsulosin (FLOMAX) 0.4 MG capsule 24 hr capsule, Take 1 capsule by mouth Daily., Disp: 30 capsule, Rfl: 1   Past Medical History:   Diagnosis Date    Anxiety     Anxiety and depression     Arthritis     Cerebrovascular disease     DDD (degenerative disc disease), cervical     Depression     Erectile dysfunction     Hormone disorder     Hyperlipidemia     Hypertension     Injury of back     Peripheral neuropathy 8/27)2017    Stroke    Stroke       Past Surgical History:   Procedure Laterality Date    BACK SURGERY  2000    L5-S1    BACK SURGERY  2009    L5-S1 replace hardware    HERNIA REPAIR        Family History   Problem Relation Age of Onset    Hypertension Mother     Hypertension Father       Social History     Socioeconomic History    Marital status: Single   Tobacco Use    Smoking status: Never     Passive exposure: Never    Smokeless tobacco: Never   Vaping Use    Vaping status: Never Used   Substance and Sexual Activity    Alcohol use: No    Drug use: No    Sexual activity: Yes     Partners: Female     Birth control/protection: None     Review of Systems  "  Constitutional: Positive for appetite change (Has not had a very big appetite, lost some weight).   Neurological: Positive for headache (HA's off and on, occur on the right side).   All other systems reviewed and are negative.      Objective:    /92   Pulse 65   Ht 182.9 cm (72\")   Wt 86.2 kg (190 lb)   SpO2 93%   BMI 25.77 kg/m²     Neurology Exam:    General apperance: NAD.  No tenderness to palpation of his occipital region    Mental status: Alert, awake and oriented to time place and person.  Could tell me the month, year and his ZIP Code    Recent and Remote memory: Intact.    Attention span and Concentration: Normal.     Language and Speech: Intact- No dysarthria.    Fluency, Naming , Repitition and Comprehension:  Intact    Cranial Nerves:   CN II: Visual fields are full on the right.  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 Fundi - Normal, No papillederma, Pupils - CRUZ  CN III, IV and VI: Extraocular movements are intact. Normal saccades.   CN V: Facial sensation is intact.   CN VII: Muscles of facial expression reveal no asymmetry. Intact.   CN VIII: Hearing is intact. Whispered voice intact.   CN IX and X: Palate elevates symmetrically. Intact  CN XI: Shoulder shrug is intact.   CN XII: Tongue is midline without evidence of atrophy or fasciculation.     Ophthalmoscopic exam of optic disc-normal    Motor:-    Right UE muscle strength 5/5. Normal tone.     Left UE muscle strength 5/5 with mildly increased tone.     Right LE muscle strength5/5. Normal tone.     Left LE muscle strength 5/5. Normal tone.      Sensory: Normal light touch, vibration and pinprick sensation bilaterally.    DTRs: 2+ bilaterally in upper and lower extremities.    Babinski: Negative bilaterally.    Co-ordination: Normal finger-to-nose, heel to shin B/L.    Rhomberg: Negative.    Gait: Normal.    Cardiovascular: Regular rate and rhythm without murmur, gallop or rub.    Assessment and Plan:  1. Sequelae, " post-stroke  Patient had a right occipital stroke in 2017 most likely due to small vessel disease.  Also had a PFO that was subsequently closed.  In 2018 he also had a TIA.  I have asked him to continue Plavix and Lipitor 20 mg daily.  Goal LDL of less than 100.   Maintain blood pressure less than 130/90  I will give him a PT referral for mild left arm weakness/stiffness.    2.  Tension headache  I have told him to increase his nortriptyline to 50 mg nightly and stop amitriptyline as it makes him groggy  He also takes gabapentin 400 mg nightly for muscle spasms           Return in about 9 months (around 12/20/2024).     I spent 25 minutes out of it 20 minutes were spent face-to-face    Lexie Broderick MD

## 2024-04-04 ENCOUNTER — OFFICE VISIT (OUTPATIENT)
Dept: UROLOGY | Facility: CLINIC | Age: 54
End: 2024-04-04
Payer: COMMERCIAL

## 2024-04-04 VITALS
WEIGHT: 190 LBS | DIASTOLIC BLOOD PRESSURE: 98 MMHG | HEART RATE: 78 BPM | BODY MASS INDEX: 25.73 KG/M2 | OXYGEN SATURATION: 96 % | SYSTOLIC BLOOD PRESSURE: 140 MMHG | HEIGHT: 72 IN

## 2024-04-04 DIAGNOSIS — N52.9 ERECTILE DYSFUNCTION, UNSPECIFIED ERECTILE DYSFUNCTION TYPE: ICD-10-CM

## 2024-04-04 DIAGNOSIS — R39.9 LOWER URINARY TRACT SYMPTOMS (LUTS): Primary | ICD-10-CM

## 2024-04-04 NOTE — PROGRESS NOTES
Follow Up Office Visit      Patient Name: Claude Masterson  : 1970   MRN: [id number]     Chief Complaint:    Chief Complaint   Patient presents with    Erectile Dysfunction    Lower Urinary Tract Symptoms        Referring Provider: No ref. provider found    History of Present Illness: Claude Masterson is a 53 y.o. male who presents today for follow up of lower urinary tract symptoms and erectile dysfunction.  He was seen in consultation on 3/7/2024.  He was having issues with straining to urinate, feelings of incomplete bladder emptying, urinary frequency, urgency and nocturia.  His IPSS at the time was 14 and a PVR of 2 mL.  He was also having issues with erectile dysfunction and had not undergone any prior treatments for this.  He was started on Flomax for his lower urinary tract symptoms and as needed Cialis for ED.    Since starting the Flomax, patient endorses significantly improved stream.  He feels like he is emptying his bladder.  He has seen reductions in urinary frequency, urgency and nocturia as well.  He also states that in addition to the Flomax, he is also reduced his intake of caffeinated beverages further.  He denies any dysuria, suprapubic pain, flank pain, gross hematuria.    Reports that the Cialis has been helpful.  He is now able to obtain and maintain erections sufficient for penetrative intercourse.  He is denying any side effects to the medication.  Denies any episodes of prolonged erections lasting more than 2 hours.    IPSS 8 with mixed QOL      Subjective      Review of System: Review of Systems   Genitourinary:  Negative for decreased urine volume, difficulty urinating, dysuria, enuresis, flank pain, frequency, hematuria and urgency.      I have reviewed the ROS documented by my clinical staff, I have updated appropriately and I agree. Philip Parsons PA-C    I have reviewed and the following portions of the patient's history were updated as appropriate: past family history, past  medical history, past social history, past surgical history and problem list.    Medications:     Current Outpatient Medications:     amantadine (SYMMETREL) 100 MG tablet, , Disp: , Rfl:     amLODIPine (NORVASC) 10 MG tablet, Take 1 tablet by mouth Daily., Disp: , Rfl:     atorvastatin (LIPITOR) 20 MG tablet, Take 1 tablet by mouth Daily., Disp: 90 tablet, Rfl: 3    clopidogrel (PLAVIX) 75 MG tablet, Take 1 tablet by mouth Daily., Disp: , Rfl:     Diclofenac Sodium (VOLTAREN) 1 % gel gel, APPLY 2 GRAMS TOPICALLY TO THE AFFECTED AREA 2 TO 3 TIMES DAILY AS NEEDED FOR PAIN, Disp: , Rfl:     gabapentin (NEURONTIN) 400 MG capsule, Take 1 capsule 3 times a day by oral route., Disp: , Rfl:     nortriptyline (PAMELOR) 25 MG capsule, Take 2 capsules by mouth Every Night., Disp: 60 capsule, Rfl: 11    oxyCODONE-acetaminophen (PERCOCET)  MG per tablet, , Disp: , Rfl:     tadalafil (Cialis) 10 MG tablet, Take 1 tablet by mouth As Needed for Erectile Dysfunction., Disp: 20 tablet, Rfl: 1    tamsulosin (FLOMAX) 0.4 MG capsule 24 hr capsule, Take 1 capsule by mouth Daily., Disp: 30 capsule, Rfl: 1    Allergies:   Allergies   Allergen Reactions    Penicillins Hives    Sulfa Antibiotics Hives     Unsure if it was a specific sulfa drug or all sulfa drugs    Hydrocodone-Acetaminophen Rash       IPSS Questionnaire (AUA-7):  Over the past month…    1)  Incomplete Emptying:       How often have you had a sensation of not emptying you had the sensation of not emptying your bladder completely after you finished urinating?  2 - Less than half the time   2)  Frequency:       How often have you had the urinate again less than two hours after you finished urinating?  2 - Less than half the time   3)  Intermittency:       How often have you found you stopped and started again several times when you urinated?   2 - Less than half the time   4) Urgency:      How often have you found it difficult to postpone urination?  1 - Less than 1 time  "in 5   5) Weak Stream:      How often have you had a weak urinary stream?  0 - Not at all   6) Straining:       How often have you had to push or strain to begin urination?  0 - Not at all   7) Nocturia:      How many times did you most typically get up to urinate from the time you went to bed at night until the time you got up in the morning?  1 - 1 time   Total Score:  8   The International Prostate Symptom Score (IPSS) is used to screen, diagnose, track symptoms of benign prostatic hyperplasia (BPH).   0-7 (Mild Symptoms) 8-19 (Moderate) 20-35 (Severe)   Quality of Life (QoL):  If you were to spend the rest of your life with your urinary condition just the way it is now, how would you feel about that? 3-Mixed   Urine Leakage (Incontinence) 0-No Leakage       Objective     Physical Exam:   Vital Signs:   Vitals:    04/04/24 1127   BP: 140/98   Pulse: 78   SpO2: 96%   Weight: 86.2 kg (190 lb)   Height: 182.9 cm (72\")   PainSc: 0-No pain     Body mass index is 25.77 kg/m².     Physical Exam  Constitutional:       Appearance: Normal appearance.   HENT:      Head: Normocephalic and atraumatic.      Nose: Nose normal.   Eyes:      Extraocular Movements: Extraocular movements intact.      Conjunctiva/sclera: Conjunctivae normal.      Pupils: Pupils are equal, round, and reactive to light.   Musculoskeletal:         General: Normal range of motion.      Cervical back: Normal range of motion and neck supple.   Skin:     General: Skin is warm and dry.   Neurological:      General: No focal deficit present.      Mental Status: He is alert and oriented to person, place, and time. Mental status is at baseline.   Psychiatric:         Mood and Affect: Mood normal.         Behavior: Behavior normal.         Labs:   Brief Urine Lab Results       None                Lab Results   Component Value Date    GLUCOSE 92 01/20/2023    CALCIUM 8.8 01/20/2023     01/20/2023    K 4.4 01/20/2023    CO2 25.0 01/20/2023     " 01/20/2023    BUN 8 01/20/2023    CREATININE 0.96 01/20/2023    BCR 8.3 01/20/2023    ANIONGAP 10.0 01/20/2023       Lab Results   Component Value Date    WBC 7.18 01/20/2023    HGB 14.2 01/20/2023    HCT 41.6 01/20/2023    MCV 89.1 01/20/2023     01/20/2023       Images:   No Images in the past 120 days found..    Measures:   Tobacco:   Claude Masterson  reports that he has never smoked. He has never been exposed to tobacco smoke. He has never used smokeless tobacco.     Assessment / Plan      Assessment/Plan:   53 y.o. male who presented today for follow up of lower urinary tract symptoms and erectile dysfunction after trials of Flomax and Cialis respectively.  Significant improvement in his LUTS with Flomax as well as reduction further reductions in caffeine usage.  IPSS has improved from 14 to 8.  He reports having a much stronger stream and no longer feels like he is not emptying his bladder.  He is happy.  Will continue the Flomax.  Discussed if the symptoms start to worsen down the road, would likely recommend anatomic workup with cystoscopy/uroflow/TRUS.  He is doing well on 10 mg of as needed Cialis for his erectile dysfunction.  He is tolerating medication well.  Will continue current dosing.  Will see him back in 6 months for symptom check with PVR and IPSS.  Knows to call the office the meantime with questions or concerns.  He is happy with the plan.    Diagnoses and all orders for this visit:    1. Lower urinary tract symptoms (LUTS) (Primary)    2. Erectile dysfunction, unspecified erectile dysfunction type       Follow Up:   Return in about 6 months (around 10/4/2024) for Recheck PVR and IPSS.    I spent approximately 20 minutes providing clinical care for this patient; including review of patient's chart and provider documentation, face to face time spent with patient in examination room (obtaining history, performing physical exam, discussing diagnosis and management options), placing orders,  and completing patient documentation.     Philip Parsons PA-C  [facility] [place]

## 2024-04-17 ENCOUNTER — TREATMENT (OUTPATIENT)
Dept: PHYSICAL THERAPY | Facility: CLINIC | Age: 54
End: 2024-04-17
Payer: COMMERCIAL

## 2024-04-17 DIAGNOSIS — R29.898 LEFT HAND WEAKNESS: ICD-10-CM

## 2024-04-17 DIAGNOSIS — R27.0 ATAXIA: ICD-10-CM

## 2024-04-17 DIAGNOSIS — R29.898 WEAKNESS OF LEFT LOWER EXTREMITY: Primary | ICD-10-CM

## 2024-04-17 DIAGNOSIS — R26.89 BALANCE PROBLEM: ICD-10-CM

## 2024-04-17 NOTE — PROGRESS NOTES
Physical Therapy Initial Evaluation and Plan of Care      Patient: Claude Masterson   : 1970  Diagnosis/ICD-10 Code:  Weakness of left lower extremity [R29.898]  Referring practitioner: Lexie Broderick MD  Date of Initial Visit: 2024  Today's Date: 2024  Patient seen for 1 sessions  [facility]  [address]         Subjective Questionnaire: n/a  TU.46 sec  10 M: 9.51 sec  FGA:   KOCH/56    Subjective Evaluation    History of Present Illness  Mechanism of injury: Pt is here for PT services secondary to left sided weakness from CVA in 2017.  Pt reports that after having the stroke he had therapy in [place] b/c he was staying with his sister.  Pt reports that he still has muscle spasms in his leg hand and leg.  Pt has not had any falls.        Patient Occupation: disability; used to work in construction Quality of life: good    Pain  Current pain ratin    Social Support  Lives in: one-story house  Lives with: girlfriend and dog named tong.    Hand dominance: left (trying to use the R hand more b/c of stroke)    Patient Goals  Patient goal: get left side working better           Objective          Neurological Testing     Sensation     Ankle/Foot   Left Ankle/Foot   Intact: light touch and proprioception    Right Ankle/Foot   Intact: light touch and proprioception     Strength/Myotome Testing     Left Hip   Planes of Motion   Flexion: 4+  Extension: 4+  Abduction: 4  Adduction: 4    Right Hip   Planes of Motion   Flexion: 5  Extension: 5  Abduction: 5  Adduction: 5    Left Knee   Flexion: 4+  Extension: 4+    Right Knee   Flexion: 5  Extension: 5    Left Ankle/Foot   Dorsiflexion: 4+  Plantar flexion: 4+    Right Ankle/Foot   Dorsiflexion: 5  Plantar flexion: 5    Ambulation     Ambulation: Level Surfaces     Additional Level Surfaces Ambulation Details  L LE ataxia throughout gait with pt veering with inconsistent foot placement at times.      Ambulation: Stairs   Ascend stairs: independent  Pattern: reciprocal  Railings: without rails  Descend stairs: independent  Pattern: reciprocal  Railings: without rails    Additional Stairs Ambulation Details  Pt caught L heel on edge of one step with descending and performs high stepping for L LE clearance with ascending     General Comments     Ankle/Foot Comments   Decreased L LE speed with GIOVANY       PT Neuro         Assessment & Plan       Assessment  Impairments: abnormal coordination, abnormal gait, activity intolerance, impaired balance, impaired physical strength and lacks appropriate home exercise program   Functional limitations: carrying objects, lifting, walking, pulling, pushing and standing   Assessment details: Pt presents with evolving symptoms following CVA.  Pt educated on ataxia and how his symptoms can improve but that ataxia will never resolve.  Pt to benefit from OT services secondary to L UE ataxia and fine motor impairment.  Pt to benefit from skilled PT services to improve balance and overall functional mobility.  Prognosis: good    Goals  Plan Goals: STG (4 visits)  1. Patient to improve KOCH balance score to >/= 50 /56 to decrease client's risk of falls.  2. Patient to perform TUG within 9 sec without LOB for improved functional mobility.  3. Patient to ambulate 10 meters without AD within 9 sec without LOB for improved gait nishant and functional mobility.  4. Patient to improve FGA score to >/= 26 /30 to decrease client's risk of falls.    LTG (8 visits)  1. Patient to improve KOCH balance score to >/= 54 /56 to decrease client's risk of falls.  2. Patient to perform TUG within 8 sec without LOB for improved functional mobility.  3. Patient to ambulate 10 meters without AD within 8 sec without LOB for improved gait nishant and functional mobility.  4. Patient to improve FGA score to >/= 28 /30 to decrease client's risk of falls.  5. Patient to be I with HEP.    Plan  Therapy options:  will be seen for skilled therapy services  Planned modality interventions: TENS  Planned therapy interventions: fine motor coordination training, gait training, functional ROM exercises, home exercise program, neuromuscular re-education, strengthening, stretching, abdominal trunk stabilization and balance/weight-bearing training  Frequency: 1x week  Duration in visits: 8  Treatment plan discussed with: patient  Plan details: Patient will be seen 1x/wk x 8 visits with treatment to include strengthening, stretching, functional e-stim therapy, neuromuscular re-education, balance, gait and endurance training.         Timed:  Manual Therapy:    0     mins  90539;  Therapeutic Exercise:    10     mins  83989;     Neuromuscular Hitesh:    0    mins  84353;    Therapeutic Activity:     0     mins  02535;     Gait Trainin     mins  87974;     Ultrasound:     0     mins  95622;    Electrical Stimulation:    0     mins  60351 ( );    Untimed:  Electrical Stimulation:    0     mins  21886 ( );  Mechanical Traction:    0     mins  24311;     Timed Treatment:   10   mins   Total Treatment:     45   mins    PT SIGNATURE: Sonia Us, PT   DATE TREATMENT INITIATED: 2024    Initial Certification  Certification Period: 2024thru7/15/2024  I certify that the therapy services are furnished while this patient is under my care.  The services outlined above are required by this patient, and will be reviewed every 90 days.     PHYSICIAN: Lexie Broderick MD  NPI: [id number]            DATE:                               Please sign and return via fax to [phone].. Thank you, [facility].

## 2024-04-19 ENCOUNTER — TREATMENT (OUTPATIENT)
Dept: PHYSICAL THERAPY | Facility: CLINIC | Age: 54
End: 2024-04-19
Payer: COMMERCIAL

## 2024-04-19 DIAGNOSIS — I69.393 ATAXIA DUE TO OLD CEREBRAL INFARCTION: Primary | ICD-10-CM

## 2024-04-19 DIAGNOSIS — R27.8 DECREASED COORDINATION: ICD-10-CM

## 2024-04-19 PROCEDURE — 97167 OT EVAL HIGH COMPLEX 60 MIN: CPT | Performed by: OCCUPATIONAL THERAPIST

## 2024-04-19 PROCEDURE — 97530 THERAPEUTIC ACTIVITIES: CPT | Performed by: OCCUPATIONAL THERAPIST

## 2024-04-19 NOTE — PROGRESS NOTES
Occupational Therapy Initial Evaluation and Plan of Care  [facility]  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 He sustained a L sided CVA in 2017. Pt reports that after having the stroke he had therapy in [place] b/c he was staying with his sister.  Pt reports that he still has muscle spasms in his leg hand and leg. He has tried Baclofen and is now on another med that he reports does not help spasms/cramping. Pt has not had any falls.    Pt exhibits significant ataxia with L hand, uam in the thumb. It is also present in the upper arm but is able to provide some control with concentration and hard contraction of muscles.   He remains active and still trying to perform tool use around the house (was recently working on a deck at his home).   Has putty and works with it regularly   Has a degenerative issue in R shoulder, injection recently. If he holds onto phone too long, hand will become numb  L hand is numb and tingling-mostly in hand but does intermittently occur in arm, has light touch but is not normal. Does wax and wane. Decreased temperature with L.   Can tie shoes and zip jacket, but cannot carry a drink in L hand w/o holding against his side. Cannot perform numerous buttons well, can do pants with stabilization of L hand. Is able to cut up veggies but not dicing in small pieces. Carry plate and going down steps is very hard.       Patient Occupation:  disability; used to work in construction Quality of life: good    Pain  Current pain ratin    Social Support  Lives in: one-story house  Lives with: girlfriend and dog named tong.    Hand dominance: left (trying to use the R hand more b/c of stroke)    Patient Goals  Patient goals for therapy: increased strength, independence with ADLs/IADLs, improved balance and increased motion  Patient goal: improve L UE       Objective          Active Range of Motion     Additional Active Range of Motion Details  Finger to nose L impaired d/t ataxia, w/clinched hand accuracy is much better than relaxed hand    Opposition L hand able to perform however with ataxia present      OT Neuro      OT Exercises       Row Name 24 0800             Exercise 1    Exercise Name 1 OT IE completed per consult; pt agreeable to POC/goals  -ST         Exercise 2    Exercise Name 2 KTaping performed to L thumb with additional support in flexion and abduction  -ST      Cueing 2 --  then completed isolated pad to pad grasp with retrieval of varying sized and shaped items  -ST                User Key  (r) = Recorded By, (t) = Taken By, (c) = Cosigned By      Initials Name Provider Type    Jodie Payne, OTR Occupational Therapist                 Assessment & Plan       Assessment  Impairments: abnormal coordination, abnormal gait, abnormal muscle firing, abnormal muscle tone, abnormal or restricted ROM, activity intolerance, impaired balance, impaired physical strength, lacks appropriate home exercise program and safety issue   Functional limitations: carrying objects, lifting, walking, pulling, pushing, uncomfortable because of pain, moving in bed, standing, stooping, reaching behind back, reaching overhead and unable to perform repetitive tasks   Assessment details: Pt presents with significant ataxia of L UE, uam in hand impacting accuracy of targeting. This is more difficult with in-hand manipulation and translation activities  and ataxia is very apparent in the thumb. Trialed KTaping of the thumb for additional support which aided in better pad to pad accuracy and stability. Discussed potential for trialing thumb spica splint for improving CMC stability. Pt to benefit from extensive skilled OT services to address deficits and promote increased independence, safety and engagement in daily tasks in order to promote improved pt satisfaction and overall QOL. This will be accomplished through methods of ADL/IADL retraining, NMR techniques, sensory re-ed, ROM/stretching, strengthening, WB'ing, AE/DME needs, home/activity/environmental modifications, balance training, among other needs as they arise.     Prognosis: fair    Goals  Plan Goals:   Pt will increase L  strength by 5 or more # by 12 wks to demonstrate improved strength and function for daily tasks.     Pt will improve L handed score on the Quick Dash by 20 or more points to demonstrate increased engagement and independence with ADL/IADL tasks by 12 wks.     Pt will be independent with in-hand manipulation, translation and targeting skill related HEPs for improving accuracy and speed with self feeding including spearing, scooping and cutting/chopping food for home/community environments by 6 wks.     Pt will be independent with L handed targeting HEPs to increase accuracy with distal reaching and placing of items for carry over with tool use while being timed to increase pressure with 75% accuracy in 5/5 trials by 12 wks.     Pt will be independent with R HW HEPs to increase pt satisfaction with legibility for carry over with filling out medical forms and check ledger by 12 wks by using appropriate modifications.       Plan  Planned therapy interventions: ADL retraining, balance/weight-bearing training, fine motor coordination training, flexibility, functional ROM exercises, home exercise program, motor coordination training, neuromuscular re-education, postural training,  strengthening, stretching, therapeutic activities, transfer training, abdominal trunk stabilization, gait training and IADL retraining  Frequency: 1x week  Duration in visits: 12  Treatment plan discussed with: patient  Plan details: Est OT POC and goals to increase independence, safety and engagement in daily tasks.         See flow sheets and activity logs for additional details     Timed:  Manual Therapy:    0     mins  66309;  Therapeutic Exercise:    0     mins  16830;     Neuromuscular Hitesh:    0    mins  90169;    Therapeutic Activity:     15     mins  96008;     Self-Care/ADL     0     mins  40457;   Sensory Int. Tech      0     mins 81029;  Ultrasound:     0     mins  29355;    Electrical Stimulation:    0     mins  64619 ( );    Untimed:  Electrical Stimulation:    0     mins  21397 ( );    Timed Treatment:   15   mins   Total Treatment:     45   mins    OT Signature: Jodie Montero MS, OTR/L, CSRS  [place] License #: [id number]  DATE TREATMENT INITIATED: 4/19/2024    Initial Certification Certification Period: 4/19/2024 thru 7/17/2024  I certify that the therapy services are furnished while this patient is under my care. The services outlined above are required by this patient and will be reviewed every 90 days.     PHYSICIAN: Lexie Broderick MD  NPI: [id number]                                      DATE:     Physician Signature: __________________________________  Lexie Broderick MD    Please sign and return via fax to [phone]  Thank you,   [facility] Occupational Therapy

## 2024-04-26 ENCOUNTER — TREATMENT (OUTPATIENT)
Dept: PHYSICAL THERAPY | Facility: CLINIC | Age: 54
End: 2024-04-26
Payer: COMMERCIAL

## 2024-04-26 DIAGNOSIS — R27.8 DECREASED COORDINATION: Primary | ICD-10-CM

## 2024-04-26 PROCEDURE — 97110 THERAPEUTIC EXERCISES: CPT | Performed by: OCCUPATIONAL THERAPIST

## 2024-04-26 PROCEDURE — 97112 NEUROMUSCULAR REEDUCATION: CPT | Performed by: OCCUPATIONAL THERAPIST

## 2024-04-26 PROCEDURE — 97530 THERAPEUTIC ACTIVITIES: CPT | Performed by: OCCUPATIONAL THERAPIST

## 2024-04-26 RX ORDER — ATORVASTATIN CALCIUM 20 MG/1
20 TABLET, FILM COATED ORAL DAILY
Qty: 90 TABLET | Refills: 2 | Status: SHIPPED | OUTPATIENT
Start: 2024-04-26

## 2024-04-26 NOTE — PROGRESS NOTES
Occupational Therapy Daily Treatment Note  Visit: 2  Date of Initial Visit: Type: THERAPY  Noted: 2024      Patient: Claude Masterson   : 1970  Diagnosis/ICD-10 Code:  Decreased coordination [R27.8]  Referring practitioner: Lexie Broderick MD  Date of Initial Visit: Type: THERAPY  Noted: 2024  Today's Date: 2024  Patient seen for 2 sessions      Subjective:   Patient reports: no complaints  Pain: 0/10   Subjective Questionnaire: n/a  Clinical Progress: improved  Home Program Compliance: Yes  Treatment has included: therapeutic exercise, neuromuscular re-education, and therapeutic activity    Subjective   Objective     See flow sheets and activity logs for additional details     OT Neuro          Assessment & Plan       Assessment  Impairments: abnormal coordination, abnormal gait, abnormal muscle firing, abnormal muscle tone, abnormal or restricted ROM, activity intolerance, impaired balance, impaired physical strength, lacks appropriate home exercise program and safety issue   Functional limitations: carrying objects, lifting, walking, pulling, pushing, uncomfortable because of pain, moving in bed, standing, stooping, reaching behind back, reaching overhead and unable to perform repetitive tasks   Assessment details: Addressed control and accuracy of movement with LUE through various methods including WB'ing and extension of elbow and/or wrist in specific positions to aid in better control of ataxic limb movement. Pt with notable improvement with proximal plns but still has difficulty with more distal tasks. KTape trialed with addition of digits II, III and IV along with thumb which improved overall control with pad to pad prehension of varying sized and shaped objects. Pt also demonstrates greater accuracy w/control with maintaining flexed grasp of digits III-V when utilizing pincer or lateral type grasps.   Pt to benefit from continued skilled OT services to address deficits and promote  increased independence, safety and engagement in daily tasks in order to promote improved pt satisfaction and overall QOL. This will be accomplished through methods of ADL/IADL retraining, NMR techniques, sensory re-ed, ROM/stretching, strengthening, WB'ing, AE/DME needs, home/activity/environmental modifications, balance training, among other needs as they arise.     Prognosis: fair    Goals  Plan Goals:   Pt will increase L  strength by 5 or more # by 12 wks to demonstrate improved strength and function for daily tasks.     Pt will improve L handed score on the Quick Dash by 20 or more points to demonstrate increased engagement and independence with ADL/IADL tasks by 12 wks.     Pt will be independent with in-hand manipulation, translation and targeting skill related HEPs for improving accuracy and speed with self feeding including spearing, scooping and cutting/chopping food for home/community environments by 6 wks.     Pt will be independent with L handed targeting HEPs to increase accuracy with distal reaching and placing of items for carry over with tool use while being timed to increase pressure with 75% accuracy in 5/5 trials by 12 wks.     Pt will be independent with R HW HEPs to increase pt satisfaction with legibility for carry over with filling out medical forms and check ledger by 12 wks by using appropriate modifications.       Plan  Planned therapy interventions: ADL retraining, balance/weight-bearing training, fine motor coordination training, flexibility, functional ROM exercises, home exercise program, motor coordination training, neuromuscular re-education, postural training, strengthening, stretching, therapeutic activities, transfer training, abdominal trunk stabilization, gait training and IADL retraining  Treatment plan discussed with: patient  Plan details: Continue w/OT POC and goals to increase independence, safety and engagement in daily tasks.         Visit Diagnoses:    ICD-10-CM  ICD-9-CM   1. Decreased coordination  R27.8 781.3             Timed:  Manual Therapy:    0     mins  35658;  Therapeutic Exercise:    10     mins  71208;     Neuromuscular Hitesh:    20    mins  93699;    Therapeutic Activity:     15     mins  11683;     Self-Care/ADL     0     mins  89202;   Sensory Int. Tech      0     mins 49291;  Ultrasound:     0     mins  86105;    Electrical Stimulation:    0     mins  69947 ( );    Untimed:  Electrical Stimulation:    0     mins  45921 ( );    Timed Treatment:   45   mins   Total Treatment:     45   mins    OT Signature: Jodie Montero MS, OTR/L, CSRS  [place] License #: [id number]

## 2024-04-26 NOTE — TELEPHONE ENCOUNTER
Rx Refill Note  Requested Prescriptions     Pending Prescriptions Disp Refills    atorvastatin (LIPITOR) 20 MG tablet [Pharmacy Med Name: ATORVASTATIN 20MG TABLETS] 90 tablet 3     Sig: TAKE 1 TABLET BY MOUTH DAILY      Last filled:3/20/23 3 refills  Last office visit with prescribing clinician: 3/20/2024      Next office visit with prescribing clinician: 12/23/2024     WILLI MICHAELS  04/26/24, 07:21 EDT    Sent to pharmacy with 2 refills

## 2024-04-29 ENCOUNTER — TREATMENT (OUTPATIENT)
Dept: PHYSICAL THERAPY | Facility: CLINIC | Age: 54
End: 2024-04-29
Payer: COMMERCIAL

## 2024-04-29 DIAGNOSIS — R27.8 DECREASED COORDINATION: Primary | ICD-10-CM

## 2024-04-29 DIAGNOSIS — R27.0 ATAXIA: Primary | ICD-10-CM

## 2024-04-29 DIAGNOSIS — I69.393 ATAXIA DUE TO OLD CEREBRAL INFARCTION: ICD-10-CM

## 2024-04-29 PROCEDURE — 97110 THERAPEUTIC EXERCISES: CPT | Performed by: PHYSICAL THERAPIST

## 2024-04-29 PROCEDURE — 97112 NEUROMUSCULAR REEDUCATION: CPT | Performed by: OCCUPATIONAL THERAPIST

## 2024-04-29 PROCEDURE — 97110 THERAPEUTIC EXERCISES: CPT | Performed by: OCCUPATIONAL THERAPIST

## 2024-04-29 PROCEDURE — 97112 NEUROMUSCULAR REEDUCATION: CPT | Performed by: PHYSICAL THERAPIST

## 2024-04-29 PROCEDURE — 97530 THERAPEUTIC ACTIVITIES: CPT | Performed by: OCCUPATIONAL THERAPIST

## 2024-04-29 NOTE — PROGRESS NOTES
Occupational Therapy Daily Treatment Note  Visit: 3  Date of Initial Visit: Type: THERAPY  Noted: 2024      Patient: Claude Masterson   : 1970  Diagnosis/ICD-10 Code:  Decreased coordination [R27.8]  Referring practitioner: Lexie Broderick MD  Date of Initial Visit: Type: THERAPY  Noted: 2024  Today's Date: 2024  Patient seen for 3 sessions      Subjective:   Patient reports: no complaints  Pain: 0/10  Subjective Questionnaire: n/a  Clinical Progress: improved  Home Program Compliance: Yes  Treatment has included: therapeutic exercise, neuromuscular re-education, and therapeutic activity    Subjective   Objective     See flow sheets and activity logs for additional details     OT Neuro          OT Exercises       Row Name 24 0808             Exercise 1    Exercise Name 1 neural priming, BUE ROM, strengthening, and act edenilson building for BUE's for carry over w/ADL and IADL performance  -ST      Equipment 1 UE Ergometer  -ST      Time (Minutes) 1 6  -ST         Exercise 2    Exercise Name 2 removing and placing clothing from higher to lower surfaces  -ST         Exercise 3    Exercise Name 3 application of KTape to dorsal aspect of digits II/III and for stabilization of thumb in slight extension and adduction with IP extension  -ST         Exercise 4    Exercise Name 4 s/p application of KTape, removing and placing clothing for higher surface to lower and lower to higher surface, utilizing varying grasp patterns to trial better success/efficiency  -ST         Exercise 5    Exercise Name 5 retrieval of items with stacking, use of wrist extension to aid in helping slow and calm ataxia  -ST         Exercise 6    Exercise Name 6 B shoulder ab/adduction, extended elbow for greater LUE control to aid with symmetry b/t R/L and control of unwanted movement  -ST      Equipment 6 Theraband  -ST      Resistance 6 Yellow  -ST      Sets 6 3  -ST      Reps 6 10  -ST         Exercise 7    Exercise Name 7 alt B  shoulder horizontal ab/adduction (see above)  -ST      Equipment 7 Theraband  -ST      Resistance 7 Yellow  -ST      Sets 7 2  -ST      Reps 7 10  -ST                User Key  (r) = Recorded By, (t) = Taken By, (c) = Cosigned By      Initials Name Provider Type    Jodie Payne OTR Occupational Therapist                     Assessment & Plan       Assessment  Impairments: abnormal coordination, abnormal gait, abnormal muscle firing, abnormal muscle tone, abnormal or restricted ROM, activity intolerance, impaired balance, impaired physical strength, lacks appropriate home exercise program and safety issue   Functional limitations: carrying objects, lifting, walking, pulling, pushing, uncomfortable because of pain, moving in bed, standing, stooping, reaching behind back, reaching overhead and unable to perform repetitive tasks   Assessment details:   Addressed control and accuracy of movement with LUE through various methods including WB'ing and extension of elbow and/or wrist in specific positions to aid in better control of ataxic limb movement. Pt with notable improvement with proximal plns but still has difficulty with more distal tasks. KTape trialed with addition of digits II, III and IV along with thumb which improved overall control with pad to pad prehension of varying sized and shaped objects. Pt also demonstrates greater accuracy w/control with maintaining flexed grasp of digits III-V when utilizing pincer or lateral type grasps.   Pt to benefit from continued skilled OT services to address deficits and promote increased independence, safety and engagement in daily tasks in order to promote improved pt satisfaction and overall QOL. This will be accomplished through methods of ADL/IADL retraining, NMR techniques, sensory re-ed, ROM/stretching, strengthening, WB'ing, AE/DME needs, home/activity/environmental modifications, balance training, among other needs as they arise.     Prognosis:  fair    Goals  Plan Goals:   Pt will increase L  strength by 5 or more # by 12 wks to demonstrate improved strength and function for daily tasks.     Pt will improve L handed score on the Quick Dash by 20 or more points to demonstrate increased engagement and independence with ADL/IADL tasks by 12 wks.     Pt will be independent with in-hand manipulation, translation and targeting skill related HEPs for improving accuracy and speed with self feeding including spearing, scooping and cutting/chopping food for home/community environments by 6 wks.     Pt will be independent with L handed targeting HEPs to increase accuracy with distal reaching and placing of items for carry over with tool use while being timed to increase pressure with 75% accuracy in 5/5 trials by 12 wks.     Pt will be independent with R HW HEPs to increase pt satisfaction with legibility for carry over with filling out medical forms and check ledger by 12 wks by using appropriate modifications.       Plan  Planned therapy interventions: ADL retraining, balance/weight-bearing training, fine motor coordination training, flexibility, functional ROM exercises, home exercise program, motor coordination training, neuromuscular re-education, postural training, strengthening, stretching, therapeutic activities, transfer training, abdominal trunk stabilization, gait training and IADL retraining  Treatment plan discussed with: patient  Plan details: Continue w/OT POC and goals to increase independence, safety and engagement in daily tasks.         Visit Diagnoses:    ICD-10-CM ICD-9-CM   1. Decreased coordination  R27.8 781.3   2. 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 Tech      0     mins  35662;  Ultrasound:     0     mins  64562;    Electrical Stimulation:    0     mins  10279 ( );    Untimed:  Electrical Stimulation:    0     mins  42544 ( );    Timed Treatment:   38   mins   Total Treatment:     38   mins    OT Signature: Jodie Montero MS, OTR/L, CSRS  [place] License #: [id number]

## 2024-04-29 NOTE — PROGRESS NOTES
"Physical Therapy Daily Treatment Note  Visit: 2  Date of Initial Visit: Type: THERAPY  Noted: 2024  Ataxia [R27.0]  Claude Masterson reports: \"I'm doing alright.\"    Subjective Evaluation    Pain  Current pain ratin           Objective   See Exercise, Manual, and Modality Logs for complete treatment.    Exercise 1  Exercise Name 1: NuStep B UE/LEs  Equipment/Resistance 1: level 7  Time: 10 min  Exercise 2  Exercise Name 2: Issued and performed Frenkels exercises in supine and sitting, see for details  Equipment/Resistance 2: VCing and CGA  Sets/Reps 2: 10  Exercise 3  Exercise Name 3: Quadruped oppposite LEs alternating with 3 sec hold  Equipment/Resistance 3: VCing  Sets/Reps 3: 10  Exercise 4  Exercise Name 4: Tall kneeling with lowering bottom onto ankles and back up with equal B LE WB  Sets/Reps 4: 8  Exercise 5  Exercise Name 5: B sidestepping with green tband  Equipment/Resistance 5: green tband  Sets/Reps 5: 5 laps in // bars       PT Neuro          Assessment & Plan       Assessment  Assessment details: Pt requries VCing for Michael's exercises for ataxia.  Pt demonstrates decreased L LE WB with sit to stand along with weakness compared to R LE with B sidestepping.  Pt to benefit from skilled PT services to meet goals.    Plan  Plan details: Pt to benefit from skilled PT services to improve gait, balance, strength, and overall functional mobility.          Manual Therapy:     0     mins  80007;  Therapeutic Exercise:     15     mins  18617;     Neuromuscular Hitesh:     30    mins  07903;    Therapeutic Activity:      0     mins  55495;     Gait Trainin     mins  01473;     Ultrasound:      0     mins  89265;    Electrical Stimulation:     0     mins  84951 ( );  Dry Needling      0     mins self-pay  Traction      0     mins 68175  Canalith Repositioning    0     mins 35103      Timed Treatment:   45   mins   Total Treatment:     45   mins    Sonia Us, PT  [place] License #: " [id number]    Physical Therapist

## 2024-05-06 ENCOUNTER — TREATMENT (OUTPATIENT)
Dept: PHYSICAL THERAPY | Facility: CLINIC | Age: 54
End: 2024-05-06
Payer: COMMERCIAL

## 2024-05-06 DIAGNOSIS — R27.0 ATAXIA: Primary | ICD-10-CM

## 2024-05-06 DIAGNOSIS — R26.89 BALANCE PROBLEM: ICD-10-CM

## 2024-05-06 DIAGNOSIS — R27.8 DECREASED COORDINATION: Primary | ICD-10-CM

## 2024-05-06 DIAGNOSIS — I69.393 ATAXIA DUE TO OLD CEREBRAL INFARCTION: ICD-10-CM

## 2024-05-06 PROCEDURE — 97112 NEUROMUSCULAR REEDUCATION: CPT | Performed by: PHYSICAL THERAPIST

## 2024-05-06 PROCEDURE — 97110 THERAPEUTIC EXERCISES: CPT | Performed by: PHYSICAL THERAPIST

## 2024-05-06 PROCEDURE — 97110 THERAPEUTIC EXERCISES: CPT | Performed by: OCCUPATIONAL THERAPIST

## 2024-05-06 PROCEDURE — 97112 NEUROMUSCULAR REEDUCATION: CPT | Performed by: OCCUPATIONAL THERAPIST

## 2024-05-10 DIAGNOSIS — R39.9 LOWER URINARY TRACT SYMPTOMS (LUTS): ICD-10-CM

## 2024-05-11 RX ORDER — TAMSULOSIN HYDROCHLORIDE 0.4 MG/1
1 CAPSULE ORAL DAILY
Qty: 30 CAPSULE | Refills: 1 | Status: SHIPPED | OUTPATIENT
Start: 2024-05-11

## 2024-05-20 ENCOUNTER — TREATMENT (OUTPATIENT)
Dept: PHYSICAL THERAPY | Facility: CLINIC | Age: 54
End: 2024-05-20
Payer: COMMERCIAL

## 2024-05-20 DIAGNOSIS — R27.8 DECREASED COORDINATION: Primary | ICD-10-CM

## 2024-05-20 DIAGNOSIS — R27.0 ATAXIA: ICD-10-CM

## 2024-05-20 DIAGNOSIS — R26.89 BALANCE PROBLEM: Primary | ICD-10-CM

## 2024-05-20 PROCEDURE — 97112 NEUROMUSCULAR REEDUCATION: CPT | Performed by: OCCUPATIONAL THERAPIST

## 2024-05-20 PROCEDURE — 97112 NEUROMUSCULAR REEDUCATION: CPT | Performed by: PHYSICAL THERAPIST

## 2024-05-20 PROCEDURE — 97110 THERAPEUTIC EXERCISES: CPT | Performed by: PHYSICAL THERAPIST

## 2024-05-20 PROCEDURE — 97110 THERAPEUTIC EXERCISES: CPT | Performed by: OCCUPATIONAL THERAPIST

## 2024-05-20 PROCEDURE — 97530 THERAPEUTIC ACTIVITIES: CPT | Performed by: OCCUPATIONAL THERAPIST

## 2024-05-20 NOTE — PROGRESS NOTES
"PT Progress Note        Patient: Claude Masterson   : 1970  Diagnosis/ICD-10 Code:  Balance problem [R26.89]  Referring practitioner: Lexie Broderick MD  Date of Initial Visit: Type: THERAPY  Noted: 2024  Today's Date: 2024  Patient seen for 4 sessions      Subjective:   Claude Masterson reports: \"I was sick last week.  I was going to call.\"  Subjective Questionnaire: n/a  Clinical Progress: improved  Home Program Compliance: Yes  Treatment has included: therapeutic exercise, neuromuscular re-education, and gait training    Subjective Evaluation    Pain  Current pain ratin         Objective     Exercise 1  Exercise Name 1: Ellitpical B UE/LEs  Equipment/Resistance 1: level 7  Time: 8 min  Exercise 2  Exercise Name 2: Re-assessment completed, see for details  Exercise 3  Exercise Name 3: St balance with alternating tapping one cone and then single LE tapping two cones  Equipment/Resistance 3: two cones; CGA  Sets/Reps 3: 10  Exercise 4  Exercise Name 4: ST balance with elfego vega lunges to spots on floor with emphasis on L LE placement  Equipment/Resistance 4: 5 dots; SBA  Sets/Reps 4: 5  Exercise 5  Exercise Name 5: St balance on trampoline with reg UBALDO, narrow UBALDO and B staggered with EC; st balance with jumping up/down, B ski jumping, B LE hip ab/adduction jumping  Equipment/Resistance 5: trampoline; min/mod A  Sets/Reps 5: 10         Functional Testing  Functional Tests Options: 10 Meter Walk, Timed Up and Go (TUG), Koch Balance  TU.63 sec  10 M: 7.97 sec  KOCH/56    PT Neuro         Assessment & Plan       Assessment  Impairments: abnormal coordination, abnormal gait, activity intolerance, impaired balance, impaired physical strength and lacks appropriate home exercise program   Functional limitations: carrying objects, lifting, walking, pulling, pushing and standing   Assessment details: Pt met STG and LTG's for 10 meter and TUG.  Pt did improve KOCH balance score. Pt requires min/mod A " with standing balance on trampoline with pt performing B hip flexion with increased LOB in order to obtain balance.  Pt continues to demonstrate L LE and UE ataxia. Pt to benefit from skilled PT services to improve balance and overall functional mobility.  Prognosis: good    Goals  Plan Goals: STG (4 visits)  1. Patient to improve KOCH balance score to >/= 50 /56 to decrease client's risk of falls. ONGOING  2. Patient to perform TUG within 9 sec without LOB for improved functional mobility. MET  3. Patient to ambulate 10 meters without AD within 9 sec without LOB for improved gait nishant and functional mobility. MET  4. Patient to improve FGA score to >/= 26 /30 to decrease client's risk of falls. ONGOING    LTG (8 visits)  1. Patient to improve KOCH balance score to >/= 54 /56 to decrease client's risk of falls. ONGOING  2. Patient to perform TUG within 8 sec without LOB for improved functional mobility. MET  3. Patient to ambulate 10 meters without AD within 8 sec without LOB for improved gait nishant and functional mobility. MET  4. Patient to improve FGA score to >/= 28 /30 to decrease client's risk of falls. ONGOING  5. Patient to be I with HEP. ONGOING    Plan  Therapy options: will be seen for skilled therapy services  Planned modality interventions: TENS  Planned therapy interventions: fine motor coordination training, gait training, functional ROM exercises, home exercise program, neuromuscular re-education, strengthening, stretching, abdominal trunk stabilization and balance/weight-bearing training  Frequency: 1x week  Duration in visits: 4  Treatment plan discussed with: patient  Plan details: Patient will be seen 1x/wk x 4 visits with treatment to include strengthening, stretching, functional e-stim therapy, neuromuscular re-education, balance, gait and endurance training.         Visit Diagnoses:    ICD-10-CM ICD-9-CM   1. Balance problem  R26.89 781.99       Progress toward previous goals: Partially  Met        Recommendations: Continue as planned  Timeframe: 4 visits  Prognosis to achieve goals: good    PT Signature: Sonia Us, PT  [place] License #: [id number]    Based upon review of the patient's progress and continued therapy plan, it is my medical opinion that Claude Masterson should continue physical therapy treatment at [facility]  [address] [place] [id number] [phone].    Signature: __________________________________  Lexie Broderick MD    Timed:  Manual Therapy:    0     mins  14211;  Therapeutic Exercise:    10     mins  75816;     Neuromuscular Hitesh:    20    mins  65335;    Therapeutic Activity:     0     mins  27379;     Gait Trainin     mins  23800;     Ultrasound:     0     mins  58440;    Electrical Stimulation:    0     mins  20326 ( );    Untimed:  Electrical Stimulation:    0     mins  30457 ( );  Mechanical Traction:    0     mins  37333;     Timed Treatment:   30   mins   Total Treatment:     30   mins

## 2024-05-20 NOTE — PROGRESS NOTES
OT Progress Note        [facility] [place] [facility]                                              [address]      Patient: Claude Masterson   : 1970  Diagnosis/ICD-10 Code:  Decreased coordination [R27.8]  Referring practitioner: Lexie Broderick MD  Date of Initial Visit: Type: THERAPY  Noted: 2024  Today's Date: 2024  Patient seen for 5 sessions    Subjective:   Patient reports: no complaints, continues to try to utilize L hand as much as possible during daily tasks   Pain: 0/10  Subjective Questionnaire: QuickDASH 40.91  Clinical Progress: improved  Home Program Compliance: Yes  Treatment has included: therapeutic exercise, neuromuscular re-education, and therapeutic activity    See flow sheets and activity logs for additional details     Subjective   Objective     OT Neuro         Assessment & Plan       Assessment  Impairments: abnormal coordination, abnormal gait, abnormal muscle firing, abnormal muscle tone, abnormal or restricted ROM, activity intolerance, impaired balance, impaired physical strength, lacks appropriate home exercise program and safety issue   Functional limitations: carrying objects, lifting, walking, pulling, pushing, uncomfortable because of pain, moving in bed, standing, stooping, reaching behind back, reaching overhead and unable to perform repetitive tasks   Assessment details: Pt progressing with BUE and LUE single movements with extension and active concentration of activating tricep. This aided in pt's control of ataxic movements. Pt able to control movement patterns in f/e, ab/adduction and supination/pronation with mild/minimal ataxia. Pt with most difficulty performing thumb translation of items across palm and movement in hand of L uma with use of retrieving flat items from table surface d/t instability in digits I/II. Pt with improved Quick Dash score as he has been more independent and successful with utilizing L hand in daily tasks.   Pt to  benefit from continued skilled OT services to address deficits and promote increased independence, safety and engagement in daily tasks in order to promote improved pt satisfaction and overall QOL. This will be accomplished through methods of ADL/IADL retraining, NMR techniques, sensory re-ed, ROM/stretching, strengthening, WB'ing, AE/DME needs, home/activity/environmental modifications, balance training, among other needs as they arise.     Prognosis: fair    Goals  Plan Goals:   Pt will increase L  strength by 5 or more # by 12 wks to demonstrate improved strength and function for daily tasks. PROGRESSING    Pt will improve L handed score on the Quick Dash by 20 or more points to demonstrate increased engagement and independence with ADL/IADL tasks by 12 wks. MET    Pt will be independent with in-hand manipulation, translation and targeting skill related HEPs for improving accuracy and speed with self feeding including spearing, scooping and cutting/chopping food for home/community environments by 6 wks. PROGRESSING    Pt will be independent with L handed targeting HEPs to increase accuracy with distal reaching and placing of items for carry over with tool use while being timed to increase pressure with 75% accuracy in 5/5 trials by 12 wks. PARTIALLY MET    Pt will be independent with R HW HEPs to increase pt satisfaction with legibility for carry over with filling out medical forms and check ledger by 12 wks by using appropriate modifications. ONGOING       Plan  Planned therapy interventions: ADL retraining, balance/weight-bearing training, fine motor coordination training, flexibility, functional ROM exercises, home exercise program, motor coordination training, neuromuscular re-education, postural training, strengthening, stretching, therapeutic activities, transfer training, abdominal trunk stabilization, gait training and IADL retraining  Treatment plan discussed with: patient  Plan details: Continue  w/OT POC and goals to increase independence, safety and engagement in daily tasks.        OT Exercises       Row Name 05/20/24 0845             Exercise 1    Exercise Name 1 OT re-assessment performed per POC  -ST         Exercise 2    Exercise Name 2 targeted BUE bicep curls; one set palms up/one set palms down  -ST      Equipment 2 Dowel  -ST      Weights/Plates 2 1  -ST      Sets 2 2  -ST      Reps 2 10  -ST         Exercise 3    Exercise Name 3 L handed shoulder flexion to 90, progression to supination to 90  -ST      Equipment 3 Dowel  -ST      Weights/Plates 3 1  -ST      Sets 3 3  -ST      Reps 3 10  -ST         Exercise 4    Exercise Name 4 L handed shoulder ab/add  -ST      Equipment 4 Dowel  -ST      Weights/Plates 4 1  -ST      Sets 4 3  -ST      Reps 4 10  -ST         Exercise 5    Exercise Name 5 targeted L handed pincer to palmar translation, palmar to pincer translation  -ST         Exercise 6    Exercise Name 6 thumb ab/adduction L hand, control with speed  -ST                User Key  (r) = Recorded By, (t) = Taken By, (c) = Cosigned By      Initials Name Provider Type    Jodie Payne OTR Occupational Therapist                     Visit Diagnoses:    ICD-10-CM ICD-9-CM   1. Decreased coordination  R27.8 781.3   2. Ataxia  R27.0 781.3       Progress toward previous goals: Partially Met      Recommendations: Continue as planned  Timeframe: 2 months  Prognosis to achieve goals: fair    OT Signature: Jodie Montero MS, OTR/L, [facility] License #: [id number]    Based upon review of the patient's progress and continued therapy plan, it is my medical opinion that Claude Masterson should continue occupational therapy treatment at [facility]  [facility] [address] [facility] [address] [phone].    Signature: __________________________________  Lexie Broderick MD    Timed:  Manual Therapy:    0     mins  75077;  Therapeutic Exercise:    15     mins  53983;      Neuromuscular Hitesh:    15    mins  86402;    Therapeutic Activity:     15     mins  28011;     Self-Care/ADL     0     mins  95247;   Sensory Int. Tech      0     mins 73695;  Ultrasound:     0     mins  46141;    Electrical Stimulation:    0     mins  51057 ( );    Untimed:  Electrical Stimulation:    0     mins  90389 ( );    Timed Treatment:   45   mins   Total Treatment:     45   mins

## 2024-05-21 ENCOUNTER — OFFICE VISIT (OUTPATIENT)
Dept: FAMILY MEDICINE CLINIC | Facility: CLINIC | Age: 54
End: 2024-05-21
Payer: COMMERCIAL

## 2024-05-21 VITALS
HEIGHT: 72 IN | WEIGHT: 197.4 LBS | HEART RATE: 89 BPM | OXYGEN SATURATION: 98 % | DIASTOLIC BLOOD PRESSURE: 82 MMHG | BODY MASS INDEX: 26.74 KG/M2 | SYSTOLIC BLOOD PRESSURE: 134 MMHG

## 2024-05-21 DIAGNOSIS — F32.A ANXIETY AND DEPRESSION: ICD-10-CM

## 2024-05-21 DIAGNOSIS — M25.532 LEFT WRIST PAIN: Primary | ICD-10-CM

## 2024-05-21 DIAGNOSIS — H92.02 LEFT EAR PAIN: ICD-10-CM

## 2024-05-21 DIAGNOSIS — I10 HYPERTENSION, UNSPECIFIED TYPE: ICD-10-CM

## 2024-05-21 DIAGNOSIS — F41.9 ANXIETY AND DEPRESSION: ICD-10-CM

## 2024-05-21 DIAGNOSIS — M48.061 SPINAL STENOSIS OF LUMBAR REGION, UNSPECIFIED WHETHER NEUROGENIC CLAUDICATION PRESENT: ICD-10-CM

## 2024-05-21 DIAGNOSIS — M67.432 GANGLION CYST OF WRIST, LEFT: ICD-10-CM

## 2024-05-21 DIAGNOSIS — Z86.73 HISTORY OF STROKE: ICD-10-CM

## 2024-05-21 PROCEDURE — 3075F SYST BP GE 130 - 139MM HG: CPT | Performed by: PHYSICIAN ASSISTANT

## 2024-05-21 PROCEDURE — 1126F AMNT PAIN NOTED NONE PRSNT: CPT | Performed by: PHYSICIAN ASSISTANT

## 2024-05-21 PROCEDURE — 3079F DIAST BP 80-89 MM HG: CPT | Performed by: PHYSICIAN ASSISTANT

## 2024-05-21 PROCEDURE — 99214 OFFICE O/P EST MOD 30 MIN: CPT | Performed by: PHYSICIAN ASSISTANT

## 2024-05-21 PROCEDURE — 69209 REMOVE IMPACTED EAR WAX UNI: CPT | Performed by: PHYSICIAN ASSISTANT

## 2024-05-21 RX ORDER — TIMOLOL MALEATE 5 MG/ML
1 SOLUTION/ DROPS OPHTHALMIC 2 TIMES DAILY
COMMUNITY
Start: 2024-05-10

## 2024-05-21 RX ORDER — HYDROXYZINE HYDROCHLORIDE 25 MG/1
1 TABLET, FILM COATED ORAL 2 TIMES DAILY
COMMUNITY

## 2024-05-21 RX ORDER — FAMOTIDINE 20 MG/1
1 TABLET, FILM COATED ORAL DAILY
COMMUNITY
Start: 2024-03-08

## 2024-05-21 NOTE — PROGRESS NOTES
Chief Complaint   Patient presents with    Annual Exam     Used to go to [facility] in [place] (Serene Calderon)   Knot on left wrist (for about a week) no pain associated with it        HPI     Claude Masterson is a pleasant 53 y.o. male who presents for initial visit.     Moved to this area from [place]. Previously followed with [facility] in [place] for his primary care. He was last seen there around December. Disabled secondary to stroke. He used to work in commercial construction.    Hx CVA 8/27/2017. He has some dexterity and spasticity issues with his left arm. He is currently going to occupational therapy. Follows with neurology, Dr. Broderick. Compliant on plavix and statin.     He states his blood pressure fluctuates but usually runs normal which his current medications.    Two back surgeries: lumbar fusion in '08, revision in '10 with Dr. De La Torre. He [facility] Dr. Craven for right shoulder pain and chronic low back pain and right lower extremity radiculopathy. He is treated with percocet 10s tid and gabapentin 400 mg tid. This regimen is working well.     Glaucoma - treated by  ophthalmology.    Depression: This is under pretty good control. He has episodic low mood due to limitations from his stroke. He has not needed antidepressants in the past. He takes hydroxyzine as needed for anxiety which works well.     He states his left ear has been bothering him for the last 2 weeks. Feels full. He has tried some OTC ear drops to loose up ear wax. Has history of frequent wax buildup. Denies sinus/allergy complaints.     Also has cyst on left wrist - not painful. It has been there for about 1 week. He does have some pain in this area.     Past Medical History:   Diagnosis Date    Anxiety     Anxiety and depression     Arthritis     Cerebrovascular disease     DDD (degenerative disc disease), cervical     Depression     Erectile dysfunction     Glaucoma [id number]     Hormone disorder     Hyperlipidemia     Hypertension     Injury of back     Peripheral neuropathy 8/27)2017    Stroke    Stroke        Past Surgical History:   Procedure Laterality Date    BACK SURGERY  2000    L5-S1    BACK SURGERY  2009    L5-S1 replace hardware    HERNIA REPAIR         Family History   Problem Relation Age of Onset    Hypertension Mother     Hypertension Father        Social History     Socioeconomic History    Marital status: Single   Tobacco Use    Smoking status: Never     Passive exposure: Never    Smokeless tobacco: Never   Vaping Use    Vaping status: Never Used   Substance and Sexual Activity    Alcohol use: No    Drug use: No    Sexual activity: Yes     Partners: Female     Birth control/protection: None       Allergies   Allergen Reactions    Penicillins Hives    Sulfa Antibiotics Hives     Unsure if it was a specific sulfa drug or all sulfa drugs    Hydrocodone-Acetaminophen Rash       ROS    Review of Systems    Vitals:    05/21/24 0951   BP: 134/82   Pulse: 89   SpO2: 98%     Body mass index is 26.77 kg/m².      Current Outpatient Medications:     amantadine (SYMMETREL) 100 MG tablet, , Disp: , Rfl:     amLODIPine (NORVASC) 10 MG tablet, Take 1 tablet by mouth Daily., Disp: , Rfl:     atorvastatin (LIPITOR) 20 MG tablet, TAKE 1 TABLET BY MOUTH DAILY, Disp: 90 tablet, Rfl: 2    clopidogrel (PLAVIX) 75 MG tablet, Take 1 tablet by mouth Daily., Disp: , Rfl:     Diclofenac Sodium (VOLTAREN) 1 % gel gel, APPLY 2 GRAMS TOPICALLY TO THE AFFECTED AREA 2 TO 3 TIMES DAILY AS NEEDED FOR PAIN, Disp: , Rfl:     famotidine (PEPCID) 20 MG tablet, Take 1 tablet by mouth Daily., Disp: , Rfl:     gabapentin (NEURONTIN) 400 MG capsule, Take 1 capsule 3 times a day by oral route., Disp: , Rfl:     nortriptyline (PAMELOR) 25 MG capsule, Take 2 capsules by mouth Every Night., Disp: 60 capsule, Rfl: 11    oxyCODONE-acetaminophen (PERCOCET)  MG per tablet, , Disp: , Rfl:     tadalafil (Cialis) 10 MG  tablet, Take 1 tablet by mouth As Needed for Erectile Dysfunction., Disp: 20 tablet, Rfl: 1    tamsulosin (FLOMAX) 0.4 MG capsule 24 hr capsule, TAKE 1 CAPSULE BY MOUTH DAILY, Disp: 30 capsule, Rfl: 1    timolol (TIMOPTIC) 0.5 % ophthalmic solution, Administer 1 drop to both eyes 2 (Two) Times a Day., Disp: , Rfl:     hydrOXYzine (ATARAX) 25 MG tablet, Take 1 tablet by mouth 2 (Two) Times a Day., Disp: , Rfl:     PE    Physical Exam  Vitals reviewed.   Constitutional:       General: He is not in acute distress.     Appearance: He is well-developed.   HENT:      Head: Normocephalic and atraumatic.      Left Ear: There is impacted cerumen. Tympanic membrane is erythematous.      Ears:     Eyes:      Conjunctiva/sclera: Conjunctivae normal.   Cardiovascular:      Rate and Rhythm: Normal rate and regular rhythm.      Heart sounds: Normal heart sounds. No murmur heard.  Pulmonary:      Effort: Pulmonary effort is normal.      Breath sounds: Normal breath sounds. No wheezing, rhonchi or rales.   Musculoskeletal:        Hands:       Cervical back: Normal range of motion.   Skin:     General: Skin is warm and dry.   Neurological:      Mental Status: He is alert.      Gait: Gait normal.   Psychiatric:         Speech: Speech normal.         Behavior: Behavior normal.       Ear Cerumen Removal    Date/Time: 5/21/2024 10:45 AM    Performed by: Mahendra Muhammad PA-C  Authorized by: Mahendra Muhammad PA-C  Location details: left ear  Patient tolerance: patient tolerated the procedure well with no immediate complications  Comments: Cerumen was successfully removed. Patient notes his ear pain resolved s/p irrigation.   Procedure type: irrigation   Sedation:  Patient sedated: no            A/P    Problem List Items Addressed This Visit          Cardiac and Vasculature    Hypertension    Relevant Medications    amLODIPine (NORVASC) 10 MG tablet       Mental Health    Anxiety and depression    Relevant Medications    nortriptyline  (PAMELOR) 25 MG capsule    hydrOXYzine (ATARAX) 25 MG tablet       Neuro    Spinal stenosis of lumbar region     Other Visit Diagnoses       Left wrist pain    -  Primary    Relevant Orders    Ambulatory Referral to Hand Surgery    Ganglion cyst of wrist, left        Relevant Orders    Ambulatory Referral to Hand Surgery    Left ear pain        History of stroke              -Hypertension is controlled. Continue current treatment.   -Continue follow-up with neurology and pain management for CVA, spinal stenosis.   -L ear cerumen removed with irrigation today. Patient's pain resolved.   -He is interested in referral to hand surgery for management of left wrist ganglion. Referral ordered today.   -Anxiety, depression are controlled. Continue hydroxyzine prn.  -He does not need medication refills. He will notify the office when he needs refills.   -Records release requested today.   -RTC in 7 months for annual physical, sooner prn.       Plan of care was reviewed with patient at the conclusion of today's visit. Education was provided regarding diagnoses, management, prescribed or recommended OTC products, and the importance of compliance with follow-up appointments. The patient was counseled regarding the risks, benefits, and possible side-effects of treatment. I advised the patient to keep me informed of any acute changes in their status including new, worsening, or persistent symptoms. Patient expresses understanding and agreement with the management plan.        Mahendra Muhammad PA-C

## 2024-05-24 ENCOUNTER — PATIENT ROUNDING (BHMG ONLY) (OUTPATIENT)
Dept: FAMILY MEDICINE CLINIC | Facility: CLINIC | Age: 54
End: 2024-05-24
Payer: COMMERCIAL

## 2024-05-28 ENCOUNTER — OFFICE VISIT (OUTPATIENT)
Age: 54
End: 2024-05-28
Payer: COMMERCIAL

## 2024-05-28 VITALS
DIASTOLIC BLOOD PRESSURE: 102 MMHG | SYSTOLIC BLOOD PRESSURE: 140 MMHG | HEIGHT: 72 IN | WEIGHT: 196.8 LBS | BODY MASS INDEX: 26.66 KG/M2

## 2024-05-28 DIAGNOSIS — M25.511 RIGHT SHOULDER PAIN, UNSPECIFIED CHRONICITY: ICD-10-CM

## 2024-05-28 DIAGNOSIS — M77.8 TENDINITIS OF EXTENSOR TENDON OF HAND: Primary | ICD-10-CM

## 2024-05-28 DIAGNOSIS — M25.532 LEFT WRIST PAIN: ICD-10-CM

## 2024-05-28 DIAGNOSIS — M19.039 ARTHRITIS OF WRIST: ICD-10-CM

## 2024-05-28 RX ADMIN — LIDOCAINE HYDROCHLORIDE 0.5 ML: 10 INJECTION, SOLUTION EPIDURAL; INFILTRATION; INTRACAUDAL; PERINEURAL at 15:07

## 2024-05-28 RX ADMIN — TRIAMCINOLONE ACETONIDE 20 MG: 40 INJECTION, SUSPENSION INTRA-ARTICULAR; INTRAMUSCULAR at 15:07

## 2024-05-28 NOTE — PROGRESS NOTES
Procedure   - Hand/Upper Extremity Injection: L extensor compartment 1 for de Quervain's tenosynovitis on 5/28/2024 3:07 PM  Indications: pain  Details: 27 G needle, radial approach  Medications: 0.5 mL lidocaine PF 1% 1 %; 20 mg triamcinolone acetonide 40 MG/ML  Outcome: tolerated well, no immediate complications  Procedure, treatment alternatives, risks and benefits explained, specific risks discussed. Consent was given by the patient. Immediately prior to procedure a time out was called to verify the correct patient, procedure, equipment, support staff and site/side marked as required. Patient was prepped and draped in the usual sterile fashion.

## 2024-05-28 NOTE — PROGRESS NOTES
[facility]     Office Visit       Date: 05/28/2024   Patient Name: Claude Masterson  MRN: [id number]  YOB: 1970    Referring Physician: Mahendra Muhammad PA-C     Chief Complaint:   Chief Complaint   Patient presents with    Left Wrist - Pain    Right Shoulder - Pain     History of Present Illness:   Claude Masterson is a 53 y.o. male left-hand-dominant presenting to clinic with complaints of left wrist pain.  Patient has a prior history of a stroke primarily affecting the left side in August 2017.  He has had weakness, difficulty with dexterity and tremor to the left upper extremity.  He reports developing radial sided pain.  He also endorses a cyst along the radial aspect of his wrist.  He has been in physical therapy for rehab of his stroke.  He has not attempted any prior bracing, injection, or advanced imaging.  He also complains of right shoulder pain today, which is chronic for the patient.    History of prior lumbar fusion.    Subjective   Review of Systems:   Review of Systems   Constitutional: Negative.  Negative for chills, fatigue and fever.   HENT: Negative.  Negative for congestion and dental problem.    Eyes: Negative.  Negative for blurred vision.   Respiratory: Negative.  Negative for shortness of breath.    Cardiovascular: Negative.  Negative for leg swelling.   Gastrointestinal: Negative.  Negative for abdominal pain.   Endocrine: Negative.  Negative for polyuria.   Genitourinary: Negative.  Negative for difficulty urinating.   Musculoskeletal:  Positive for arthralgias.   Skin: Negative.    Allergic/Immunologic: Negative.    Neurological: Negative.    Hematological: Negative.  Negative for adenopathy.   Psychiatric/Behavioral: Negative.  Negative for behavioral problems.       Pertinent review of systems per HPI    I reviewed the patient's chief complaint, history of present illness, review of systems, past  "medical history, surgical history, family history, social history, medications and allergy list in the EMR on 05/28/2024 and agree with the findings above.    Objective    Quality Measures:   ACP:   ACP discussion was declined by the patient.      Tobacco:   Claude Masterson  reports that he has never smoked. He has never been exposed to tobacco smoke. He has never used smokeless tobacco.     Vital Signs:   Vitals:    05/28/24 1435   BP: (!) 140/102   Weight: 89.3 kg (196 lb 12.8 oz)   Height: 182.6 cm (71.89\")     BMI:      General: No acute distress. Alert and oriented.     Ortho Exam:  Examination left upper extremity demonstrates dorsal prominence of the ulna and subtle volar subluxation of the wrist.  There are no obvious skin lesions or abrasions.  No obvious swelling or masses.  Patient does have involuntary activity of the thumb, which she states this is baseline after his stroke.  He is tender along the first extensor compartment worse at the radial styloid.  Positive Finkelstein's test.  He is also somewhat tender over the radiocarpal joint at the tip of the radial styloid.  There is a small palpable cyst that appears to deep to the first center compartment overlying the radiocarpal joint.  This is nontender to palpation.  He is nontender dorsally over the radiocarpal joint and throughout remainder of the hand.  Sensation is grossly intact light touch throughout the hand.  Warm well-perfused distally.    Imaging / Studies:    Imaging Results (Last 24 Hours)       Procedure Component Value Units Date/Time    XR Wrist 3+ View Left [[id number]] Resulted: 05/29/24 0708     Updated: 05/29/24 0710    Narrative:      Left Wrist X-Ray    Indication: Pain    Views:  PA, Lateral, and Oblique     Comparison:  None    Findings:  No fractures or dislocation. No bony lesions. Normal soft tissues.    Increased volar tilt with spurring indicative of underlying arthritic   changes most notable in the lateral.  The alignment " of the scaphoid and   lunate are difficult to assess the lateral, however the lunate does appear   flexed.    Impression:   Mild radiocarpal joint arthritis.            Procedure Note:  After reviewing the risks, benefits and alternatives to a steroid injection, which include but are not limited to; hypopigmentation, fat necrosis/atrophy, pain, swelling, bleeding, bruising, damage to nearby nerves/vessels, allergic reaction , transient elevation in blood glucose levels and infection a verbal consent was obtained. A time-out was then performed and the affected hand was prepped with chlorhexadine soap and ethyl chloride was used to numb the skin. The left first extensor compartment was injected with 0.5cc: 0.5cc mixture of Kenalog - 40 mg/ml and Lidocaine - 1% / 2 ml. The injection was well tolerated and a sterile dressing was applied. There were no complications. I advised the patient that they might experience some local discomfort for the next couple days and can apply ice to the site as needed.    Assessment / Plan    Assessment/Plan:   Claude Masterson is a 53 y.o. male with a left de Quervain's tenosynovitis, mild left radiocarpal joint arthritis and likely associated ganglion cyst.    I discussed with the patient their clinical findings demonstrate de Quervain's tenosynovitis, which is also known as first extensor compartment tendinitis.  We had a lengthy discussion regarding the pathophysiology which was explained to be stenosis of the first dorsal compartment tendons within the tendon sheath against the radial styloid, resulting in pain and inflammation of the tendons. The patient is tender over the radial styloid with a positive Finkelstein's maneuver. Both conservative and surgical options were discussed.  Conservative treatments in the form of: observation, activity modification, anti-inflammatories, hand therapy, thumb spica bracing, and injection were presented.  Operative treatment in the form of first  extensor compartment release was presented.  After expressing understanding of all options, the patient elects to proceed with corticosteroid injection, anti-inflammatories, and thumb spica bracing.  I also discussed with patient that he has a component of underlying radiocarpal arthritis and likely an associated ganglion cyst.  We will continue to monitor his symptoms.  I would like to see how he responds to the first extensor compartment injection.  If not improving may consider a radiocarpal joint corticosteroid injection.  He was agreeable with the plan.  I will see him back in 3 months for reevaluation.       ICD-10-CM ICD-9-CM   1. Tendinitis of extensor tendon of hand  M77.8 727.05   2. Arthritis of wrist  M19.039 716.93   3. Left wrist pain  M25.532 719.43   4. Right shoulder pain, unspecified chronicity  M25.511 719.41     Follow Up:   Return in about 3 months (around 8/28/2024) for Follow Up.      Kelly Duran MD  [facility]

## 2024-05-29 PROBLEM — M77.8 TENDINITIS OF EXTENSOR TENDON OF HAND: Status: ACTIVE | Noted: 2024-05-29

## 2024-05-29 RX ORDER — TRIAMCINOLONE ACETONIDE 40 MG/ML
20 INJECTION, SUSPENSION INTRA-ARTICULAR; INTRAMUSCULAR
Status: COMPLETED | OUTPATIENT
Start: 2024-05-28 | End: 2024-05-28

## 2024-05-29 RX ORDER — LIDOCAINE HYDROCHLORIDE 10 MG/ML
0.5 INJECTION, SOLUTION EPIDURAL; INFILTRATION; INTRACAUDAL; PERINEURAL
Status: COMPLETED | OUTPATIENT
Start: 2024-05-28 | End: 2024-05-28

## 2024-06-03 ENCOUNTER — TREATMENT (OUTPATIENT)
Dept: PHYSICAL THERAPY | Facility: CLINIC | Age: 54
End: 2024-06-03
Payer: COMMERCIAL

## 2024-06-03 DIAGNOSIS — R27.0 ATAXIA: ICD-10-CM

## 2024-06-03 DIAGNOSIS — R26.89 BALANCE PROBLEM: Primary | ICD-10-CM

## 2024-06-03 DIAGNOSIS — I69.393 ATAXIA DUE TO OLD CEREBRAL INFARCTION: ICD-10-CM

## 2024-06-03 DIAGNOSIS — R27.8 DECREASED COORDINATION: Primary | ICD-10-CM

## 2024-06-03 PROCEDURE — 97110 THERAPEUTIC EXERCISES: CPT | Performed by: PHYSICAL THERAPIST

## 2024-06-03 PROCEDURE — 97110 THERAPEUTIC EXERCISES: CPT | Performed by: OCCUPATIONAL THERAPIST

## 2024-06-03 PROCEDURE — 97112 NEUROMUSCULAR REEDUCATION: CPT | Performed by: OCCUPATIONAL THERAPIST

## 2024-06-03 PROCEDURE — 97112 NEUROMUSCULAR REEDUCATION: CPT | Performed by: PHYSICAL THERAPIST

## 2024-06-03 NOTE — PROGRESS NOTES
"Physical Therapy Daily Treatment Note  Visit: 5  Date of Initial Visit: Type: THERAPY  Noted: 2024  Balance problem [R26.89]  Claude Masterson reports: \"My balance is about 60%.\"    Subjective Evaluation    Pain  Current pain ratin           Objective   See Exercise, Manual, and Modality Logs for complete treatment.    Exercise 1  Exercise Name 1: Ellitpical B UE/LEs  Equipment/Resistance 1: level 10  Time: 2 min fwd; 1 min bwd; 8 min total  Exercise 2  Exercise Name 2: fwd walking along ladder with one foot in every square, one foot every other square fwd walking; fwd straddle stepping along ladder; sidestepping with fwd/bwd into each square; fwd sidestepping with two feet in each square fwd and then bwd and repeat in a zig-zag pattern; posterior steping both feet in each square; hold staggered with toss/catch ball and then hold staggered with one foot in each square and bounce/catch ball towards the side with the foot in front  Equipment/Resistance 2: agility ladder; min A  Sets/Reps 2: 65 ft x 2 of each  Exercise 3  Exercise Name 3: B LE fwd stepping up onto/off rounded side of BOSU without UE A with standing balance on top of rounded side of BOSU  Equipment/Resistance 3: BOSU; min A  Sets/Reps 3: 10       PT Neuro          Assessment & Plan       Assessment  Assessment details: Pt requires min A with standing dynamic balance performing agility ladder with LOB up to 20% of the time.  Pt requires VCing to relax L UE when performing agility ladder.  Pt continues to demonstrate ataxia with L UE and LE but demonstrates improved placement in ladder squares.  Pt to benefit from skilled PT services to improve overall functional mobility.    Plan  Plan details: Pt to benefit from skilled PT services to improve gait, balance, strength, and overall functional mobility.          Manual Therapy:     0     mins  25952;  Therapeutic Exercise:     10     mins  46529;     Neuromuscular Hitesh:     35    mins  41143;  "   Therapeutic Activity:      0     mins  52676;     Gait Trainin     mins  53090;     Ultrasound:      0     mins  30234;    Electrical Stimulation:     0     mins  35132 ( );  Dry Needling      0     mins self-pay  Traction      0     mins 49977  Canalith Repositioning    0     mins 33749      Timed Treatment:   45   mins   Total Treatment:     45   mins    Sonia Us, PT  [place] License #: [id number]    Physical Therapist

## 2024-06-03 NOTE — PROGRESS NOTES
Occupational Therapy Daily Treatment Note  Visit: 6  Date of Initial Visit: Type: THERAPY  Noted: 2024      Patient: Claude Masterson   : 1970  Diagnosis/ICD-10 Code:  Decreased coordination [R27.8]  Referring practitioner: Lexie Broderick MD  Date of Initial Visit: Type: THERAPY  Noted: 2024  Today's Date: 6/3/2024  Patient seen for 6 sessions      Subjective:   Patient reports: no complaints, brought splint he ordered offline to aid in stabilizing thumb  Pain: 0/10  Subjective Questionnaire: n/a  Clinical Progress: improved  Home Program Compliance: Yes  Treatment has included: therapeutic exercise and neuromuscular re-education    Subjective   Objective     See flow sheets and activity logs for additional details     OT Neuro          OT Exercises       Row Name 24 0845             Exercise 1    Exercise Name 1 neural priming, BUE ROM, strengthening, and act edenilson building for BUE's for carry over w/ADL and IADL performance  -ST      Equipment 1 UE Ergometer  -ST      Time (Minutes) 1 8  -ST         Exercise 2    Exercise Name 2 examined thumb spica splint with fit L hand and utilized for grasp prehension with varying sized objects for repetitive grasp/release  -ST         Exercise 3    Exercise Name 3 retrieval of objects L hand pincer grasp starting with wted hand med balls and progressing to small items  -ST         Exercise 4    Exercise Name 4 retrieval of wooden dowels L hand pincer grasp with placement distally  -ST                User Key  (r) = Recorded By, (t) = Taken By, (c) = Cosigned By      Initials Name Provider Type    Jodie Payne OTR Occupational Therapist                     Assessment & Plan       Assessment  Impairments: abnormal coordination, abnormal gait, abnormal muscle firing, abnormal muscle tone, abnormal or restricted ROM, activity intolerance, impaired balance, impaired physical strength, lacks appropriate home exercise program and safety issue   Functional  limitations: carrying objects, lifting, walking, pulling, pushing, uncomfortable because of pain, moving in bed, standing, stooping, reaching behind back, reaching overhead and unable to perform repetitive tasks   Assessment details: Pt demonstrates improvements with L hand ataxia with use of thumb spica splint w/varying sized and shaped objects. Pt able to retrieve items w/ minimal dropping of items compared to previous sessions. Suggested pt utilize splint for fxnl carry over with tasks at home to see if ataxia improves.    Pt to benefit from continued skilled OT services to address deficits and promote increased independence, safety and engagement in daily tasks in order to promote improved pt satisfaction and overall QOL. This will be accomplished through methods of ADL/IADL retraining, NMR techniques, sensory re-ed, ROM/stretching, strengthening, WB'ing, AE/DME needs, home/activity/environmental modifications, balance training, among other needs as they arise.     Prognosis: fair    Goals  Plan Goals:   Pt will increase L  strength by 5 or more # by 12 wks to demonstrate improved strength and function for daily tasks. PROGRESSING    Pt will improve L handed score on the Quick Dash by 20 or more points to demonstrate increased engagement and independence with ADL/IADL tasks by 12 wks. MET    Pt will be independent with in-hand manipulation, translation and targeting skill related HEPs for improving accuracy and speed with self feeding including spearing, scooping and cutting/chopping food for home/community environments by 6 wks. PROGRESSING    Pt will be independent with L handed targeting HEPs to increase accuracy with distal reaching and placing of items for carry over with tool use while being timed to increase pressure with 75% accuracy in 5/5 trials by 12 wks. PARTIALLY MET    Pt will be independent with R HW HEPs to increase pt satisfaction with legibility for carry over with filling out medical forms  and check ledger by 12 wks by using appropriate modifications. ONGOING       Plan  Planned therapy interventions: ADL retraining, balance/weight-bearing training, fine motor coordination training, flexibility, functional ROM exercises, home exercise program, motor coordination training, neuromuscular re-education, postural training, strengthening, stretching, therapeutic activities, transfer training, abdominal trunk stabilization, gait training and IADL retraining  Treatment plan discussed with: patient  Plan details: Continue w/OT POC and goals to increase independence, safety and engagement in daily tasks.         Visit Diagnoses:    ICD-10-CM ICD-9-CM   1. Decreased coordination  R27.8 781.3   2. 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 Tech      0     mins 51717;  Ultrasound:     0     mins  05463;    Electrical Stimulation:    0     mins  16157 ( );    Untimed:  Electrical Stimulation:    0     mins  12479 ( );    Timed Treatment:   40   mins   Total Treatment:     40   mins    OT Signature: Jodie Montero MS, OTR/L, CSRS  [place] License #: [id number]

## 2024-06-10 ENCOUNTER — TREATMENT (OUTPATIENT)
Dept: PHYSICAL THERAPY | Facility: CLINIC | Age: 54
End: 2024-06-10
Payer: COMMERCIAL

## 2024-06-10 DIAGNOSIS — R26.89 BALANCE PROBLEM: Primary | ICD-10-CM

## 2024-06-10 DIAGNOSIS — R27.8 DECREASED COORDINATION: Primary | ICD-10-CM

## 2024-06-10 DIAGNOSIS — I69.393 ATAXIA DUE TO OLD CEREBRAL INFARCTION: ICD-10-CM

## 2024-06-10 DIAGNOSIS — R27.0 ATAXIA: ICD-10-CM

## 2024-06-10 PROCEDURE — 97112 NEUROMUSCULAR REEDUCATION: CPT | Performed by: PHYSICAL THERAPIST

## 2024-06-10 PROCEDURE — 97116 GAIT TRAINING THERAPY: CPT | Performed by: PHYSICAL THERAPIST

## 2024-06-10 PROCEDURE — 97110 THERAPEUTIC EXERCISES: CPT | Performed by: PHYSICAL THERAPIST

## 2024-06-10 NOTE — PROGRESS NOTES
Physical Therapy Daily Note  Visit: 6  Date of Initial Visit: Type: THERAPY  Noted: 2024    Patient: Claude Masterson   : 1970  Diagnosis/ICD-10 Code:  Balance problem [R26.89]  Referring practitioner: Lexie Broderick MD  Date of Initial Visit: Type: THERAPY  Noted: 2024  Today's Date: 6/10/2024  Patient seen for 6 sessions      Subjective:   Patient reports: Patient stated he has been doing well with exercises at home, and wants a theraband to use for the rest of his exercise. He states he only notices balance challenges during yard piddling.  Pain: 0  Home Program Compliance: Yes  Treatment has included: therapeutic exercise, neuromuscular re-education, and gait training    Objective   See Exercise, Manual, and Modality Logs for complete treatment.    Exercise 1  Exercise Name 1: (P) Ellitpical B UE/LEs  Equipment/Resistance 1: (P) level 8  Time: (P) 2 min fwd; 1 min bwd; 8 min total  Exercise 2  Exercise Name 2: (P) 1/2 kneeling stance on ea LE: EO c arm swings, EC c head turns L/R/up/down, static holds c arms crossed  Equipment/Resistance 2: (P) CGA<>SBA. floor mat  Sets/Reps 2: (P) 10 ea direction  Exercise 3  Exercise Name 3: (P) Quadruped arm raises, leg raises, bird dogs  Equipment/Resistance 3: (P) floor mat, VC for full hip extension and sequencing  Sets/Reps 3: (P) 5 ea, CGA  Exercise 4  Exercise Name 4: (P) Modified Chinese get ups (to tall kneeling, able to do full get up on last rep)  Equipment/Resistance 4: (P) #2 wt, floor mat, SBA  Exercise 5  Exercise Name 5: (P) Treadmill walking at 2.0-2.2 mph and BUE or uniltaeral UE support (trials w/out UE support but increased lateral sway and poor LLE motor patterns)  Equipment/Resistance 5: (P) treadmill, gaitbelt for CGA  Sets/Reps 5: (P) 6 minutes  Exercise 6  Exercise Name 6: (P) HEP upgraded: eccentric flexion in standing, bridges  Sets/Reps 6: (P) 2 x 10 at home  Exercise 6 Completed: (P) Defer  Exercise 6 Home Program: (P) Yes              PT Neuro          Assessment & Plan       Assessment  Assessment details: Session focused on dynamic balance with integrated core strengthening and LLE motor planning/control during gait. Patient has decreased functional L hip range where he demo's minimal pelvic rollover in stance and some sustained hip/trunk flexion during treadmill training. Noted poor L knee flexor control that contributes to L medial heel whip during swing phase. Next session plan to integrate posterior chain strengthening and motor control. Continue skilled PT services to achieve highest level of functioning.    Plan  Plan details: Pt to benefit from skilled PT services to improve gait, balance, strength, and overall functional mobility.         Timed:  Manual Therapy:            0     mins  84732;  Therapeutic Exercise:    15    mins  70496;     Neuromuscular Hitesh:    33    mins  48147;    Therapeutic Activity:      0     mins  79804;     Gait Trainin    mins  24338;     Electrical Stimulation:    0    mins  69915 ( );     Untimed:  Canalith Repositioning techniques _[id number]_ [id number]      Timed Treatment:   60   mins   Total Treatment:     60   mins      Sonia Us PT  [place] License #: [id number]  Physical Therapist   Sophie Waite, Physical Therapist Student completed treatment under my direct supervision.     06/10/2024

## 2024-06-10 NOTE — PROGRESS NOTES
Occupational Therapy Daily Treatment Note  Visit: 7  Date of Initial Visit: Type: THERAPY  Noted: 2024      Patient: Claude Masterson   : 1970  Diagnosis/ICD-10 Code:  Decreased coordination [R27.8]  Referring practitioner: Lexie Broderick MD  Date of Initial Visit: Type: THERAPY  Noted: 2024  Today's Date: 6/10/2024  Patient seen for 7 sessions      Subjective:   Patient reports: no complaints  Pain: 0/10  Subjective Questionnaire: n/a  Clinical Progress: improved  Home Program Compliance: Yes  Treatment has included: therapeutic exercise, neuromuscular re-education, and therapeutic activity    Subjective   Objective     See flow sheets and activity logs for additional details     OT Neuro              Assessment & Plan       Assessment  Impairments: abnormal coordination, abnormal gait, abnormal muscle firing, abnormal muscle tone, abnormal or restricted ROM, activity intolerance, impaired balance, impaired physical strength, lacks appropriate home exercise program and safety issue   Functional limitations: carrying objects, lifting, walking, pulling, pushing, uncomfortable because of pain, moving in bed, standing, stooping, reaching behind back, reaching overhead and unable to perform repetitive tasks   Assessment details: Continued used of elbow extension for stabilization of LUE for distal FMC tasks which demonstrated most success in pt's precision. Attempted similar jt/muscle stabilization in CMC jt but did not demonstrate the same result.   Pt to benefit from continued skilled OT services to address deficits and promote increased independence, safety and engagement in daily tasks in order to promote improved pt satisfaction and overall QOL. This will be accomplished through methods of ADL/IADL retraining, NMR techniques, sensory re-ed, ROM/stretching, strengthening, WB'ing, AE/DME needs, home/activity/environmental modifications, balance training, among other needs as they arise.     Prognosis:  fair    Goals  Plan Goals:   Pt will increase L  strength by 5 or more # by 12 wks to demonstrate improved strength and function for daily tasks. PROGRESSING    Pt will improve L handed score on the Quick Dash by 20 or more points to demonstrate increased engagement and independence with ADL/IADL tasks by 12 wks. MET    Pt will be independent with in-hand manipulation, translation and targeting skill related HEPs for improving accuracy and speed with self feeding including spearing, scooping and cutting/chopping food for home/community environments by 6 wks. PROGRESSING    Pt will be independent with L handed targeting HEPs to increase accuracy with distal reaching and placing of items for carry over with tool use while being timed to increase pressure with 75% accuracy in 5/5 trials by 12 wks. PARTIALLY MET    Pt will be independent with R HW HEPs to increase pt satisfaction with legibility for carry over with filling out medical forms and check ledger by 12 wks by using appropriate modifications. ONGOING       Plan  Planned therapy interventions: ADL retraining, balance/weight-bearing training, fine motor coordination training, flexibility, functional ROM exercises, home exercise program, motor coordination training, neuromuscular re-education, postural training, strengthening, stretching, therapeutic activities, transfer training, abdominal trunk stabilization, gait training and IADL retraining  Treatment plan discussed with: patient  Plan details: Continue w/OT POC and goals to increase independence, safety and engagement in daily tasks.         Visit Diagnoses:    ICD-10-CM ICD-9-CM   1. Decreased coordination  R27.8 781.3   2. 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 Tech      0     mins 25993;  Ultrasound:     0     mins  29694;    Electrical Stimulation:    0     mins  05774 ( );    Untimed:  Electrical Stimulation:    0     mins  82415 ( );    Timed Treatment:   53   mins   Total Treatment:     53   mins    OT Signature: Jodie Montero MS, OTR/L, CSRS  [place] License #: [id number]

## 2024-06-21 ENCOUNTER — TREATMENT (OUTPATIENT)
Dept: PHYSICAL THERAPY | Facility: CLINIC | Age: 54
End: 2024-06-21
Payer: COMMERCIAL

## 2024-06-21 ENCOUNTER — TELEPHONE (OUTPATIENT)
Dept: FAMILY MEDICINE CLINIC | Facility: CLINIC | Age: 54
End: 2024-06-21
Payer: COMMERCIAL

## 2024-06-21 DIAGNOSIS — R27.8 DECREASED COORDINATION: Primary | ICD-10-CM

## 2024-06-21 DIAGNOSIS — R26.89 BALANCE PROBLEM: Primary | ICD-10-CM

## 2024-06-21 DIAGNOSIS — R27.0 ATAXIA: ICD-10-CM

## 2024-06-21 DIAGNOSIS — I69.393 ATAXIA DUE TO OLD CEREBRAL INFARCTION: ICD-10-CM

## 2024-06-21 PROCEDURE — 97530 THERAPEUTIC ACTIVITIES: CPT | Performed by: OCCUPATIONAL THERAPIST

## 2024-06-21 PROCEDURE — 97110 THERAPEUTIC EXERCISES: CPT | Performed by: OCCUPATIONAL THERAPIST

## 2024-06-21 PROCEDURE — 97112 NEUROMUSCULAR REEDUCATION: CPT | Performed by: OCCUPATIONAL THERAPIST

## 2024-06-21 NOTE — PROGRESS NOTES
PT Progress Note  Visit: 7  Date of Initial Visit: Type: THERAPY  Noted: 2024  [facility]  [address]    Patient: Claude Masterson   : 1970  Diagnosis/ICD-10 Code:  Balance problem [R26.89]  Referring practitioner: Lexie Broderick MD  Date of Initial Visit: Type: THERAPY  Noted: 2024  Today's Date: 2024  Patient seen for 7 sessions    Subjective:   Patient reports: Patient reports he has has some spasms/ataxia moments this week but no pain.  Pain: 0  Clinical Progress: improved  Home Program Compliance: Yes  Treatment has included: therapeutic exercise, neuromuscular re-education, and gait training      10 M: reg 9.5 sec, 7.48 sec fast  FGA:   KOCH/56      Objective   See Exercise, Manual, and Modality Logs for complete treatment.    Exercise 1  Exercise Name 1: (P) Elliptical BUE/BLE  Equipment/Resistance 1: (P) level 10  Time: (P) 2 min fwd; 1 min bwd; 8 min total  Exercise 2  Exercise Name 2: (P) Reassessment, see functional testing  Exercise 3  Exercise Name 3: (P) Gait including: backward walking EC/EO, forward tandem on ground and on beam in // bars, finger/nose walking  Equipment/Resistance 3: (P) #2lb ankle weights on for proprioception, gait belt (CGA many LOB d/t ataxia, patient able to recover % of the time), black beam in // bars  Sets/Reps 3: (P) >500' in gym, extra time working on foot placement d/t ataxia. Minimal-moderate improvements with tandem walking with ankle weights donned but this faded when fatigued.  Time 3: (P) ed with patient to perform this in hallway so he has UE support, uma during EC gait.  Exercise 3 Completed: (P) Yes  Exercise 3 Home Program: (P) Yes          PT Neuro         Assessment & Plan       Assessment  Impairments: abnormal coordination, abnormal gait, activity intolerance, impaired balance, impaired physical strength and lacks appropriate home exercise program   Functional limitations: carrying  objects, lifting, walking, pulling, pushing and standing   Assessment details: Pt met STG and LTG's for the KOCH. Pt required only CGA during dynamic gait training, (with bilateral #2 lb ankle weights donned), with multiple small LOB but he was able to self correct 100% of the time. Pt continues to demonstrate L LE and UE ataxia; HEP upgraded to include dynamic gait to simulate community challenges. 1 new LTG added to ensure community safety at discharge. Pt to benefit from skilled PT services to improve balance and overall functional mobility and to meet goals.  Prognosis: good    Goals  Plan Goals: STG (4 visits)  1. Patient to improve KOCH balance score to >/= 50 /56 to decrease client's risk of falls. MET  2. Patient to perform TUG within 9 sec without LOB for improved functional mobility. MET  3. Patient to ambulate 10 meters without AD within 9 sec without LOB for improved gait nishant and functional mobility. MET  4. Patient to improve FGA score to >/= 26 /30 to decrease client's risk of falls. MET    LTG (8 visits)  1. Patient to improve KOCH balance score to >/= 54 /56 to decrease client's risk of falls. MET  2. Patient to perform TUG within 8 sec without LOB for improved functional mobility. MET  3. Patient to ambulate 10 meters without AD within 8 sec without LOB for improved gait nishant and functional mobility. MET  4. Patient to improve FGA score to >/= 28 /30 to decrease client's risk of falls. ONGOING  5. Patient to be I with HEP. ONGOING    NEW GOALS  LTG  6. Patient to navigate gait outdoors including curbs, uneven surfaces, and inclines/declines with SBA, to improve independence within the home. NEW    Plan  Therapy options: will be seen for skilled therapy services  Planned modality interventions: TENS  Planned therapy interventions: fine motor coordination training, gait training, functional ROM exercises, home exercise program, neuromuscular re-education, strengthening, stretching, abdominal  trunk stabilization and balance/weight-bearing training  Frequency: 1x week  Duration in visits: 4  Treatment plan discussed with: patient  Plan details: Patient will be seen 1x/wk x 4 visits with treatment to include strengthening, stretching, functional e-stim therapy, neuromuscular re-education, balance, gait and endurance training.         Visit Diagnoses:    ICD-10-CM ICD-9-CM   1. Balance problem  R26.89 781.99   2. Ataxia  R27.0 781.3       Progress toward previous goals: Partially Met      Recommendations: Continue as planned  Timeframe:  4 VISITS    PT Signature: Sonia Us, PT  [place] License #: [id number]  Sophie Waite, Physical Therapist Student completed treatment under my direct supervision.     2024        Based upon review of the patient's progress and continued therapy plan, it is my medical opinion that Claude Masterson should continue physical therapy treatment at [facility]  [address] [facility] [id number] [phone].    Signature: __________________________________  Lexie Broderick MD    Timed:  Manual Therapy:    0     mins  12172;  Therapeutic Exercise:    8     mins  58999;     Neuromuscular Hitesh:    37    mins  39419;    Therapeutic Activity:     0     mins  92782;     Gait Trainin     mins  92823;     Electrical Stimulation:    0     mins  76329 ( );    Untimed:  Canalith Repositioning   0 mins  84394    Timed Treatment:   45   mins   Total Treatment:     45   mins

## 2024-06-21 NOTE — TELEPHONE ENCOUNTER
Caller: Claude Masterson    Relationship: Self    Best call back number: [phone]     What orders are you requesting (i.e. lab or imaging): TESTING FOR MRSA     In what timeframe would the patient need to come in: AS SOON AS POSSIBLE     Where will you receive your lab/imaging services: IN OFFICE     Additional notes: PATIENT STATES THERE WAS A MESSAGE FROM THE SURGEON THAT PREFORMED SURGERY ON HIS FOOT THAT STATED HE NEEDS TO GET TESTED FOR MRSA.

## 2024-06-21 NOTE — PROGRESS NOTES
"OT Progress Note        [facility]                                              [address]      Patient: Claude Masterson   : 1970  Diagnosis/ICD-10 Code:  Decreased coordination [R27.8]  Referring practitioner: Lexie Broderick MD  Date of Initial Visit: Type: THERAPY  Noted: 2024  Today's Date: 2024  Patient seen for 8 sessions    Subjective:   Patient reports: \"increasing activities with L\"   Pain: 0/10  Subjective Questionnaire: QuickDASH 25 LUE  Clinical Progress: improved  Home Program Compliance: Yes  Treatment has included: therapeutic exercise, neuromuscular re-education, and therapeutic activity    See flow sheets and activity logs for additional details     Subjective   Objective     OT Neuro         Assessment & Plan       Assessment  Impairments: abnormal coordination, abnormal gait, abnormal muscle firing, abnormal muscle tone, abnormal or restricted ROM, activity intolerance, impaired balance, impaired physical strength, lacks appropriate home exercise program and safety issue   Functional limitations: carrying objects, lifting, walking, pulling, pushing, uncomfortable because of pain, moving in bed, standing, stooping, reaching behind back, reaching overhead and unable to perform repetitive tasks   Assessment details: OT re-assessment per POC. Pt has improved Quick Dash score with LUE and met this goal as he is actively using this hand and arm more in daily tasks along with IADLs, such as using drills, detailing cars, etc. He is utilizing stabilizing techniques through UE proximally to control distal movements. Pt is exhibiting better control of pincer grasp and less loss of . Pt with difficulty initiating intrinsic muscles of L hand however with reps of using foam block this did improve.   Pt to benefit from continued skilled OT services to address deficits and promote increased independence, safety and engagement in daily tasks in order to " promote improved pt satisfaction and overall QOL. This will be accomplished through methods of ADL/IADL retraining, NMR techniques, sensory re-ed, ROM/stretching, strengthening, WB'ing, AE/DME needs, home/activity/environmental modifications, balance training, among other needs as they arise.     Prognosis: fair    Goals  Plan Goals:   Pt will increase L  strength by 5 or more # by 12 wks to demonstrate improved strength and function for daily tasks. MET    Pt will improve L handed score on the Quick Dash by 20 or more points to demonstrate increased engagement and independence with ADL/IADL tasks by 12 wks. MET    Pt will be independent with in-hand manipulation, translation and targeting skill related HEPs for improving accuracy and speed with self feeding including spearing, scooping and cutting/chopping food for home/community environments by 6 wks. PROGRESSING    Pt will be independent with L handed targeting HEPs to increase accuracy with distal reaching and placing of items for carry over with tool use while being timed to increase pressure with 75% accuracy in 5/5 trials by 12 wks. PARTIALLY MET    Pt will be independent with R HW HEPs to increase pt satisfaction with legibility for carry over with filling out medical forms and check ledger by 12 wks by using appropriate modifications. ONGOING       Plan  Planned therapy interventions: ADL retraining, balance/weight-bearing training, fine motor coordination training, flexibility, functional ROM exercises, home exercise program, motor coordination training, neuromuscular re-education, postural training, strengthening, stretching, therapeutic activities, transfer training, abdominal trunk stabilization, gait training and IADL retraining  Frequency: 1x week  Treatment plan discussed with: patient  Plan details: Continue w/OT POC and goals to increase independence, safety and engagement in daily tasks.         Visit Diagnoses:    ICD-10-CM ICD-9-CM   1.  Decreased coordination  R27.8 781.3   2. 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    Signature: __________________________________  Lexie Broderick MD    Timed:  Manual Therapy:    0     mins  83578;  Therapeutic Exercise:    8     mins  76802;     Neuromuscular Hitesh:    20    mins  50268;    Therapeutic Activity:     17     mins  72275;     Self-Care/ADL     0     mins  92268;   Sensory Int. Tech      0     mins 39977;  Ultrasound:     0     mins  81648;    Electrical Stimulation:    0     mins  50390 ( );    Untimed:  Electrical Stimulation:    0     mins  74302 ( );    Timed Treatment:   45   mins   Total Treatment:     45   mins

## 2024-06-24 ENCOUNTER — OFFICE VISIT (OUTPATIENT)
Dept: FAMILY MEDICINE CLINIC | Facility: CLINIC | Age: 54
End: 2024-06-24
Payer: COMMERCIAL

## 2024-06-24 VITALS
HEART RATE: 73 BPM | BODY MASS INDEX: 24.35 KG/M2 | OXYGEN SATURATION: 97 % | WEIGHT: 179.8 LBS | HEIGHT: 72 IN | SYSTOLIC BLOOD PRESSURE: 130 MMHG | DIASTOLIC BLOOD PRESSURE: 84 MMHG

## 2024-06-24 DIAGNOSIS — M20.42 HAMMERTOE OF LEFT FOOT: Primary | ICD-10-CM

## 2024-06-24 PROCEDURE — 1126F AMNT PAIN NOTED NONE PRSNT: CPT | Performed by: PHYSICIAN ASSISTANT

## 2024-06-24 PROCEDURE — 3075F SYST BP GE 130 - 139MM HG: CPT | Performed by: PHYSICIAN ASSISTANT

## 2024-06-24 PROCEDURE — 99213 OFFICE O/P EST LOW 20 MIN: CPT | Performed by: PHYSICIAN ASSISTANT

## 2024-06-24 PROCEDURE — 3079F DIAST BP 80-89 MM HG: CPT | Performed by: PHYSICIAN ASSISTANT

## 2024-06-24 NOTE — PROGRESS NOTES
"    Chief Complaint   Patient presents with    Foot Injury     [place] surgeon requesting  testing for MRSA  (Surgery over a year ago)       HPI     Claude Msaterson is a 53 y.o. male with a history of CVA, hypertension, chronic low back pain, depression, and glaucoma who presents for evaluation of \"chief complaint.\"     He reports he recently received a message from his orthopedic surgeon at the [facility] recommended he be tested for MRSA. He had surgery on his right foot in November or December but denies any complications or infection. He denies current skin or respiratory complaints.     Past Medical History:   Diagnosis Date    Anxiety     Anxiety and depression     Arthritis     Cerebrovascular disease     DDD (degenerative disc disease), cervical     Depression     Erectile dysfunction     Glaucoma [id number]    Hormone disorder     Hyperlipidemia     Hypertension     Injury of back     Periarthritis of shoulder     Peripheral neuropathy 8/27)2017    Stroke    Rotator cuff syndrome     Stroke        Past Surgical History:   Procedure Laterality Date    BACK SURGERY  2000    L5-S1    BACK SURGERY  2009    L5-S1 replace hardware    HERNIA REPAIR         Family History   Problem Relation Age of Onset    Hypertension Mother     Hypertension Father        Social History     Socioeconomic History    Marital status: Single   Tobacco Use    Smoking status: Never     Passive exposure: Never    Smokeless tobacco: Never   Vaping Use    Vaping status: Never Used   Substance and Sexual Activity    Alcohol use: No    Drug use: No    Sexual activity: Yes     Partners: Female     Birth control/protection: None       Allergies   Allergen Reactions    Penicillins Hives    Sulfa Antibiotics Hives     Unsure if it was a specific sulfa drug or all sulfa drugs    Hydrocodone-Acetaminophen Rash       ROS    Review of Systems   Constitutional:  Negative for chills and fever.   Respiratory:  Negative for cough, shortness of " breath and wheezing.    Cardiovascular:  Negative for chest pain.   Skin:  Negative for rash and skin lesions.       Vitals:    06/24/24 1332   BP: 130/84   Pulse: 73   SpO2: 97%     Body mass index is 24.46 kg/m².      Current Outpatient Medications:     amantadine (SYMMETREL) 100 MG tablet, , Disp: , Rfl:     amLODIPine (NORVASC) 10 MG tablet, Take 1 tablet by mouth Daily., Disp: , Rfl:     atorvastatin (LIPITOR) 20 MG tablet, TAKE 1 TABLET BY MOUTH DAILY, Disp: 90 tablet, Rfl: 2    clopidogrel (PLAVIX) 75 MG tablet, Take 1 tablet by mouth Daily., Disp: , Rfl:     Diclofenac Sodium (VOLTAREN) 1 % gel gel, APPLY 2 GRAMS TOPICALLY TO THE AFFECTED AREA 2 TO 3 TIMES DAILY AS NEEDED FOR PAIN, Disp: , Rfl:     famotidine (PEPCID) 20 MG tablet, Take 1 tablet by mouth Daily., Disp: , Rfl:     gabapentin (NEURONTIN) 400 MG capsule, Take 1 capsule 3 times a day by oral route., Disp: , Rfl:     hydrOXYzine (ATARAX) 25 MG tablet, Take 1 tablet by mouth 2 (Two) Times a Day., Disp: , Rfl:     nortriptyline (PAMELOR) 25 MG capsule, Take 2 capsules by mouth Every Night., Disp: 60 capsule, Rfl: 11    oxyCODONE-acetaminophen (PERCOCET)  MG per tablet, , Disp: , Rfl:     tadalafil (Cialis) 10 MG tablet, Take 1 tablet by mouth As Needed for Erectile Dysfunction., Disp: 20 tablet, Rfl: 1    tamsulosin (FLOMAX) 0.4 MG capsule 24 hr capsule, TAKE 1 CAPSULE BY MOUTH DAILY, Disp: 30 capsule, Rfl: 1    timolol (TIMOPTIC) 0.5 % ophthalmic solution, Administer 1 drop to both eyes 2 (Two) Times a Day., Disp: , Rfl:     PE    Physical Exam  Vitals reviewed.   Constitutional:       General: He is not in acute distress.  Pulmonary:      Effort: Pulmonary effort is normal. No respiratory distress.   Neurological:      Mental Status: He is alert.   Psychiatric:         Mood and Affect: Mood normal.          A/P    Problem List Items Addressed This Visit    None  Visit Diagnoses       Hammertoe of left foot    -  Primary          -Unable to  see any messages in Epic about MRSA testing, and the patient unfortunately is unable to access his patient portal for us to review his message. We discussed this was most likely recommended preoperative testing for his foot surgery, however, I cannot explain why he just would have received it 7-8 months after his surgery. He denies any signs or symptoms of infection today. I can see office notes from his surgeon from May 2023 and prior but none from November or December last year. The patient was encouraged to contact his orthopedic surgeon's office about this. He tells me his previous PCP was able to access these messages electronically and that he will reach out to his previous PCP.       Plan of care was reviewed with patient at the conclusion of today's visit. Education was provided regarding diagnoses, management, prescribed or recommended OTC products, and the importance of compliance with follow-up appointments. The patient was counseled regarding the risks, benefits, and possible side-effects of treatment. I advised the patient to keep me informed of any acute changes in their status including new, worsening, or persistent symptoms. Patient expresses understanding and agreement with the management plan.        Mahendra Muhammad PA-C

## 2024-06-24 NOTE — TELEPHONE ENCOUNTER
Left message for Claude Masterson  to return my call.    Hub may relay message and document    Patient will need an appointment to discuss with PCP

## 2024-06-24 NOTE — TELEPHONE ENCOUNTER
Name: Claude Masterson    Relationship: Self    Best Callback Number: [phone]    HUB PROVIDED THE RELAY MESSAGE FROM THE OFFICE   PATIENT VOICED UNDERSTANDING AND HAS NO FURTHER QUESTIONS AT THIS TIME    ADDITIONAL INFORMATION:

## 2024-06-27 ENCOUNTER — TREATMENT (OUTPATIENT)
Dept: PHYSICAL THERAPY | Facility: CLINIC | Age: 54
End: 2024-06-27
Payer: COMMERCIAL

## 2024-06-27 DIAGNOSIS — R27.0 ATAXIA: ICD-10-CM

## 2024-06-27 DIAGNOSIS — R27.8 DECREASED COORDINATION: Primary | ICD-10-CM

## 2024-06-27 DIAGNOSIS — I69.393 ATAXIA DUE TO OLD CEREBRAL INFARCTION: ICD-10-CM

## 2024-06-27 DIAGNOSIS — R26.89 BALANCE PROBLEM: Primary | ICD-10-CM

## 2024-06-27 NOTE — PROGRESS NOTES
Physical Therapy Daily Note  Visit: 8  Date of Initial Visit: Type: THERAPY  Noted: 2024    Patient: Claude Masterson   : 1970  Diagnosis/ICD-10 Code:  Balance problem [R26.89]  Referring practitioner: Lexie Broderick MD  Date of Initial Visit: Type: THERAPY  Noted: 2024  Today's Date: 2024  Patient seen for 8 sessions      Subjective:   Patient reports: reports his back is stiff today.  Pain: 0      Objective   See Exercise, Manual, and Modality Logs for complete treatment.    Exercise 1  Exercise Name 1: Elliptical BUE/BLE  Equipment/Resistance 1: level 10  Time: 2 min fwd; 1 min bwd; 8 min total  Exercise 2  Exercise Name 2: 90/90 turns from blue foam, stepping over ivan, to two air discs. in // bars  Equipment/Resistance 2: foam, dysom under two blue discs, orange ivan, gait belt CGA/Reshma  Exercise 3  Exercise Name 3: Treadmill walking at 2.2 mph progressing to 2.4 mph, working on LLE not crossing midline and this corrected eccentric control during terminal swing  Equipment/Resistance 3: 2.4 mph, 10 mins  Sets/Reps 3: patient instructed to put tape on midline of treadmill at home  Exercise 4  Exercise Name 4: rocker board forward/backward/center static with trials EO/EC, lateral stance EO/EC. 1 finger touching // bars with EC  Equipment/Resistance 4: rocker board, // bars, gait belt CGA/Reshma  Time 4: ~5 mins         PT Neuro          Assessment & Plan       Assessment  Assessment details: Session focused on dynamic/static balance on uneven surfaces and gait training. Patient demo'd improved LLE eccentric control when attending to LLE not crossing midline; he will put tape down midline of treadmill at home. Continue skilled PT services to achieve highest level of functioning.    Plan  Plan details: Pt to benefit from skilled PT services to improve gait, balance, strength, and overall functional mobility.         Timed:  Manual Therapy:            0     mins  82105;  Therapeutic Exercise:     8    mins  96218;     Neuromuscular Hitesh:    24    mins  79682;    Therapeutic Activity:      0     mins  94743;     Gait Training:                 10    mins  90246;     Electrical Stimulation:    0    mins  56535 ( );     Untimed:  Canalith Repositioning techniques _[id number]      Timed Treatment:   42   mins   Total Treatment:     42   mins      JULIÁN De La O License #: [id number]  Physical Therapist   Sophie Waite Physical Therapist Student completed treatment under my direct supervision.     06/27/2024

## 2024-06-27 NOTE — PROGRESS NOTES
Occupational Therapy Daily Treatment Note  Visit: 9  Date of Initial Visit: Type: THERAPY  Noted: 2024      Patient: Claude Masterson   : 1970  Diagnosis/ICD-10 Code:  Decreased coordination [R27.8]  Referring practitioner: Lexie Broderick MD  Date of Initial Visit: Type: THERAPY  Noted: 2024  Today's Date: 2024  Patient seen for 9 sessions      Subjective:   Patient reports: no complaints  Pain: 0/0  Subjective Questionnaire: n/a  Clinical Progress: improved  Home Program Compliance: Yes  Treatment has included: neuromuscular re-education and therapeutic activity    Subjective   Objective     See flow sheets and activity logs for additional details     OT Neuro          OT Exercises       Row Name 24 0845             Exercise 1    Exercise Name 1 neural priming, BUE ROM, strengthening, and act edenilson building for BUE's for carry over w/ADL and IADL performance  -ST      Equipment 1 UE Ergometer  -ST      Time (Minutes) 1 8  -ST         Exercise 2    Exercise Name 2 KTaping L thumb extension and abduction for carry over with funcitonal grasp prehension  -ST         Exercise 3    Exercise Name 3 in-hand manipulation of objects to rotate and place L hand  -ST         Exercise 4    Exercise Name 4 pincer grasp L hand retrieval and placement of objects with stacking  -ST                User Key  (r) = Recorded By, (t) = Taken By, (c) = Cosigned By      Initials Name Provider Type    Jodie Payne OTR Occupational Therapist                     Assessment & Plan       Assessment  Impairments: abnormal coordination, abnormal gait, abnormal muscle firing, abnormal muscle tone, abnormal or restricted ROM, activity intolerance, impaired balance, impaired physical strength, lacks appropriate home exercise program and safety issue   Functional limitations: carrying objects, lifting, walking, pulling, pushing, uncomfortable because of pain, moving in bed, standing, stooping, reaching behind back,  reaching overhead and unable to perform repetitive tasks   Assessment details: Pt exhibits greater control today with ability to maintain grasp prehension alt with pincer, lateral and power grasp. Pt continues to have most ataxia with thumb that contributes to dropping of items. Pt requires significant time and effort for in-hand manipulation and translation d/t ataxia and impaired mobility of digits.   OT re-assessment per POC. Pt has improved Quick Dash score with LUE and met this goal as he is actively using this hand and arm more in daily tasks along with IADLs, such as using drills, detailing cars, etc. He is utilizing stabilizing techniques through UE proximally to control distal movements. Pt is exhibiting better control of pincer grasp and less loss of . Pt with difficulty initiating intrinsic muscles of L hand however with reps of using foam block this did improve.   Pt to benefit from continued skilled OT services to address deficits and promote increased independence, safety and engagement in daily tasks in order to promote improved pt satisfaction and overall QOL. This will be accomplished through methods of ADL/IADL retraining, NMR techniques, sensory re-ed, ROM/stretching, strengthening, WB'ing, AE/DME needs, home/activity/environmental modifications, balance training, among other needs as they arise.     Prognosis: fair    Goals  Plan Goals:   Pt will increase L  strength by 5 or more # by 12 wks to demonstrate improved strength and function for daily tasks. MET    Pt will improve L handed score on the Quick Dash by 20 or more points to demonstrate increased engagement and independence with ADL/IADL tasks by 12 wks. MET    Pt will be independent with in-hand manipulation, translation and targeting skill related HEPs for improving accuracy and speed with self feeding including spearing, scooping and cutting/chopping food for home/community environments by 6 wks. PROGRESSING    Pt will be  independent with L handed targeting HEPs to increase accuracy with distal reaching and placing of items for carry over with tool use while being timed to increase pressure with 75% accuracy in 5/5 trials by 12 wks. PARTIALLY MET    Pt will be independent with R HW HEPs to increase pt satisfaction with legibility for carry over with filling out medical forms and check ledger by 12 wks by using appropriate modifications. ONGOING       Plan  Planned therapy interventions: ADL retraining, balance/weight-bearing training, fine motor coordination training, flexibility, functional ROM exercises, home exercise program, motor coordination training, neuromuscular re-education, postural training, strengthening, stretching, therapeutic activities, transfer training, abdominal trunk stabilization, gait training and IADL retraining  Frequency: 1x week  Treatment plan discussed with: patient  Plan details: Continue w/OT POC and goals to increase independence, safety and engagement in daily tasks.         Visit Diagnoses:    ICD-10-CM ICD-9-CM   1. Decreased coordination  R27.8 781.3   2. 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 Tech      0     mins 79753;  Ultrasound:     0     mins  88136;    Electrical Stimulation:    0     mins  25500 ( );    Untimed:  Electrical Stimulation:    0     mins  61855 ( );    Timed Treatment:   40   mins   Total Treatment:     40   mins    OT Signature: Jodie Montero MS, OTR/L, CSRS  [place] License #: [id number]

## 2024-07-01 ENCOUNTER — TREATMENT (OUTPATIENT)
Dept: PHYSICAL THERAPY | Facility: CLINIC | Age: 54
End: 2024-07-01
Payer: COMMERCIAL

## 2024-07-01 DIAGNOSIS — I69.393 ATAXIA DUE TO OLD CEREBRAL INFARCTION: Primary | ICD-10-CM

## 2024-07-01 DIAGNOSIS — R27.0 ATAXIA: ICD-10-CM

## 2024-07-01 DIAGNOSIS — R27.8 DECREASED COORDINATION: ICD-10-CM

## 2024-07-01 DIAGNOSIS — R26.89 BALANCE PROBLEM: Primary | ICD-10-CM

## 2024-07-01 NOTE — PROGRESS NOTES
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 [I69.393]  Referring practitioner: Lexie Broderick MD  Date of Initial Visit: Type: THERAPY  Noted: 2024  Today's Date: 2024  Patient seen for 10 sessions      Subjective:   Patient reports: no complaints, states he is using his L arm more and more during IADLs  Pain: 0/10  Subjective Questionnaire: n/a  Clinical Progress: improved  Home Program Compliance: Yes  Treatment has included: therapeutic exercise and neuromuscular re-education    Subjective   Objective     See flow sheets and activity logs for additional details     OT Neuro          OT Exercises       Row Name 24 0930             Exercise 1    Exercise Name 1 neural priming, BUE ROM, strengthening, and act edenilson building for BUE's for carry over w/ADL and IADL performance  -ST      Equipment 1 UE Ergometer  -ST      Time (Minutes) 1 8  -ST         Exercise 2    Exercise Name 2 cup to mouth L hand, attempting to control thumb vs digits  -ST         Exercise 3    Exercise Name 3 cup to mouth L hand use of metal/wted cup, achieving greater success  -ST         Exercise 4    Exercise Name 4 tricep extension L arm cable machine  -ST      Weights/Plates 4 20  -ST      Sets 4 2  -ST      Reps 4 10  -ST         Exercise 5    Exercise Name 5 CP standing L arm cable machine  -ST      Weights/Plates 5 20  -ST      Sets 5 2  -ST      Reps 5 15  -ST         Exercise 6    Exercise Name 6 shoulder add across body LUE use of cable machine  -ST      Weights/Plates 6 20  -ST      Sets 6 2  -ST      Reps 6 15  -ST         Exercise 7    Exercise Name 7 shoulder PNF shoulder flexion pattern cable machine  -ST      Weights/Plates 7 20  -ST      Sets 7 2  -ST      Reps 7 10  -ST         Exercise 8    Exercise Name 8 placing target OH L hand  -ST                 User Key  (r) = Recorded By, (t) = Taken By, (c) = Cosigned By      Initials Name Provider Type    Jodie Payne, OTR Occupational Therapist                     Assessment & Plan       Assessment  Impairments: abnormal coordination, abnormal gait, abnormal muscle firing, abnormal muscle tone, abnormal or restricted ROM, activity intolerance, impaired balance, impaired physical strength, lacks appropriate home exercise program and safety issue   Functional limitations: carrying objects, lifting, walking, pulling, pushing, uncomfortable because of pain, moving in bed, standing, stooping, reaching behind back, reaching overhead and unable to perform repetitive tasks   Assessment details:       Pt exhibits greater control today with ability to maintain grasp prehension along with precision of targeting LUE when OH. Pt also with greater control when performing movements distally when able to stabilize shoulder in all directions. Cuing provided to initiate and maintain slight scapular retraction for optimal positioning and control. Pt exhibits best control with L hand with simulated drinking using wted metal cup as he is able to provide as much pressure as needed into the cup.  Pt to benefit from continued skilled OT services to address deficits and promote increased independence, safety and engagement in daily tasks in order to promote improved pt satisfaction and overall QOL. This will be accomplished through methods of ADL/IADL retraining, NMR techniques, sensory re-ed, ROM/stretching, strengthening, WB'ing, AE/DME needs, home/activity/environmental modifications, balance training, among other needs as they arise.     Prognosis: fair    Goals  Plan Goals:   Pt will increase L  strength by 5 or more # by 12 wks to demonstrate improved strength and function for daily tasks. MET    Pt will improve L handed score on the Quick Dash by 20 or more points to demonstrate increased engagement and independence  with ADL/IADL tasks by 12 wks. MET    Pt will be independent with in-hand manipulation, translation and targeting skill related HEPs for improving accuracy and speed with self feeding including spearing, scooping and cutting/chopping food for home/community environments by 6 wks. PROGRESSING    Pt will be independent with L handed targeting HEPs to increase accuracy with distal reaching and placing of items for carry over with tool use while being timed to increase pressure with 75% accuracy in 5/5 trials by 12 wks. PARTIALLY MET    Pt will be independent with R HW HEPs to increase pt satisfaction with legibility for carry over with filling out medical forms and check ledger by 12 wks by using appropriate modifications. ONGOING       Plan  Planned therapy interventions: ADL retraining, balance/weight-bearing training, fine motor coordination training, flexibility, functional ROM exercises, home exercise program, motor coordination training, neuromuscular re-education, postural training, strengthening, stretching, therapeutic activities, transfer training, abdominal trunk stabilization, gait training and IADL retraining  Frequency: 1x week  Treatment plan discussed with: patient  Plan details: Continue w/OT POC and goals to increase independence, safety and engagement in daily tasks.         Visit Diagnoses:    ICD-10-CM ICD-9-CM   1. 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 Decreased coordination  R27.8 781.3             Timed:  Manual Therapy:    0     mins  90704;  Therapeutic Exercise:    30     mins  47306;     Neuromuscular Hitesh:    15    mins  87317;    Therapeutic Activity:     0     mins  79068;     Self-Care/ADL     0     mins  17159;   Sensory Int. Tech      0     mins 83267;  Ultrasound:     0     mins  21456;    Electrical Stimulation:    0     mins  97484 ( );    Untimed:  Electrical Stimulation:    0     mins  78715 ( );    Timed Treatment:   45   mins   Total Treatment:      45   mins    OT Signature: Jodie Montero MS, OTR/L, CSRS  [place] License #: [id number]

## 2024-07-01 NOTE — PROGRESS NOTES
"Physical Therapy Daily Treatment Note  Visit: 9  Date of Initial Visit: Type: THERAPY  Noted: 2024  Balance problem [R26.89]  Claude Masterson reports: \"I've been walking on my treadmill and I put tape in the center and on the side to try and place my left foot where I want to put it. I'm also working on the floor ladder and placing my left foot.\"    Subjective Evaluation    Pain  Current pain ratin         Objective   See Exercise, Manual, and Modality Logs for complete treatment.    Exercise 1  Exercise Name 1: Elliptical BUE/BLE  Equipment/Resistance 1: level 10  Time: 1 min fwd; 1 min bwd; 8 min total  Exercise 2  Exercise Name 2: Single LE stepping up onto rounded side with other foot tapping cone on top of box turned on its side without UE A; standing on both sides of BOSU without UE A with mini-squats  Equipment/Resistance 2: BOSU; mod A  Sets/Reps 2: 12  Exercise 3  Exercise Name 3: Fwd walking along foam beam; hold tandem on beam with LE behind stepping fwd/bwd  Equipment/Resistance 3: blue foam beam; mod A  Sets/Reps 3: 12  Exercise 4  Exercise Name 4: Educated pt about TM use at home and placing tape along center of belt for pt to focus on L LE placement and not crossing midline  Equipment/Resistance 4: TM training         PT Neuro          Assessment & Plan       Assessment  Assessment details: Pt has difficulty with L LE stability with standing balance on both sides of BOSU with LOB up to 40% of the time. Pt to continue with ambulation at home on TM, agility ladder work and strengthening.  Pt verbalizes understanding that ataxia will not fully go away but that his HEP and strengthening can help.  Pt to benefit from skilled PT services to improve overall functional mobility for one final visit.      Plan  Plan details: Pt to benefit from skilled PT services to improve gait, balance, strength, and overall functional mobility.          Manual Therapy:     0     mins  51276;  Therapeutic Exercise:     " 10     mins  27440;     Neuromuscular Hitesh:     30    mins  14659;    Therapeutic Activity:      0     mins  95087;     Gait Trainin     mins  45706;     Ultrasound:      0     mins  26717;    Electrical Stimulation:     0     mins  37515 ( );  Dry Needling      0     mins self-pay  Traction      0     mins 53683  Canalith Repositioning    0     mins 04959      Timed Treatment:   40   mins   Total Treatment:     40   mins    Sonia Us, PT  [place] License #: [id number]    Physical Therapist

## 2024-07-08 ENCOUNTER — TREATMENT (OUTPATIENT)
Dept: PHYSICAL THERAPY | Facility: CLINIC | Age: 54
End: 2024-07-08
Payer: COMMERCIAL

## 2024-07-08 DIAGNOSIS — I69.393 ATAXIA DUE TO OLD CEREBRAL INFARCTION: Primary | ICD-10-CM

## 2024-07-08 DIAGNOSIS — R27.0 ATAXIA: ICD-10-CM

## 2024-07-08 DIAGNOSIS — R26.89 BALANCE PROBLEM: Primary | ICD-10-CM

## 2024-07-08 DIAGNOSIS — R27.8 DECREASED COORDINATION: ICD-10-CM

## 2024-07-08 NOTE — PROGRESS NOTES
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 [I69.393]  Referring practitioner: Lexie Broderick MD  Date of Initial Visit: Type: THERAPY  Noted: 2024  Today's Date: 2024  Patient seen for 11 sessions      Subjective:   Patient reports: no complaints  Pain: 0/10  Subjective Questionnaire: n/a  Clinical Progress: improved  Home Program Compliance: Yes  Treatment has included: therapeutic exercise, therapeutic activity, and self-care/ADL retraining    Subjective   Objective     See flow sheets and activity logs for additional details     OT Neuro        Assessment & Plan       Assessment  Assessment details:       Pt exhibits greater control today with ability to maintain grasp prehension along with precision of targeting LUE when OH. Pt also with greater control when performing movements distally when able to stabilize shoulder in all directions. Cuing provided to initiate and maintain slight scapular retraction for optimal positioning and control. Pt exhibits best control with L hand with simulated drinking using wted metal cup as he is able to provide as much pressure as needed into the cup.  Pt to benefit from continued skilled OT services to address deficits and promote increased independence, safety and engagement in daily tasks in order to promote improved pt satisfaction and overall QOL. This will be accomplished through methods of ADL/IADL retraining, NMR techniques, sensory re-ed, ROM/stretching, strengthening, WB'ing, AE/DME needs, home/activity/environmental modifications, balance training, among other needs as they arise.     Prognosis: fair    Goals  Plan Goals:   Pt will increase L  strength by 5 or more # by 12 wks to demonstrate improved strength and function for daily tasks. MET    Pt will improve L handed score on  the Quick Dash by 20 or more points to demonstrate increased engagement and independence with ADL/IADL tasks by 12 wks. MET    Pt will be independent with in-hand manipulation, translation and targeting skill related HEPs for improving accuracy and speed with self feeding including spearing, scooping and cutting/chopping food for home/community environments by 6 wks. MET    Pt will be independent with L handed targeting HEPs to increase accuracy with distal reaching and placing of items for carry over with tool use while being timed to increase pressure with 75% accuracy in 5/5 trials by 12 wks.  MET    Pt will be independent with R HW HEPs to increase pt satisfaction with legibility for carry over with filling out medical forms and check ledger by 12 wks by using appropriate modifications. PARTIALLY MET       Plan  Treatment plan discussed with: patient  Plan details: D/C to independent management of activities and HEPs        Visit Diagnoses:    ICD-10-CM ICD-9-CM   1. 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 Decreased coordination  R27.8 781.3             Timed:  Manual Therapy:    0     mins  13935;  Therapeutic Exercise:    8     mins  63984;     Neuromuscular Hitesh:    0    mins  03064;    Therapeutic Activity:     10     mins  27249;     Self-Care/ADL     20     mins  93825;   Sensory Int. Tech      0     mins 28253;  Ultrasound:     0     mins  70149;    Electrical Stimulation:    0     mins  75541 ( );    Untimed:  Electrical Stimulation:    0     mins  22612 ( );    Timed Treatment:   38   mins   Total Treatment:     38   mins    OT Signature: Jodie Montero MS, OTR/L, CSRS  [place] License #: [id number]

## 2024-07-08 NOTE — PROGRESS NOTES
"PT Discharge Note        Patient: Claude Masterson   : 1970  Diagnosis/ICD-10 Code:  Balance problem [R26.89]  Referring practitioner: Lexie Broderick MD  Date of Initial Visit: Type: THERAPY  Noted: 2024  Today's Date: 2024  Patient seen for 10 sessions      Subjective:   Claude Masterson reports: \"I feel like I want to try weights on my ankles.\"  Subjective Questionnaire: n/a  Clinical Progress: improved  Home Program Compliance: Yes  Treatment has included: therapeutic exercise, neuromuscular re-education, and gait training    Subjective   Objective     Exercise 1  Exercise Name 1: SciFit B UE/LEs  Equipment/Resistance 1: level 10  Time: 4 min fwd/4 min bwd  Exercise 2  Exercise Name 2: Discharge completed, see for details  Exercise 3  Exercise Name 3: St balance with alternating LE to first step and other stepping to the third step and down without UE A  Equipment/Resistance 3: 3 steps; SBA and VCing for alternating  Sets/Reps 3: 10  Exercise 4  Exercise Name 4: st balance on rocker board R/L and fwd/bwd with UE reaching overhead and across midline with clips  Equipment/Resistance 4: rocker board; clips; tband  Sets/Reps 4: 10  Exercise 5  Exercise Name 5: Ambulation with both 5 and 4# wt on left ankle and pt educated that he can utilize with ambulation to build strength         Functional Testing  Functional Tests Options: FGA (Functional Gait Assessment)    FGA: 26/30      PT Neuro         Assessment & Plan       Assessment  Assessment details: Pt     Goals  Plan Goals: STG (4 visits)  1. Patient to improve KOCH balance score to >/= 50 /56 to decrease client's risk of falls. MET  2. Patient to perform TUG within 9 sec without LOB for improved functional mobility. MET  3. Patient to ambulate 10 meters without AD within 9 sec without LOB for improved gait nishant and functional mobility. MET  4. Patient to improve FGA score to >/= 26 /30 to decrease client's risk of falls. MET    LTG (8 visits)  1. " Patient to improve KOCH balance score to >/= 54 /56 to decrease client's risk of falls. MET  2. Patient to perform TUG within 8 sec without LOB for improved functional mobility. MET  3. Patient to ambulate 10 meters without AD within 8 sec without LOB for improved gait nishant and functional mobility. MET  4. Patient to improve FGA score to >/= 28 /30 to decrease client's risk of falls. PARTIALLY MET  5. Patient to be I with HEP. ONGOING    NEW GOALS  LTG  6. Patient to navigate gait outdoors including curbs, uneven surfaces, and inclines/declines with SBA, to improve independence within the home. MET    Plan  Planned therapy interventions: strengthening and stretching  Plan details: Patient will be discharged from PT services and continue with HEP.      Visit Diagnoses:    ICD-10-CM ICD-9-CM   1. Balance problem  R26.89 781.99   2. Ataxia  R27.0 781.3       Progress toward previous goals: Partially Met      Recommendations: discharge  Timeframe: n/a  Prognosis to achieve goals: n/a    PT Signature: Sonia Us, PT  [place] License #: [id number]    Based upon review of the patient's progress and continued therapy plan, it is my medical opinion that Claude Masterson should be discharged from physical therapy treatment at [facility] PHYSICAL THERAPY  [facility] [address] [facility] [address] [phone].    Signature: __________________________________  Lexie Broderick MD    Timed:  Manual Therapy:    0     mins  73189;  Therapeutic Exercise:    15     mins  14329;     Neuromuscular Hitesh:    20    mins  66088;    Therapeutic Activity:     0     mins  57444;     Gait Training:      10     mins  23732;     Ultrasound:     0     mins  59862;    Electrical Stimulation:    0     mins  66176 ( );    Untimed:  Electrical Stimulation:    0     mins  43796 ( );  Mechanical Traction:    0     mins  19185;     Timed Treatment:   45   mins   Total Treatment:     45   mins

## 2024-07-14 DIAGNOSIS — N52.9 ERECTILE DYSFUNCTION, UNSPECIFIED ERECTILE DYSFUNCTION TYPE: ICD-10-CM

## 2024-07-15 RX ORDER — TADALAFIL 10 MG/1
10 TABLET ORAL AS NEEDED
Qty: 20 TABLET | Refills: 1 | Status: SHIPPED | OUTPATIENT
Start: 2024-07-15

## 2024-07-15 NOTE — TELEPHONE ENCOUNTER
Rx Refill Note  Requested Prescriptions     Pending Prescriptions Disp Refills    tadalafil (CIALIS) 10 MG tablet [Pharmacy Med Name: TADALAFIL 10 MG TABLET] 20 tablet 1     Sig: TAKE 1 TABLET BY MOUTH AS NEEDED FOR ERECTILE DYSFUNCTION      Last office visit with prescribing clinician: 4/4/2024 (Philip Parsons)  Next office visit with prescribing clinician: 10/9/2024 (Shayy Hill)    Julieta Ferreira MA  07/15/24, 08:33 EDT

## 2024-07-16 ENCOUNTER — TELEPHONE (OUTPATIENT)
Dept: UROLOGY | Facility: CLINIC | Age: 54
End: 2024-07-16
Payer: COMMERCIAL

## 2024-07-16 NOTE — TELEPHONE ENCOUNTER
Spoke with Jennifer from [facility] pharmacy. She stated she needed more specifics on dose intake for the tadalafil. Instead of as needed, she needs it to say daily or 2X daily.      Phone: [phone] Fax: [phone]

## 2024-07-23 ENCOUNTER — OFFICE VISIT (OUTPATIENT)
Dept: FAMILY MEDICINE CLINIC | Facility: CLINIC | Age: 54
End: 2024-07-23
Payer: COMMERCIAL

## 2024-07-23 VITALS
WEIGHT: 198.6 LBS | DIASTOLIC BLOOD PRESSURE: 94 MMHG | BODY MASS INDEX: 26.9 KG/M2 | SYSTOLIC BLOOD PRESSURE: 132 MMHG | HEIGHT: 72 IN | HEART RATE: 77 BPM | OXYGEN SATURATION: 97 %

## 2024-07-23 DIAGNOSIS — L23.7 POISON IVY DERMATITIS: Primary | ICD-10-CM

## 2024-07-23 PROBLEM — E78.5 HLD (HYPERLIPIDEMIA): Status: ACTIVE | Noted: 2023-07-10

## 2024-07-23 PROCEDURE — 99213 OFFICE O/P EST LOW 20 MIN: CPT | Performed by: FAMILY MEDICINE

## 2024-07-23 PROCEDURE — 3080F DIAST BP >= 90 MM HG: CPT | Performed by: FAMILY MEDICINE

## 2024-07-23 PROCEDURE — 96372 THER/PROPH/DIAG INJ SC/IM: CPT | Performed by: FAMILY MEDICINE

## 2024-07-23 PROCEDURE — 1160F RVW MEDS BY RX/DR IN RCRD: CPT | Performed by: FAMILY MEDICINE

## 2024-07-23 PROCEDURE — 1126F AMNT PAIN NOTED NONE PRSNT: CPT | Performed by: FAMILY MEDICINE

## 2024-07-23 PROCEDURE — 1159F MED LIST DOCD IN RCRD: CPT | Performed by: FAMILY MEDICINE

## 2024-07-23 PROCEDURE — 3075F SYST BP GE 130 - 139MM HG: CPT | Performed by: FAMILY MEDICINE

## 2024-07-23 RX ORDER — PREDNISONE 20 MG/1
TABLET ORAL
Qty: 19 TABLET | Refills: 0 | Status: SHIPPED | OUTPATIENT
Start: 2024-07-23 | End: 2024-08-05

## 2024-07-23 RX ORDER — AMLODIPINE BESYLATE 5 MG/1
1 TABLET ORAL DAILY
COMMUNITY
Start: 2024-06-29

## 2024-07-23 RX ORDER — METHYLPREDNISOLONE ACETATE 40 MG/ML
40 INJECTION, SUSPENSION INTRA-ARTICULAR; INTRALESIONAL; INTRAMUSCULAR; SOFT TISSUE ONCE
Status: COMPLETED | OUTPATIENT
Start: 2024-07-23 | End: 2024-07-23

## 2024-07-23 RX ADMIN — METHYLPREDNISOLONE ACETATE 40 MG: 40 INJECTION, SUSPENSION INTRA-ARTICULAR; INTRALESIONAL; INTRAMUSCULAR; SOFT TISSUE at 16:06

## 2024-07-23 NOTE — PROGRESS NOTES
Office Note     Name: Claude Masterson    : 1970     MRN: [id number]     Chief Complaint  Skin Discoloration (Pt left wrist is pale and has some discoloration /Noticed yesterday /Wants to see about Cortizone shot possible poison ivy )    Subjective     History of Present Illness:  Cluade Masterson is a 53 y.o. male who presents today for evaluation of rash.    Patient notes that over the weekend he was out working in his yard and afterwards he developed a rash on his bilateral arms.  He states he has had multiple cases of poison ivy in the past and his previous PCP would give him a shot of steroid with a prednisone taper and this usually worked very well for him.  He states that when he noticed the rash yesterday he started applying Benadryl cream to the area.  Patient also incidentally noted that on his left wrist where his watchband lies he has some hypopigmentation of his skin.  He notes that he just first noticed that yesterday and is unsure if it may be related to his watchband or to his poison ivy flare.  He states he did not wear his watch while sleeping and he was itching his wrist and his sleep but he is unsure if that is causing the hypopigmentation.  He does note that he had a steroid injection in his wrist for ganglion cyst with orthopedics over 2 months ago, and he did not notice any discoloration when he initially got the injection so he is unsure if it is related to that.    Review of Systems:   Review of Systems   Skin:  Positive for rash.   All other systems reviewed and are negative.      Past Medical History:   Past Medical History:   Diagnosis Date    Anxiety     Anxiety and depression     Arthritis     Cerebrovascular disease     DDD (degenerative disc disease), cervical     Depression     Erectile dysfunction     Glaucoma [id number]    Hormone disorder     Hyperlipidemia     Hypertension     Injury of back     Periarthritis of shoulder     Peripheral neuropathy     Stroke     Rotator cuff syndrome     Stroke        Past Surgical History:   Past Surgical History:   Procedure Laterality Date    BACK SURGERY  2000    L5-S1    BACK SURGERY  2009    L5-S1 replace hardware    HERNIA REPAIR         Family History:   Family History   Problem Relation Age of Onset    Hypertension Mother     Hypertension Father        Social History:   Social History     Socioeconomic History    Marital status: Single   Tobacco Use    Smoking status: Never     Passive exposure: Never    Smokeless tobacco: Never   Vaping Use    Vaping status: Never Used   Substance and Sexual Activity    Alcohol use: No    Drug use: No    Sexual activity: Yes     Partners: Female     Birth control/protection: None       Immunizations:   Immunization History   Administered Date(s) Administered    COVID-19 (PFIZER) BIVALENT 12+YRS 09/26/2022    COVID-19 (PFIZER) Purple Cap Monovalent 04/13/2021, 05/04/2021, 12/22/2021    COVID-19 F23 (PFIZER) 12YRS+ (COMIRNATY) 11/17/2023        Medications:     Current Outpatient Medications:     amantadine (SYMMETREL) 100 MG tablet, , Disp: , Rfl:     amLODIPine (NORVASC) 10 MG tablet, Take 1 tablet by mouth Daily., Disp: , Rfl:     amLODIPine (NORVASC) 5 MG tablet, Take 1 tablet by mouth Daily., Disp: , Rfl:     atorvastatin (LIPITOR) 20 MG tablet, TAKE 1 TABLET BY MOUTH DAILY, Disp: 90 tablet, Rfl: 2    clopidogrel (PLAVIX) 75 MG tablet, Take 1 tablet by mouth Daily., Disp: , Rfl:     Diclofenac Sodium (VOLTAREN) 1 % gel gel, APPLY 2 GRAMS TOPICALLY TO THE AFFECTED AREA 2 TO 3 TIMES DAILY AS NEEDED FOR PAIN, Disp: , Rfl:     famotidine (PEPCID) 20 MG tablet, Take 1 tablet by mouth Daily., Disp: , Rfl:     gabapentin (NEURONTIN) 400 MG capsule, Take 1 capsule 3 times a day by oral route., Disp: , Rfl:     hydrOXYzine (ATARAX) 25 MG tablet, Take 1 tablet by mouth 2 (Two) Times a Day., Disp: , Rfl:     nortriptyline (PAMELOR) 25 MG capsule, Take 2 capsules by mouth Every Night., Disp: 60 capsule,  "Rfl: 11    oxyCODONE-acetaminophen (PERCOCET)  MG per tablet, , Disp: , Rfl:     tadalafil (CIALIS) 10 MG tablet, TAKE 1 TABLET BY MOUTH AS NEEDED FOR ERECTILE DYSFUNCTION, Disp: 20 tablet, Rfl: 1    tamsulosin (FLOMAX) 0.4 MG capsule 24 hr capsule, TAKE 1 CAPSULE BY MOUTH DAILY, Disp: 30 capsule, Rfl: 1    timolol (TIMOPTIC) 0.5 % ophthalmic solution, Administer 1 drop to both eyes 2 (Two) Times a Day., Disp: , Rfl:     predniSONE (DELTASONE) 20 MG tablet, Take 2 tablets by mouth Daily for 7 days, THEN 1 tablet Daily for 3 days, THEN 0.5 tablets Daily for 3 days., Disp: 19 tablet, Rfl: 0    Current Facility-Administered Medications:     methylPREDNISolone acetate (DEPO-medrol) injection 40 mg, 40 mg, Intramuscular, Once, Carline Jama,     Allergies:   Allergies   Allergen Reactions    Penicillins Hives    Sulfa Antibiotics Hives     Unsure if it was a specific sulfa drug or all sulfa drugs    Hydrocodone-Acetaminophen Rash       Objective     Vital Signs  /94   Pulse 77   Ht 182.6 cm (71.89\")   Wt 90.1 kg (198 lb 9.6 oz)   SpO2 97%   BMI 27.02 kg/m²   Estimated body mass index is 27.02 kg/m² as calculated from the following:    Height as of this encounter: 182.6 cm (71.89\").    Weight as of this encounter: 90.1 kg (198 lb 9.6 oz).         Physical Exam  Vitals and nursing note reviewed.   Constitutional:       Appearance: Normal appearance. He is normal weight.   HENT:      Head: Normocephalic and atraumatic.      Nose: Nose normal.      Mouth/Throat:      Mouth: Mucous membranes are moist.   Eyes:      Extraocular Movements: Extraocular movements intact.      Pupils: Pupils are equal, round, and reactive to light.   Cardiovascular:      Rate and Rhythm: Normal rate and regular rhythm.   Pulmonary:      Effort: Pulmonary effort is normal.      Breath sounds: Normal breath sounds.   Abdominal:      General: Abdomen is flat. Bowel sounds are normal.      Palpations: Abdomen is soft. "   Musculoskeletal:         General: Normal range of motion.      Cervical back: Normal range of motion.   Skin:     General: Skin is warm and dry.      Comments: Poison ivy dermatitis located on bilateral posterior forearms.  Some discoloration over left lateral posterior wrist, hypopigmented to surrounding skin.  No redness or drainage to area.   Neurological:      General: No focal deficit present.      Mental Status: He is alert.   Psychiatric:         Mood and Affect: Mood normal.         Behavior: Behavior normal.         Thought Content: Thought content normal.         Judgment: Judgment normal.            Procedures     Results:  No results found for this or any previous visit (from the past 24 hour(s)).     Assessment and Plan     Assessment/Plan:  Diagnoses and all orders for this visit:    1. Poison ivy dermatitis (Primary)  Assessment & Plan:  Will give steroid injection today along with prednisone taper.  Continue topical Benadryl and recommended tech new scrub over-the-counter.  Advised patient to stop wearing watchband and continue monitoring area of hypopigmentation.  Patient is scheduled to follow-up with his hand specialist next month.    Orders:  -     predniSONE (DELTASONE) 20 MG tablet; Take 2 tablets by mouth Daily for 7 days, THEN 1 tablet Daily for 3 days, THEN 0.5 tablets Daily for 3 days.  Dispense: 19 tablet; Refill: 0  -     methylPREDNISolone acetate (DEPO-medrol) injection 40 mg        Follow Up  Return if symptoms worsen or fail to improve.    Carline Jama DO   [facility]

## 2024-07-23 NOTE — ASSESSMENT & PLAN NOTE
Will give steroid injection today along with prednisone taper.  Continue topical Benadryl and recommended tech new scrub over-the-counter.  Advised patient to stop wearing watchband and continue monitoring area of hypopigmentation.  Patient is scheduled to follow-up with his hand specialist next month.   [facility] email [email or web address]

## 2024-07-30 ENCOUNTER — APPOINTMENT (OUTPATIENT)
Dept: CT IMAGING | Facility: HOSPITAL | Age: 54
End: 2024-07-30
Payer: COMMERCIAL

## 2024-07-30 ENCOUNTER — HOSPITAL ENCOUNTER (EMERGENCY)
Facility: HOSPITAL | Age: 54
Discharge: HOME | End: 2024-07-30
Attending: EMERGENCY MEDICINE
Payer: COMMERCIAL

## 2024-07-30 VITALS
HEIGHT: 72 IN | RESPIRATION RATE: 18 BRPM | BODY MASS INDEX: 26.41 KG/M2 | DIASTOLIC BLOOD PRESSURE: 105 MMHG | TEMPERATURE: 97.8 F | HEART RATE: 85 BPM | SYSTOLIC BLOOD PRESSURE: 149 MMHG | WEIGHT: 195 LBS | OXYGEN SATURATION: 96 %

## 2024-07-30 DIAGNOSIS — S76.211A STRAIN OF GROIN, RIGHT, INITIAL ENCOUNTER: Primary | ICD-10-CM

## 2024-07-30 LAB
ALBUMIN SERPL-MCNC: 3.8 G/DL (ref 3.5–5.2)
ALBUMIN/GLOB SERPL: 1.3 G/DL
ALP SERPL-CCNC: 98 U/L (ref 39–117)
ALT SERPL W P-5'-P-CCNC: 36 U/L (ref 1–41)
ANION GAP SERPL CALCULATED.3IONS-SCNC: 10 MMOL/L (ref 5–15)
AST SERPL-CCNC: 19 U/L (ref 1–40)
BASOPHILS # BLD AUTO: 0.01 10*3/MM3 (ref 0–0.2)
BASOPHILS NFR BLD AUTO: 0.1 % (ref 0–1.5)
BILIRUB SERPL-MCNC: 0.7 MG/DL (ref 0–1.2)
BILIRUB UR QL STRIP: NEGATIVE
BUN SERPL-MCNC: 14 MG/DL (ref 6–20)
BUN/CREAT SERPL: 12.4 (ref 7–25)
CALCIUM SPEC-SCNC: 9.2 MG/DL (ref 8.6–10.5)
CHLORIDE SERPL-SCNC: 103 MMOL/L (ref 98–107)
CLARITY UR: CLEAR
CO2 SERPL-SCNC: 27 MMOL/L (ref 22–29)
COLOR UR: YELLOW
CREAT SERPL-MCNC: 1.13 MG/DL (ref 0.76–1.27)
D-LACTATE SERPL-SCNC: 2 MMOL/L (ref 0.5–2)
DEPRECATED RDW RBC AUTO: 39.8 FL (ref 37–54)
EGFRCR SERPLBLD CKD-EPI 2021: 77.2 ML/MIN/1.73
EOSINOPHIL # BLD AUTO: 0 10*3/MM3 (ref 0–0.4)
EOSINOPHIL NFR BLD AUTO: 0 % (ref 0.3–6.2)
ERYTHROCYTE [DISTWIDTH] IN BLOOD BY AUTOMATED COUNT: 11.9 % (ref 12.3–15.4)
GLOBULIN UR ELPH-MCNC: 2.9 GM/DL
GLUCOSE SERPL-MCNC: 153 MG/DL (ref 65–99)
GLUCOSE UR STRIP-MCNC: NEGATIVE MG/DL
HCT VFR BLD AUTO: 40.8 % (ref 37.5–51)
HGB BLD-MCNC: 13.6 G/DL (ref 13–17.7)
HGB UR QL STRIP.AUTO: NEGATIVE
IMM GRANULOCYTES # BLD AUTO: 0.11 10*3/MM3 (ref 0–0.05)
IMM GRANULOCYTES NFR BLD AUTO: 1 % (ref 0–0.5)
KETONES UR QL STRIP: NEGATIVE
LEUKOCYTE ESTERASE UR QL STRIP.AUTO: NEGATIVE
LIPASE SERPL-CCNC: 17 U/L (ref 13–60)
LYMPHOCYTES # BLD AUTO: 1.13 10*3/MM3 (ref 0.7–3.1)
LYMPHOCYTES NFR BLD AUTO: 10.1 % (ref 19.6–45.3)
MCH RBC QN AUTO: 30.2 PG (ref 26.6–33)
MCHC RBC AUTO-ENTMCNC: 33.3 G/DL (ref 31.5–35.7)
MCV RBC AUTO: 90.7 FL (ref 79–97)
MONOCYTES # BLD AUTO: 0.49 10*3/MM3 (ref 0.1–0.9)
MONOCYTES NFR BLD AUTO: 4.4 % (ref 5–12)
NEUTROPHILS NFR BLD AUTO: 84.4 % (ref 42.7–76)
NEUTROPHILS NFR BLD AUTO: 9.5 10*3/MM3 (ref 1.7–7)
NITRITE UR QL STRIP: NEGATIVE
NRBC BLD AUTO-RTO: 0 /100 WBC (ref 0–0.2)
PH UR STRIP.AUTO: 5.5 [PH] (ref 5–8)
PLATELET # BLD AUTO: 259 10*3/MM3 (ref 140–450)
PMV BLD AUTO: 10.5 FL (ref 6–12)
POTASSIUM SERPL-SCNC: 4 MMOL/L (ref 3.5–5.2)
PROT SERPL-MCNC: 6.7 G/DL (ref 6–8.5)
PROT UR QL STRIP: NEGATIVE
RBC # BLD AUTO: 4.5 10*6/MM3 (ref 4.14–5.8)
SODIUM SERPL-SCNC: 140 MMOL/L (ref 136–145)
SP GR UR STRIP: 1.02 (ref 1–1.03)
UROBILINOGEN UR QL STRIP: NORMAL
WBC NRBC COR # BLD AUTO: 11.24 10*3/MM3 (ref 3.4–10.8)

## 2024-07-30 PROCEDURE — 25510000001 IOPAMIDOL 61 % SOLUTION: Performed by: EMERGENCY MEDICINE

## 2024-07-30 PROCEDURE — 83605 ASSAY OF LACTIC ACID: CPT | Performed by: PHYSICIAN ASSISTANT

## 2024-07-30 PROCEDURE — 83690 ASSAY OF LIPASE: CPT | Performed by: PHYSICIAN ASSISTANT

## 2024-07-30 PROCEDURE — 74177 CT ABD & PELVIS W/CONTRAST: CPT

## 2024-07-30 PROCEDURE — 81003 URINALYSIS AUTO W/O SCOPE: CPT | Performed by: PHYSICIAN ASSISTANT

## 2024-07-30 PROCEDURE — 80053 COMPREHEN METABOLIC PANEL: CPT | Performed by: PHYSICIAN ASSISTANT

## 2024-07-30 PROCEDURE — 99285 EMERGENCY DEPT VISIT HI MDM: CPT

## 2024-07-30 PROCEDURE — 85025 COMPLETE CBC W/AUTO DIFF WBC: CPT | Performed by: PHYSICIAN ASSISTANT

## 2024-07-30 RX ORDER — SODIUM CHLORIDE 0.9 % (FLUSH) 0.9 %
10 SYRINGE (ML) INJECTION AS NEEDED
Status: DISCONTINUED | OUTPATIENT
Start: 2024-07-30 | End: 2024-07-30 | Stop reason: HOSPADM

## 2024-07-30 RX ADMIN — IOPAMIDOL 85 ML: 612 INJECTION, SOLUTION INTRAVENOUS at 20:20

## 2024-07-30 NOTE — ED PROVIDER NOTES
EMERGENCY DEPARTMENT ENCOUNTER    Pt Name: Claude Masterson  MRN: [id number]  Pt :   1970  Room Number:  24SF/24  Date of encounter:  2024  PCP: Mahendra Muhammad PA-C  ED Provider: CYNTHIA Olea    Historian: Patient    HPI:  Chief Complaint: Right Groin Pain     Context: Claude Masterson is a 53 y.o. male who presents to the ED c/o pain in his right groin since yesterday.  Patient shares that he packed backpacks for kids over the weekend and did not really have any significant straining or lifting but started to experience pain in his right groin yesterday.  He reports the pain is worse with standing. He has not tried anything for his pain. Reports no additional associated symptoms on exam.   HPI     REVIEW OF SYSTEMS  A chief complaint appropriate review of systems was completed and is negative except as noted in the HPI.     PAST MEDICAL HISTORY  Past Medical History:   Diagnosis Date    Anxiety     Anxiety and depression     Arthritis     Cerebrovascular disease     DDD (degenerative disc disease), cervical     Depression     Erectile dysfunction     Glaucoma [id number]    Hormone disorder     Hyperlipidemia     Hypertension     Injury of back     Periarthritis of shoulder     Peripheral neuropathy     Stroke    Rotator cuff syndrome     Stroke        PAST SURGICAL HISTORY  Past Surgical History:   Procedure Laterality Date    BACK SURGERY      L5-S1    BACK SURGERY      L5-S1 replace hardware    HERNIA REPAIR         FAMILY HISTORY  Family History   Problem Relation Age of Onset    Hypertension Mother     Hypertension Father        SOCIAL HISTORY  Social History     Socioeconomic History    Marital status: Single   Tobacco Use    Smoking status: Never     Passive exposure: Never    Smokeless tobacco: Never   Vaping Use    Vaping status: Never Used   Substance and Sexual Activity    Alcohol use: No    Drug use: No    Sexual activity: Yes     Partners: Female     Birth  control/protection: None       ALLERGIES  Penicillins, Sulfa antibiotics, and Hydrocodone-acetaminophen    PHYSICAL EXAM  Physical Exam  Vitals and nursing note reviewed.   Constitutional:       General: He is not in acute distress.     Appearance: Normal appearance. He is not ill-appearing or toxic-appearing.   HENT:      Head: Normocephalic and atraumatic.      Nose: Nose normal.      Mouth/Throat:      Mouth: Mucous membranes are moist.   Eyes:      Extraocular Movements: Extraocular movements intact.   Cardiovascular:      Rate and Rhythm: Normal rate.   Pulmonary:      Effort: Pulmonary effort is normal.   Abdominal:      General: There is no distension.      Palpations: Abdomen is soft.      Tenderness: There is no abdominal tenderness.   Musculoskeletal:         General: Normal range of motion.      Cervical back: Normal range of motion and neck supple.        Legs:    Skin:     General: Skin is warm and dry.   Neurological:      General: No focal deficit present.      Mental Status: He is alert.   Psychiatric:         Mood and Affect: Mood normal.         Behavior: Behavior normal.       LAB RESULTS  Results for orders placed or performed during the hospital encounter of 07/30/24   Comprehensive Metabolic Panel    Specimen: Blood   Result Value Ref Range    Glucose 153 (H) 65 - 99 mg/dL    BUN 14 6 - 20 mg/dL    Creatinine 1.13 0.76 - 1.27 mg/dL    Sodium 140 136 - 145 mmol/L    Potassium 4.0 3.5 - 5.2 mmol/L    Chloride 103 98 - 107 mmol/L    CO2 27.0 22.0 - 29.0 mmol/L    Calcium 9.2 8.6 - 10.5 mg/dL    Total Protein 6.7 6.0 - 8.5 g/dL    Albumin 3.8 3.5 - 5.2 g/dL    ALT (SGPT) 36 1 - 41 U/L    AST (SGOT) 19 1 - 40 U/L    Alkaline Phosphatase 98 39 - 117 U/L    Total Bilirubin 0.7 0.0 - 1.2 mg/dL    Globulin 2.9 gm/dL    A/G Ratio 1.3 g/dL    BUN/Creatinine Ratio 12.4 7.0 - 25.0    Anion Gap 10.0 5.0 - 15.0 mmol/L    eGFR 77.2 >60.0 mL/min/1.73   Lipase    Specimen: Blood   Result Value Ref Range     Lipase 17 13 - 60 U/L   Urinalysis With Microscopic If Indicated (No Culture) - Urine, Clean Catch    Specimen: Urine, Clean Catch   Result Value Ref Range    Color, UA Yellow Yellow, Straw    Appearance, UA Clear Clear    pH, UA 5.5 5.0 - 8.0    Specific Gravity, UA 1.020 1.001 - 1.030    Glucose, UA Negative Negative    Ketones, UA Negative Negative    Bilirubin, UA Negative Negative    Blood, UA Negative Negative    Protein, UA Negative Negative    Leuk Esterase, UA Negative Negative    Nitrite, UA Negative Negative    Urobilinogen, UA 1.0 E.U./dL 0.2 - 1.0 E.U./dL   Lactic Acid, Plasma    Specimen: Blood   Result Value Ref Range    Lactate 2.0 0.5 - 2.0 mmol/L   CBC Auto Differential    Specimen: Blood   Result Value Ref Range    WBC 11.24 (H) 3.40 - 10.80 10*3/mm3    RBC 4.50 4.14 - 5.80 10*6/mm3    Hemoglobin 13.6 13.0 - 17.7 g/dL    Hematocrit 40.8 37.5 - 51.0 %    MCV 90.7 79.0 - 97.0 fL    MCH 30.2 26.6 - 33.0 pg    MCHC 33.3 31.5 - 35.7 g/dL    RDW 11.9 (L) 12.3 - 15.4 %    RDW-SD 39.8 37.0 - 54.0 fl    MPV 10.5 6.0 - 12.0 fL    Platelets 259 140 - 450 10*3/mm3    Neutrophil % 84.4 (H) 42.7 - 76.0 %    Lymphocyte % 10.1 (L) 19.6 - 45.3 %    Monocyte % 4.4 (L) 5.0 - 12.0 %    Eosinophil % 0.0 (L) 0.3 - 6.2 %    Basophil % 0.1 0.0 - 1.5 %    Immature Grans % 1.0 (H) 0.0 - 0.5 %    Neutrophils, Absolute 9.50 (H) 1.70 - 7.00 10*3/mm3    Lymphocytes, Absolute 1.13 0.70 - 3.10 10*3/mm3    Monocytes, Absolute 0.49 0.10 - 0.90 10*3/mm3    Eosinophils, Absolute 0.00 0.00 - 0.40 10*3/mm3    Basophils, Absolute 0.01 0.00 - 0.20 10*3/mm3    Immature Grans, Absolute 0.11 (H) 0.00 - 0.05 10*3/mm3    nRBC 0.0 0.0 - 0.2 /100 WBC       If labs were ordered, I independently reviewed the results and considered them in treating the patient.    RADIOLOGY  CT Abdomen Pelvis With Contrast   Final Result   Impression:   1.Questionable mild bladder wall thickening. Correlate with urinalysis.   2.No other definite acute abdominal  or pelvic abnormality is identified at this time.   3.Hepatic steatosis.            Electronically Signed: Raza Cortez     7/30/2024 9:00 PM EDT     Workstation ID: [id number]        [x] Radiologist's Report Reviewed:  I ordered and independently interpreted the above noted radiographic studies.  See radiologist's dictation for official interpretation.      PROCEDURES    Procedures    No orders to display       MEDICATIONS GIVEN IN ER    Medications   sodium chloride 0.9 % flush 10 mL (has no administration in time range)   iopamidol (ISOVUE-300) 61 % injection 100 mL (85 mL Intravenous Given 7/30/24 2020)       MEDICAL DECISION MAKING, PROGRESS, and CONSULTS   Medical Decision Making  53-year-old male presents the ED with complaints of right groin pain after lifting boxes this weekend.  No acute or emergent findings on physical exam.  Labs without acute or emergent abnormalities.  CT scan of the abdomen pelvis without acute or emergent abnormalities.  Patient instructed on symptomatic care with rest, Tylenol and or ibuprofen as needed for pain and follow-up with primary care.  Discharged with return precautions.    Problems Addressed:  Strain of groin, right, initial encounter: complicated acute illness or injury    Amount and/or Complexity of Data Reviewed  Labs: ordered. Decision-making details documented in ED Course.  Radiology: ordered and independent interpretation performed. Decision-making details documented in ED Course.    Risk  Prescription drug management.      Discussion below represents my analysis of pertinent findings related to patient's condition, differential diagnosis, treatment plan and final disposition.    Differential diagnosis:  The differential diagnosis associated with the patient's presentation includes: Musculoskeletal strain, hernia, obstruction, UTI    Additional sources  Discussed/ obtained information from independent historians:   [] Spouse  [] Parent  [] Family member  []  Friend  [] EMS   [] Other:  External (non-ED) record review:   [] Inpatient record:   [] Office record:   [] Outpatient record:   [] Prior Outpatient labs:   [] Prior Outpatient radiology:   [] Primary Care record:   [] Outside ED record:   [] Other:   Patient's care impacted by:   [] Diabetes  [x] Hypertension  [] Hyperlipidemia  [] Hypothyroidism   [] Coronary Artery Disease   [] COPD   [] Cancer   [] Obesity  [] GERD   [] Tobacco Abuse   [] Substance Abuse    [x] Anxiety   [x] Depression   [] Other:   Care significantly affected by Social Determinants of Health (housing and economic circumstances, unemployment)    [] Yes     [x] No   If yes, Patient's care significantly limited by  Social Determinants of Health including:   [] Inadequate housing   [] Low income   [] Alcoholism and drug addiction in family   [] Problems related to primary support group   [] Unemployment   [] Problems related to employment   [] Other Social Determinants of Health:   Shared decision making:  I had a discussion with the patient/family regarding diagnosis, diagnostic results, treatment plan, and medications.  The patient/family indicated understanding of these instructions.  I spent adequate time at the bedside preceding discharge necessary to personally discuss the aftercare instructions, giving patient education, providing explanations of the results of our evaluations/findings, and my decision making to assure that the patient/family understand the plan of care.  Time was allotted to answer questions at that time and throughout the ED course.  Emphasis was placed on timely follow-up after discharge.  I also discussed the potential for the development of an acute emergent condition requiring further evaluation, admission, or even surgical intervention. I discussed that we found nothing during the visit today indicating the need for further workup, admission, or the presence of an unstable medical condition.  I encouraged the patient  to return to the emergency department immediately for ANY concerns, worsening, new complaints, or if symptoms persist and unable to seek follow-up in a timely fashion.  The patient/family expressed understanding and agreement with this plan.  The patient will follow-up with primary care for reevaluation.      Orders placed during this visit:  Orders Placed This Encounter   Procedures    CT Abdomen Pelvis With Contrast    Comprehensive Metabolic Panel    Lipase    Urinalysis With Microscopic If Indicated (No Culture) - Urine, Clean Catch    Lactic Acid, Plasma    CBC Auto Differential    Insert Peripheral IV    CBC & Differential       I considered prescription management  with:   [x] Pain medication: The use of prescription pain medication was considered in this patient however not implemented as risks outweigh benefits of prescription pain management and it was felt patient's symptoms could be managed with OTC anti-inflammatory medications and Tylenol.     [] Antiviral  [] Antibiotic   [] Other:   Rationale:  ED Course:    ED Course as of 07/30/24 2221 Tue Jul 30, 2024 2100 Vitals and Telemetry tracing was reviewed and directly interpreted by myself demonstrating blood pressure 149/105, afebrile, heart rate 85, respirations 18 breaths/min [place] saturation 96% on room air [JG]   2100 BP(!): 149/105 [JG]   2100 Temp: 97.8 °F (36.6 °C) [JG]   2100 Heart Rate: 85 [JG]   2100 Resp: 18 [JG]   2100 SpO2: 96 % [JG]   2104 Labs studies were reviewed and directly interpreted by myself and demonstrated no acute or emergent abnormalities.   [JG]   2108 CT scan  of abdomen/pelvis personally interpreted by myself and with official read provided by radiology demonstrates no definite acute abdominal or pelvic abnormality is identified at this time. [JG]      ED Course User Index  [JG] Maurilio Roblero PA            DIAGNOSIS  Final diagnoses:   Strain of groin, right, initial encounter       DISPOSITION    ED Disposition        ED Disposition   Discharge    Condition   Stable    Comment   --           DISCHARGE    Patient discharged in stable condition.    Reviewed implications of results, diagnosis, meds, responsibility to follow up, warning signs and symptoms of possible worsening, potential complications and reasons to return to ER.    Patient/Family voiced understanding of above instructions.    Discussed plan for discharge, as there is no emergent indication for admission.  Pt/family is agreeable and understands need for follow up and possible repeat testing.  Pt/family is aware that discharge does not mean that nothing is wrong but that it indicates no emergency is currently present that requires admission and they must continue care with follow-up as given below or with a physician of their choice.     FOLLOW-UP  Maehndra Muhammad PA-C  [address] [facility] [address] [phone]    Schedule an appointment as soon as possible for a visit   Call for follow up with primary care    [facility] EMERGENCY DEPARTMENT  [address] [address] [phone]  Go to   If symptoms worsen         Medication List      No changes were made to your prescriptions during this visit.          Please note that portions of this document were completed with voice recognition software.        Maurilio Roblero PA  07/30/24 [id number]

## 2024-08-06 DIAGNOSIS — R39.9 LOWER URINARY TRACT SYMPTOMS (LUTS): ICD-10-CM

## 2024-08-06 RX ORDER — TAMSULOSIN HYDROCHLORIDE 0.4 MG/1
1 CAPSULE ORAL DAILY
Qty: 30 CAPSULE | Refills: 1 | Status: SHIPPED | OUTPATIENT
Start: 2024-08-06

## 2024-08-06 NOTE — TELEPHONE ENCOUNTER
Rx Refill Note  Requested Prescriptions     Pending Prescriptions Disp Refills    tamsulosin (FLOMAX) 0.4 MG capsule 24 hr capsule 30 capsule 1     Sig: Take 1 capsule by mouth Daily.      Last office visit with prescribing clinician: 4/4/2024   Last telemedicine visit with prescribing clinician: Visit date not found   Next office visit with prescribing clinician: 10/9/2024    Imer Richardson MA  08/06/24, 09:25 EDT

## 2024-08-28 ENCOUNTER — OFFICE VISIT (OUTPATIENT)
Age: 54
End: 2024-08-28
Payer: COMMERCIAL

## 2024-08-28 VITALS
SYSTOLIC BLOOD PRESSURE: 132 MMHG | DIASTOLIC BLOOD PRESSURE: 88 MMHG | BODY MASS INDEX: 26.28 KG/M2 | HEIGHT: 72 IN | WEIGHT: 194 LBS

## 2024-08-28 DIAGNOSIS — M77.8 TENDINITIS OF EXTENSOR TENDON OF HAND: Primary | ICD-10-CM

## 2024-08-28 NOTE — PROGRESS NOTES
[facility] Orthopedic     Office Visit       Date: 08/28/2024   Patient Name: Claude Masterson  MRN: [id number]  YOB: 1970    Referring Physician: Mahendra Muhammad PA-C     Chief Complaint:   Chief Complaint   Patient presents with    Follow-up     3 month recheck - Left Tendinitis of extensor tendon of hand and  Arthritis of wrist           History of Present Illness:   Claude Masterson is a 54 y.o. male left-hand-dominant presenting to clinic for follow-up of left de Quervain's tenosynovitis and mild radiocarpal joint arthritis.  At his last clinic visit he was provided a corticosteroid injection into the first extensor compartment and a thumb spica brace.  He reports the injection has improved his symptoms significantly.  He denies any current pain or discomfort.  He has been intermittently using the brace.  No other complaints or concerns.    Subjective   Review of Systems:   Review of Systems   Constitutional:  Negative for chills, fever, unexpected weight gain and unexpected weight loss.   HENT:  Negative for congestion, postnasal drip and rhinorrhea.    Eyes:  Negative for blurred vision.   Respiratory:  Negative for shortness of breath.    Cardiovascular:  Negative for leg swelling.   Gastrointestinal:  Negative for abdominal pain, nausea and vomiting.   Genitourinary:  Negative for difficulty urinating.   Musculoskeletal:  Positive for arthralgias. Negative for gait problem, joint swelling and myalgias.   Skin:  Negative for skin lesions and wound.   Neurological:  Negative for dizziness, weakness, light-headedness and numbness.   Hematological:  Does not bruise/bleed easily.   Psychiatric/Behavioral:  Negative for depressed mood.    All other systems reviewed and are negative.     Pertinent review of systems per HPI    I reviewed the patient's chief complaint, history of present illness, review of systems, past medical history,  "surgical history, family history, social history, medications and allergy list in the EMR on 08/28/2024 and agree with the findings above.    Objective    Vital Signs:   Vitals:    08/28/24 0909   BP: 132/88   Weight: 88 kg (194 lb)   Height: 182.9 cm (72.01\")     BMI:      General: No acute distress. Alert and oriented.   Cardiovascular: Palpable radial pulse.   Respiratory: Breathing is nonlabored.     Ortho Exam:  Examination of the left upper extremity demonstrates no deformity.  No skin lesions.  There is a small area of hypopigmentation noted along the first center compartment.  No deformity.  The first center compartment is nontender at the radial styloid.  Negative Finkelstein's test.  He is able to make a full composite fist.  Full wrist range of motion.  Sensation is intact light touch throughout the hand.  Warm and well-perfused distally.    Imaging / Studies:    Imaging Results (Last 24 Hours)       ** No results found for the last 24 hours. **          Assessment / Plan    Assessment/Plan:   Claude Masterson is a 54 y.o. male with left de Quervain's tenosynovitis.     The patient has had improvements in his de Quervain's tenosynovitis after corticosteroid injection and thumb spica splinting.  He has full painless range of motion.  He may continue to use the extremity without restriction.  He may return to clinic as needed if symptoms worsen or fail to improve.  All questions and concerns were addressed.  He was agreeable.      ICD-10-CM ICD-9-CM   1. Tendinitis of extensor tendon of hand  M77.8 727.05     Follow Up:   Return if symptoms worsen or fail to improve.      Kelly Duran MD  [facility]  "

## 2024-11-14 ENCOUNTER — OFFICE VISIT (OUTPATIENT)
Dept: UROLOGY | Facility: CLINIC | Age: 54
End: 2024-11-14
Payer: MEDICAID

## 2024-11-14 VITALS — SYSTOLIC BLOOD PRESSURE: 122 MMHG | HEART RATE: 94 BPM | OXYGEN SATURATION: 96 % | DIASTOLIC BLOOD PRESSURE: 76 MMHG

## 2024-11-14 DIAGNOSIS — N52.9 ERECTILE DYSFUNCTION, UNSPECIFIED ERECTILE DYSFUNCTION TYPE: Primary | ICD-10-CM

## 2024-11-14 DIAGNOSIS — R39.9 LOWER URINARY TRACT SYMPTOMS (LUTS): ICD-10-CM

## 2024-11-14 PROCEDURE — 1160F RVW MEDS BY RX/DR IN RCRD: CPT | Performed by: NURSE PRACTITIONER

## 2024-11-14 PROCEDURE — 3074F SYST BP LT 130 MM HG: CPT | Performed by: NURSE PRACTITIONER

## 2024-11-14 PROCEDURE — 3078F DIAST BP <80 MM HG: CPT | Performed by: NURSE PRACTITIONER

## 2024-11-14 PROCEDURE — 99213 OFFICE O/P EST LOW 20 MIN: CPT | Performed by: NURSE PRACTITIONER

## 2024-11-14 PROCEDURE — 1159F MED LIST DOCD IN RCRD: CPT | Performed by: NURSE PRACTITIONER

## 2024-11-14 RX ORDER — TADALAFIL 20 MG/1
20 TABLET ORAL DAILY PRN
Qty: 30 TABLET | Refills: 1 | Status: SHIPPED | OUTPATIENT
Start: 2024-11-14

## 2024-11-14 NOTE — PROGRESS NOTES
Follow Up Office Visit      Patient Name: Claude Masterson  : 1970   MRN: [id number]     Chief Complaint:    Chief Complaint   Patient presents with    Erectile Dysfunction    LUTS       Referring Provider: No ref. provider found    History of Present Illness: Claude Masterson is a 54 y.o. male who presents today for follow up of lower urinary tract symtoms. He is taking Flomax once daily. He reports intermittency of urinary stream and feeling of incomplete bladder emptying. He reports Flomax has helped with his stream. He reports nocturia x 1. He denies urinary straining. He reports urinary frequency and urgency during the day.     He is able to obtain erections with Cialis 10mg. He is losing the erection within 5-10 minutes.     PSA          3/11/2024    11:49   PSA   PSA 0.528         Subjective      Review of System: Review of Systems   Genitourinary:  Positive for frequency and urgency.        Erectile dysfunction   All other systems reviewed and are negative.     I have reviewed the ROS documented by my clinical staff, I have updated appropriately and I agree. REGLA Tripathi    I have reviewed and the following portions of the patient's history were updated as appropriate: past family history, past medical history, past social history, past surgical history and problem list.    Medications:     Current Outpatient Medications:     amantadine (SYMMETREL) 100 MG tablet, , Disp: , Rfl:     amLODIPine (NORVASC) 10 MG tablet, Take 1 tablet by mouth Daily., Disp: , Rfl:     amLODIPine (NORVASC) 5 MG tablet, Take 1 tablet by mouth Daily., Disp: , Rfl:     atorvastatin (LIPITOR) 20 MG tablet, TAKE 1 TABLET BY MOUTH DAILY, Disp: 90 tablet, Rfl: 2    clopidogrel (PLAVIX) 75 MG tablet, Take 1 tablet by mouth Daily., Disp: , Rfl:     Diclofenac Sodium (VOLTAREN) 1 % gel gel, APPLY 2 GRAMS TOPICALLY TO THE AFFECTED AREA 2 TO 3 TIMES DAILY AS NEEDED FOR PAIN, Disp: , Rfl:     famotidine (PEPCID) 20 MG tablet,  Take 1 tablet by mouth Daily., Disp: , Rfl:     gabapentin (NEURONTIN) 400 MG capsule, Take 1 capsule 3 times a day by oral route., Disp: , Rfl:     hydrOXYzine (ATARAX) 25 MG tablet, Take 1 tablet by mouth 2 (Two) Times a Day., Disp: , Rfl:     nortriptyline (PAMELOR) 25 MG capsule, Take 2 capsules by mouth Every Night., Disp: 60 capsule, Rfl: 11    oxyCODONE-acetaminophen (PERCOCET)  MG per tablet, , Disp: , Rfl:     tamsulosin (FLOMAX) 0.4 MG capsule 24 hr capsule, Take 1 capsule by mouth Daily., Disp: 30 capsule, Rfl: 1    timolol (TIMOPTIC) 0.5 % ophthalmic solution, Administer 1 drop to both eyes 2 (Two) Times a Day., Disp: , Rfl:     tadalafil (Cialis) 20 MG tablet, Take 1 tablet by mouth Daily As Needed for Erectile Dysfunction., Disp: 30 tablet, Rfl: 1    Allergies:   Allergies   Allergen Reactions    Penicillins Hives    Sulfa Antibiotics Hives     Unsure if it was a specific sulfa drug or all sulfa drugs    Hydrocodone-Acetaminophen Rash       IPSS Questionnaire (AUA-7):  Over the past month…    1)  Incomplete Emptying:       How often have you had a sensation of not emptying you had the sensation of not emptying your bladder completely after you finished urinating?  2 - Less than half the time   2)  Frequency:       How often have you had the urinate again less than two hours after you finished urinating?  1 - Less than 1 time in 5   3)  Intermittency:       How often have you found you stopped and started again several times when you urinated?   1 - Less than 1 time in 5   4) Urgency:      How often have you found it difficult to postpone urination?  1 - Less than 1 time in 5   5) Weak Stream:      How often have you had a weak urinary stream?  2 - Less than half the time   6) Straining:       How often have you had to push or strain to begin urination?  0 - Not at all   7) Nocturia:      How many times did you most typically get up to urinate from the time you went to bed at night until the time  you got up in the morning?  1 - 1 time   Total Score:  8   The International Prostate Symptom Score (IPSS) is used to screen, diagnose, track symptoms of benign prostatic hyperplasia (BPH).   0-7 (Mild Symptoms) 8-19 (Moderate) 20-35 (Severe)   Quality of Life (QoL):  If you were to spend the rest of your life with your urinary condition just the way it is now, how would you feel about that? 4-Mostly Dissatisfied   Urine Leakage (Incontinence) 0-No Leakage     Objective     Physical Exam:   Vital Signs:   Vitals:    11/14/24 1541   BP: 122/76   Pulse: 94   SpO2: 96%     There is no height or weight on file to calculate BMI.     Physical Exam  Vitals and nursing note reviewed.   Constitutional:       Appearance: Normal appearance.   HENT:      Head: Normocephalic and atraumatic.      Nose: Nose normal.      Mouth/Throat:      Mouth: Mucous membranes are moist.   Eyes:      Pupils: Pupils are equal, round, and reactive to light.   Pulmonary:      Effort: Pulmonary effort is normal.   Abdominal:      General: Abdomen is flat.      Palpations: Abdomen is soft.   Musculoskeletal:         General: Normal range of motion.      Cervical back: Normal range of motion.   Skin:     General: Skin is warm and dry.      Capillary Refill: Capillary refill takes less than 2 seconds.   Neurological:      General: No focal deficit present.      Mental Status: He is alert.   Psychiatric:         Mood and Affect: Mood normal.       Labs:   Brief Urine Lab Results  (Last result in the past 365 days)        Color   Clarity   Blood   Leuk Est   Nitrite   Protein   CREAT   Urine HCG        11/20/24 1135 Yellow   Clear   Negative   Negative   Negative   Negative                        Lab Results   Component Value Date    GLUCOSE 88 11/20/2024    CALCIUM 8.6 11/20/2024     11/20/2024    K 3.9 11/20/2024    CO2 22.0 11/20/2024     11/20/2024    BUN 7 11/20/2024    CREATININE 1.06 11/20/2024    BCR 6.6 (L) 11/20/2024    ANIONGAP  11.0 11/20/2024       Lab Results   Component Value Date    WBC 13.04 (H) 11/20/2024    HGB 13.4 11/20/2024    HCT 40.0 11/20/2024    MCV 88.5 11/20/2024     11/20/2024       Images:   CT Abdomen Pelvis With Contrast    Result Date: 7/30/2024  Impression: 1.Questionable mild bladder wall thickening. Correlate with urinalysis. 2.No other definite acute abdominal or pelvic abnormality is identified at this time. 3.Hepatic steatosis. Electronically Signed: Raza Kleinjana  7/30/2024 9:00 PM EDT  Workstation ID: [id number]      Measures:   Tobacco:   Claude Masterson  reports that he has never smoked. He has never been exposed to tobacco smoke. He has never used smokeless tobacco.           Urine Incontinence: Patient reports that he is not currently experiencing any symptoms of urinary incontinence.      Assessment / Plan      Assessment/Plan:   54 y.o. male who presented today for follow up of erectile dysfunction and lower urinary tract symptoms. Cialis refilled.We discussed his continued lower urinary tract symptoms despite flomax. We discussed proceeding with anatomical evaluation to include Cystoscopy, Transrectal ultrasound, uroflow/PVR. He is understanding and wishes to proceed.     Diagnoses and all orders for this visit:    1. Erectile dysfunction, unspecified erectile dysfunction type (Primary)  -     tadalafil (Cialis) 20 MG tablet; Take 1 tablet by mouth Daily As Needed for Erectile Dysfunction.  Dispense: 30 tablet; Refill: 1    2. Lower urinary tract symptoms (LUTS)         Follow Up:   Return for Cystoscopy, TRUS, uroflow/PVR.    I spent approximately 20 minutes providing clinical care for this patient; including review of patient's chart and provider documentation, face to face time spent with patient in examination room (obtaining history, performing physical exam, discussing diagnosis and management options), placing orders, and completing patient documentation.     REGLA Verdin  [facility]  [place]

## 2024-11-20 ENCOUNTER — APPOINTMENT (OUTPATIENT)
Dept: GENERAL RADIOLOGY | Facility: HOSPITAL | Age: 54
End: 2024-11-20
Payer: MEDICAID

## 2024-11-20 ENCOUNTER — HOSPITAL ENCOUNTER (EMERGENCY)
Facility: HOSPITAL | Age: 54
Discharge: HOME | End: 2024-11-20
Attending: EMERGENCY MEDICINE | Admitting: EMERGENCY MEDICINE
Payer: MEDICAID

## 2024-11-20 ENCOUNTER — APPOINTMENT (OUTPATIENT)
Dept: CT IMAGING | Facility: HOSPITAL | Age: 54
End: 2024-11-20
Payer: MEDICAID

## 2024-11-20 VITALS
DIASTOLIC BLOOD PRESSURE: 108 MMHG | OXYGEN SATURATION: 93 % | HEART RATE: 80 BPM | TEMPERATURE: 98.7 F | BODY MASS INDEX: 26.41 KG/M2 | HEIGHT: 72 IN | WEIGHT: 195 LBS | SYSTOLIC BLOOD PRESSURE: 146 MMHG | RESPIRATION RATE: 18 BRPM

## 2024-11-20 DIAGNOSIS — R10.9 ACUTE ABDOMINAL PAIN IN LEFT FLANK: Primary | ICD-10-CM

## 2024-11-20 DIAGNOSIS — R07.89 ACUTE CHEST WALL PAIN: ICD-10-CM

## 2024-11-20 DIAGNOSIS — Z86.73 HISTORY OF CVA (CEREBROVASCULAR ACCIDENT): ICD-10-CM

## 2024-11-20 DIAGNOSIS — Z86.79 HISTORY OF HYPERTENSION: ICD-10-CM

## 2024-11-20 LAB
ALBUMIN SERPL-MCNC: 4.1 G/DL (ref 3.5–5.2)
ALBUMIN/GLOB SERPL: 1.4 G/DL
ALP SERPL-CCNC: 102 U/L (ref 39–117)
ALT SERPL W P-5'-P-CCNC: 14 U/L (ref 1–41)
ANION GAP SERPL CALCULATED.3IONS-SCNC: 11 MMOL/L (ref 5–15)
AST SERPL-CCNC: 17 U/L (ref 1–40)
BASOPHILS # BLD AUTO: 0.05 10*3/MM3 (ref 0–0.2)
BASOPHILS NFR BLD AUTO: 0.4 % (ref 0–1.5)
BILIRUB SERPL-MCNC: 1 MG/DL (ref 0–1.2)
BILIRUB UR QL STRIP: NEGATIVE
BUN SERPL-MCNC: 7 MG/DL (ref 6–20)
BUN/CREAT SERPL: 6.6 (ref 7–25)
CALCIUM SPEC-SCNC: 8.6 MG/DL (ref 8.6–10.5)
CHLORIDE SERPL-SCNC: 105 MMOL/L (ref 98–107)
CLARITY UR: CLEAR
CO2 SERPL-SCNC: 22 MMOL/L (ref 22–29)
COLOR UR: YELLOW
CREAT SERPL-MCNC: 1.06 MG/DL (ref 0.76–1.27)
DEPRECATED RDW RBC AUTO: 39.8 FL (ref 37–54)
EGFRCR SERPLBLD CKD-EPI 2021: 82.9 ML/MIN/1.73
EOSINOPHIL # BLD AUTO: 0.18 10*3/MM3 (ref 0–0.4)
EOSINOPHIL NFR BLD AUTO: 1.4 % (ref 0.3–6.2)
ERYTHROCYTE [DISTWIDTH] IN BLOOD BY AUTOMATED COUNT: 12.3 % (ref 12.3–15.4)
FLUAV SUBTYP SPEC NAA+PROBE: NOT DETECTED
FLUBV RNA ISLT QL NAA+PROBE: NOT DETECTED
GEN 5 2HR TROPONIN T REFLEX: <6 NG/L
GLOBULIN UR ELPH-MCNC: 3 GM/DL
GLUCOSE SERPL-MCNC: 88 MG/DL (ref 65–99)
GLUCOSE UR STRIP-MCNC: NEGATIVE MG/DL
HCT VFR BLD AUTO: 40 % (ref 37.5–51)
HGB BLD-MCNC: 13.4 G/DL (ref 13–17.7)
HGB UR QL STRIP.AUTO: NEGATIVE
HOLD SPECIMEN: NORMAL
IMM GRANULOCYTES # BLD AUTO: 0.04 10*3/MM3 (ref 0–0.05)
IMM GRANULOCYTES NFR BLD AUTO: 0.3 % (ref 0–0.5)
KETONES UR QL STRIP: NEGATIVE
LEUKOCYTE ESTERASE UR QL STRIP.AUTO: NEGATIVE
LIPASE SERPL-CCNC: 16 U/L (ref 13–60)
LYMPHOCYTES # BLD AUTO: 1.63 10*3/MM3 (ref 0.7–3.1)
LYMPHOCYTES NFR BLD AUTO: 12.5 % (ref 19.6–45.3)
MCH RBC QN AUTO: 29.6 PG (ref 26.6–33)
MCHC RBC AUTO-ENTMCNC: 33.5 G/DL (ref 31.5–35.7)
MCV RBC AUTO: 88.5 FL (ref 79–97)
MONOCYTES # BLD AUTO: 1.03 10*3/MM3 (ref 0.1–0.9)
MONOCYTES NFR BLD AUTO: 7.9 % (ref 5–12)
NEUTROPHILS NFR BLD AUTO: 10.11 10*3/MM3 (ref 1.7–7)
NEUTROPHILS NFR BLD AUTO: 77.5 % (ref 42.7–76)
NITRITE UR QL STRIP: NEGATIVE
NRBC BLD AUTO-RTO: 0 /100 WBC (ref 0–0.2)
NT-PROBNP SERPL-MCNC: <36 PG/ML (ref 0–900)
PH UR STRIP.AUTO: 5.5 [PH] (ref 5–8)
PLATELET # BLD AUTO: 231 10*3/MM3 (ref 140–450)
PMV BLD AUTO: 9.7 FL (ref 6–12)
POTASSIUM SERPL-SCNC: 3.9 MMOL/L (ref 3.5–5.2)
PROT SERPL-MCNC: 7.1 G/DL (ref 6–8.5)
PROT UR QL STRIP: NEGATIVE
RBC # BLD AUTO: 4.52 10*6/MM3 (ref 4.14–5.8)
SARS-COV-2 RNA RESP QL NAA+PROBE: NOT DETECTED
SODIUM SERPL-SCNC: 138 MMOL/L (ref 136–145)
SP GR UR STRIP: 1.01 (ref 1–1.03)
TROPONIN T DELTA: NORMAL
TROPONIN T SERPL HS-MCNC: <6 NG/L
UROBILINOGEN UR QL STRIP: NORMAL
WBC NRBC COR # BLD AUTO: 13.04 10*3/MM3 (ref 3.4–10.8)
WHOLE BLOOD HOLD COAG: NORMAL
WHOLE BLOOD HOLD SPECIMEN: NORMAL

## 2024-11-20 PROCEDURE — 99285 EMERGENCY DEPT VISIT HI MDM: CPT

## 2024-11-20 PROCEDURE — 87636 SARSCOV2 & INF A&B AMP PRB: CPT | Performed by: EMERGENCY MEDICINE

## 2024-11-20 PROCEDURE — 80053 COMPREHEN METABOLIC PANEL: CPT | Performed by: EMERGENCY MEDICINE

## 2024-11-20 PROCEDURE — 83690 ASSAY OF LIPASE: CPT | Performed by: EMERGENCY MEDICINE

## 2024-11-20 PROCEDURE — 84484 ASSAY OF TROPONIN QUANT: CPT | Performed by: EMERGENCY MEDICINE

## 2024-11-20 PROCEDURE — 85025 COMPLETE CBC W/AUTO DIFF WBC: CPT | Performed by: EMERGENCY MEDICINE

## 2024-11-20 PROCEDURE — 25510000001 IOPAMIDOL PER 1 ML: Performed by: EMERGENCY MEDICINE

## 2024-11-20 PROCEDURE — 71045 X-RAY EXAM CHEST 1 VIEW: CPT

## 2024-11-20 PROCEDURE — 74177 CT ABD & PELVIS W/CONTRAST: CPT

## 2024-11-20 PROCEDURE — 83880 ASSAY OF NATRIURETIC PEPTIDE: CPT | Performed by: EMERGENCY MEDICINE

## 2024-11-20 PROCEDURE — 36415 COLL VENOUS BLD VENIPUNCTURE: CPT

## 2024-11-20 PROCEDURE — 71275 CT ANGIOGRAPHY CHEST: CPT

## 2024-11-20 PROCEDURE — 93005 ELECTROCARDIOGRAM TRACING: CPT | Performed by: EMERGENCY MEDICINE

## 2024-11-20 PROCEDURE — 81003 URINALYSIS AUTO W/O SCOPE: CPT | Performed by: EMERGENCY MEDICINE

## 2024-11-20 RX ORDER — IOPAMIDOL 755 MG/ML
75 INJECTION, SOLUTION INTRAVASCULAR
Status: COMPLETED | OUTPATIENT
Start: 2024-11-20 | End: 2024-11-20

## 2024-11-20 RX ORDER — ASPIRIN 81 MG/1
324 TABLET, CHEWABLE ORAL ONCE
Status: DISCONTINUED | OUTPATIENT
Start: 2024-11-20 | End: 2024-11-20 | Stop reason: HOSPADM

## 2024-11-20 RX ORDER — SODIUM CHLORIDE 0.9 % (FLUSH) 0.9 %
10 SYRINGE (ML) INJECTION AS NEEDED
Status: DISCONTINUED | OUTPATIENT
Start: 2024-11-20 | End: 2024-11-20 | Stop reason: HOSPADM

## 2024-11-20 RX ADMIN — IOPAMIDOL 75 ML: 755 INJECTION, SOLUTION INTRAVENOUS at 12:14

## 2024-11-20 NOTE — ED PROVIDER NOTES
[facility]    EMERGENCY DEPARTMENT ENCOUNTER      Pt Name: Claude Masterson  MRN: [id number]  YOB: 1970  Date of evaluation: 11/20/2024  Provider: Audie Chavez MD    CHIEF COMPLAINT       Chief Complaint   Patient presents with    Chest Pain         HISTORY OF PRESENT ILLNESS   Claude Masterson is a 54 y.o. male who presents to the emergency department w sharp L upper abd, L chest, and L upper back pain that began yesterday. Pain worse w/ pushing over the area and taking a deep breath/twisting the torso. No SOB, diaphoresis, vomiting, diarrhea. Some dry cough. No fever, chills, urinary symptoms.       Nursing notes were reviewed.    REVIEW OF SYSTEMS     ROS:  A chief complaint appropriate review of systems was completed and is negative except as noted in the HPI.      PAST MEDICAL HISTORY     Past Medical History:   Diagnosis Date    Anxiety     Anxiety and depression     Arthritis     Cerebrovascular disease     DDD (degenerative disc disease), cervical     Depression     Erectile dysfunction     Glaucoma [id number]    Hormone disorder     Hyperlipidemia     Hypertension     Injury of back     Periarthritis of shoulder     Peripheral neuropathy 8/27)2017    Stroke    Rotator cuff syndrome     Stroke          SURGICAL HISTORY       Past Surgical History:   Procedure Laterality Date    BACK SURGERY  2000    L5-S1    BACK SURGERY  2009    L5-S1 replace hardware    HERNIA REPAIR           CURRENT MEDICATIONS     No current facility-administered medications for this encounter.    Current Outpatient Medications:     amantadine (SYMMETREL) 100 MG tablet, , Disp: , Rfl:     amLODIPine (NORVASC) 10 MG tablet, Take 1 tablet by mouth Daily., Disp: , Rfl:     amLODIPine (NORVASC) 5 MG tablet, Take 1 tablet by mouth Daily., Disp: , Rfl:     atorvastatin (LIPITOR) 20 MG tablet, TAKE 1 TABLET BY MOUTH DAILY, Disp: 90 tablet, Rfl: 2    clopidogrel (PLAVIX) 75 MG tablet, Take 1 tablet by mouth Daily., Disp: , Rfl:      Diclofenac Sodium (VOLTAREN) 1 % gel gel, APPLY 2 GRAMS TOPICALLY TO THE AFFECTED AREA 2 TO 3 TIMES DAILY AS NEEDED FOR PAIN, Disp: , Rfl:     famotidine (PEPCID) 20 MG tablet, Take 1 tablet by mouth Daily., Disp: , Rfl:     gabapentin (NEURONTIN) 400 MG capsule, Take 1 capsule 3 times a day by oral route., Disp: , Rfl:     hydrOXYzine (ATARAX) 25 MG tablet, Take 1 tablet by mouth 2 (Two) Times a Day., Disp: , Rfl:     nortriptyline (PAMELOR) 25 MG capsule, Take 2 capsules by mouth Every Night., Disp: 60 capsule, Rfl: 11    oxyCODONE-acetaminophen (PERCOCET)  MG per tablet, , Disp: , Rfl:     tadalafil (Cialis) 20 MG tablet, Take 1 tablet by mouth Daily As Needed for Erectile Dysfunction., Disp: 30 tablet, Rfl: 1    tamsulosin (FLOMAX) 0.4 MG capsule 24 hr capsule, Take 1 capsule by mouth Daily., Disp: 30 capsule, Rfl: 1    timolol (TIMOPTIC) 0.5 % ophthalmic solution, Administer 1 drop to both eyes 2 (Two) Times a Day., Disp: , Rfl:     ALLERGIES     Penicillins, Sulfa antibiotics, and Hydrocodone-acetaminophen    FAMILY HISTORY       Family History   Problem Relation Age of Onset    Hypertension Mother     Hypertension Father           SOCIAL HISTORY       Social History     Socioeconomic History    Marital status: Single   Tobacco Use    Smoking status: Never     Passive exposure: Never    Smokeless tobacco: Never   Vaping Use    Vaping status: Never Used   Substance and Sexual Activity    Alcohol use: No    Drug use: No    Sexual activity: Yes     Partners: Female     Birth control/protection: None         PHYSICAL EXAM    (up to 7 for level 4, 8 or more for level 5)     Vitals:    11/20/24 1320 11/20/24 1340 11/20/24 1400 11/20/24 1529   BP: (!) 132/105 137/99 (!) 135/102 (!) 146/108   BP Location:       Patient Position:       Pulse:  78  80   Resp:       Temp:       TempSrc:       SpO2:  93%     Weight:       Height:           General: Awake, alert, no acute distress.  HEENT: Conjunctivae normal.  Neck:  Trachea midline.  Cardiac: Heart regular rate, rhythm, no murmurs, rubs, or gallops  Lungs: Lungs are clear to auscultation, there is no wheezing, rhonchi, or rales. There is no use of accessory muscles.  Chest wall: Mild L chest wall tenderness.  Abdomen: Mild LUQ tenderness. No distention, rebound, guarding.  Musculoskeletal: No deformity.  Neuro: Alert and oriented x 4.  Dermatology: Skin is warm and dry  Psych: Mentation is grossly normal, cognition is grossly normal. Affect is appropriate.        DIAGNOSTIC RESULTS     EKG: All EKGs are interpreted by the Emergency Department Physician who either signs or Co-signs this chart in the absence of a cardiologist.    ECG 12 Lead ED Triage Standing Order; Chest Pain   Preliminary Result   Test Reason : ED Triage Standing Order~   Blood Pressure :   */*   mmHG   Vent. Rate :  77 BPM     Atrial Rate :  77 BPM      P-R Int : 158 ms          QRS Dur :  84 ms       QT Int : 374 ms       P-R-T Axes :  43  36  26 degrees     QTcB Int : 423 ms      Normal sinus rhythm   Normal ECG   When compared with ECG of 20-Nov-2024 10:32, (Unconfirmed)   No significant change was found      Referred By: ED MD           Confirmed By:       ECG 12 Lead ED Triage Standing Order; Chest Pain   Preliminary Result   Test Reason : ED Triage Standing Order~   Blood Pressure :   */*   mmHG   Vent. Rate :  90 BPM     Atrial Rate :  90 BPM      P-R Int : 148 ms          QRS Dur :  86 ms       QT Int : 344 ms       P-R-T Axes :  61  37  44 degrees     QTcB Int : 420 ms      Normal sinus rhythm   Nonspecific T wave abnormality   Abnormal ECG   When compared with ECG of 20-Jan-2023 20:19,   Vent. rate has increased by  35 bpm      Referred By: EDMD           Confirmed By:             RADIOLOGY:   [x] Radiologist's Report Reviewed:  CT Angiogram Chest   Final Result   Impression:   1.No evidence of pulmonary embolism.   2.Mild central and lower lung atelectasis.   3.Stable mild fusiform aneurysm  involving the aortic root dating back to 2009.   4.Abdominal postoperative changes. Correlate with history.            Electronically Signed: Andrea Morocho MD     11/20/2024 12:31 PM EST     Workstation ID: [id number]      CT Abdomen Pelvis With Contrast   Final Result   Impression:   1.No evidence of pulmonary embolism.   2.Mild central and lower lung atelectasis.   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   4.Abdominal postoperative changes. Correlate with history.            Electronically Signed: Andrea Morocho MD     11/20/2024 12:31 PM EST     Workstation ID: [id number]      XR Chest 1 View   Final Result   Impression:   Widened mediastinum with more prominent aortic contours. CT angiogram chest recommended to further assess.         Electronically Signed: Andrea Morocho MD     11/20/2024 11:14 AM EST     Workstation ID: [id number]          I ordered and independently reviewed the above noted radiographic studies.        LABS:    I have reviewed and interpreted all of the currently available lab results from this visit (if applicable):  Results for orders placed or performed during the hospital encounter of 11/20/24   ECG 12 Lead ED Triage Standing Order; Chest Pain    Collection Time: 11/20/24 10:32 AM   Result Value Ref Range    QT Interval 344 ms    QTC Interval 420 ms   High Sensitivity Troponin T    Collection Time: 11/20/24 10:46 AM    Specimen: Blood   Result Value Ref Range    HS Troponin T <6 <22 ng/L   Comprehensive Metabolic Panel    Collection Time: 11/20/24 10:46 AM    Specimen: Blood   Result Value Ref Range    Glucose 88 65 - 99 mg/dL    BUN 7 6 - 20 mg/dL    Creatinine 1.06 0.76 - 1.27 mg/dL    Sodium 138 136 - 145 mmol/L    Potassium 3.9 3.5 - 5.2 mmol/L    Chloride 105 98 - 107 mmol/L    CO2 22.0 22.0 - 29.0 mmol/L    Calcium 8.6 8.6 - 10.5 mg/dL    Total Protein 7.1 6.0 - 8.5 g/dL    Albumin 4.1 3.5 - 5.2 g/dL    ALT (SGPT) 14 1 - 41 U/L    AST (SGOT) 17 1 - 40  U/L    Alkaline Phosphatase 102 39 - 117 U/L    Total Bilirubin 1.0 0.0 - 1.2 mg/dL    Globulin 3.0 gm/dL    A/G Ratio 1.4 g/dL    BUN/Creatinine Ratio 6.6 (L) 7.0 - 25.0    Anion Gap 11.0 5.0 - 15.0 mmol/L    eGFR 82.9 >60.0 mL/min/1.73   Lipase    Collection Time: 11/20/24 10:46 AM    Specimen: Blood   Result Value Ref Range    Lipase 16 13 - 60 U/L   BNP    Collection Time: 11/20/24 10:46 AM    Specimen: Blood   Result Value Ref Range    proBNP <36.0 0.0 - 900.0 pg/mL   CBC Auto Differential    Collection Time: 11/20/24 10:46 AM    Specimen: Blood   Result Value Ref Range    WBC 13.04 (H) 3.40 - 10.80 10*3/mm3    RBC 4.52 4.14 - 5.80 10*6/mm3    Hemoglobin 13.4 13.0 - 17.7 g/dL    Hematocrit 40.0 37.5 - 51.0 %    MCV 88.5 79.0 - 97.0 fL    MCH 29.6 26.6 - 33.0 pg    MCHC 33.5 31.5 - 35.7 g/dL    RDW 12.3 12.3 - 15.4 %    RDW-SD 39.8 37.0 - 54.0 fl    MPV 9.7 6.0 - 12.0 fL    Platelets 231 140 - 450 10*3/mm3    Neutrophil % 77.5 (H) 42.7 - 76.0 %    Lymphocyte % 12.5 (L) 19.6 - 45.3 %    Monocyte % 7.9 5.0 - 12.0 %    Eosinophil % 1.4 0.3 - 6.2 %    Basophil % 0.4 0.0 - 1.5 %    Immature Grans % 0.3 0.0 - 0.5 %    Neutrophils, Absolute 10.11 (H) 1.70 - 7.00 10*3/mm3    Lymphocytes, Absolute 1.63 0.70 - 3.10 10*3/mm3    Monocytes, Absolute 1.03 (H) 0.10 - 0.90 10*3/mm3    Eosinophils, Absolute 0.18 0.00 - 0.40 10*3/mm3    Basophils, Absolute 0.05 0.00 - 0.20 10*3/mm3    Immature Grans, Absolute 0.04 0.00 - 0.05 10*3/mm3    nRBC 0.0 0.0 - 0.2 /100 WBC   Green Top (Gel)    Collection Time: 11/20/24 10:46 AM   Result Value Ref Range    Extra Tube Hold for add-ons.    Lavender Top    Collection Time: 11/20/24 10:46 AM   Result Value Ref Range    Extra Tube hold for add-on    Gold Top - SST    Collection Time: 11/20/24 10:46 AM   Result Value Ref Range    Extra Tube Hold for add-ons.    Gray Top    Collection Time: 11/20/24 10:46 AM   Result Value Ref Range    Extra Tube Hold for add-ons.    Light Blue Top    Collection  Time: 11/20/24 10:46 AM   Result Value Ref Range    Extra Tube Hold for add-ons.    Urinalysis With Microscopic If Indicated (No Culture) - Urine, Clean Catch    Collection Time: 11/20/24 11:35 AM    Specimen: Urine, Clean Catch   Result Value Ref Range    Color, UA Yellow Yellow, Straw    Appearance, UA Clear Clear    pH, UA 5.5 5.0 - 8.0    Specific Gravity, UA 1.007 1.001 - 1.030    Glucose, UA Negative Negative    Ketones, UA Negative Negative    Bilirubin, UA Negative Negative    Blood, UA Negative Negative    Protein, UA Negative Negative    Leuk Esterase, UA Negative Negative    Nitrite, UA Negative Negative    Urobilinogen, UA 0.2 E.U./dL 0.2 - 1.0 E.U./dL   COVID-19 and FLU A/B PCR, 1 HR TAT - Swab, Nasopharynx    Collection Time: 11/20/24 11:36 AM    Specimen: Nasopharynx; Swab   Result Value Ref Range    COVID19 Not Detected Not Detected - Ref. Range    Influenza A PCR Not Detected Not Detected    Influenza B PCR Not Detected Not Detected   ECG 12 Lead ED Triage Standing Order; Chest Pain    Collection Time: 11/20/24 12:49 PM   Result Value Ref Range    QT Interval 374 ms    QTC Interval 423 ms   High Sensitivity Troponin T 2Hr    Collection Time: 11/20/24 12:51 PM    Specimen: Blood   Result Value Ref Range    HS Troponin T <6 <22 ng/L    Troponin T Delta          If labs were ordered, I independently reviewed the results and considered them in treating the patient.      EMERGENCY DEPARTMENT COURSE and DIFFERENTIAL DIAGNOSIS/MDM:   Vitals:  AS OF 11:50 EST    BP - (!) 146/108  HR - 80  TEMP - 98.7 °F (37.1 °C) (Oral)  O2 SATS - 93%        Discussion below represents my analysis of pertinent findings related to patient's condition, differential diagnosis, treatment plan and final disposition.      Differential diagnosis:  The differential diagnosis associated with the patient's presentation includes: PNA, PTX, costochondritis, PE, dissection, gastritis, PUD      Independent interpretations (ECG/rhythm  strip/X-ray/US/CT scan): I independently interpreted the pt CTA chest and abd CT - there is no PE and there is no obstructive change within the abdomen      Patient's care impacted by:   [] Diabetes   [x] Hypertension   [] Coronary Artery Disease   [] Cancer   [x] Other: HLD     Care significantly affected by Social Determinants of Health (housing and economic circumstances, unemployment)    [] Yes     [x] No   If yes, Patient's care significantly limited by  Social Determinants of Health including:    [] Inadequate housing    [] Low income    [] Alcoholism and drug addiction in family    [] Problems related to primary support group    [] Unemployment    [] Problems related to employment    [] Other Social Determinants of Health:       Consideration of admission/observation vs discharge: Considered obs, however sx not consistent with ACS and workup in the ED is unremarkable      ED Course:    ED Course as of 11/22/24 1150   Wed Nov 20, 2024   1525 On reexamination, patient brittany very well-appearing and nontoxic.  Reproducible left chest wall tenderness and left upper abdominal pain.  CT imaging demonstrates nothing acute.  Known thoracic aneurysm appears to be stable without any acute change or evidence of dissection.  ACS workup is unremarkable in the setting of low clinical suspicion.  No evidence of other other emergent process identified on imaging. [NS]      ED Course User Index  [NS] Audie Chavez MD           I had a discussion with the patient/family regarding diagnosis, diagnostic results, treatment plan, and medications.  The patient/family indicated understanding of these instructions.  I spent adequate time at the bedside preceding discharge necessary to personally discuss the aftercare instructions, giving patient education, providing explanations of the results of our evaluations/findings, and my decision making to assure that the patient/family understand the plan of care.  Time was allotted to  answer questions at that time and throughout the ED course.  Emphasis was placed on timely follow-up after discharge.  I also discussed the potential for the development of an acute emergent condition requiring further evaluation, admission, or even surgical intervention. I discussed that we found nothing during the visit today indicating the need for further workup, admission, or the presence of an unstable medical condition.  I encouraged the patient to return to the emergency department immediately for ANY concerns, worsening, new complaints, or if symptoms persist and unable to seek follow-up in a timely fashion.  The patient/family expressed understanding and agreement with this plan.  The patient will follow-up with their PCP in 1-2 days for reevaluation.           PROCEDURES:  Procedures    CRITICAL CARE TIME        FINAL IMPRESSION      1. Acute abdominal pain in left flank    2. Acute chest wall pain    3. History of CVA (cerebrovascular accident)    4. History of hypertension          DISPOSITION/PLAN     ED Disposition       ED Disposition   Discharge    Condition   Stable    Comment   --                 Comment: Please note this report has been produced using speech recognition software.      Audie Chavez MD  Attending Emergency Physician             Audie Chavez MD  11/22/24 0695

## 2024-11-25 LAB
QT INTERVAL: 344 MS
QT INTERVAL: 374 MS
QTC INTERVAL: 420 MS
QTC INTERVAL: 423 MS

## 2024-12-11 ENCOUNTER — PROCEDURE VISIT (OUTPATIENT)
Dept: UROLOGY | Facility: CLINIC | Age: 54
End: 2024-12-11
Payer: COMMERCIAL

## 2024-12-11 VITALS — BODY MASS INDEX: 26.41 KG/M2 | HEIGHT: 72 IN | WEIGHT: 195 LBS

## 2024-12-11 DIAGNOSIS — R39.9 LOWER URINARY TRACT SYMPTOMS (LUTS): Primary | ICD-10-CM

## 2024-12-11 NOTE — PROGRESS NOTES
Preprocedure diagnosis:  Hyperplasia of prostate with LUTS    Postprocedure diagnosis:  Hyperplasia of prostate with LUTS    Procedure:  Diagnostic Cystourethroscopy  Transrectal Ultrasound of the Prostate for Volume  Uroflowmetry + PVR    Attending surgeon:  Seth Cruz MD    Anesthesia:  2% lidocaine jelly intraurethrally    Complications:  None    Indications:  54 y.o. male undergoing a diagnostic cystoscopy and transrectal ultrasound of the prostate for volume for the above mentioned indications.  Informed consent was obtained.      Findings:  Cystoscopic findings normal pendulous, bulbar, membranous urethra.  Within the prostatic urethra there was lateral lobe coaptation, with no median lobe.   Within the urinary bladder formal cystoscopy was performed with normal bladder mucosa and no tumors, masses or stones. Right and left ureteral orifice were identified and in the normal orthotopic position.     The dimensions of the prostate were measured for a calculated volume of 30 g.      Procedure:  The patient was placed in supine position and prepped and draped in sterile fashion with lidocaine jelly instill per the urethra for anesthesia 5 minutes prior to procedure start.  A brief timeout was performed including available nursing staff and the patient.  The 16 Fr digital flexible cystoscope was lubricated and gently advanced into the urethral meatus. The penile, bulbar, and membranous urethra appeared widely patent without obvious stricture or mucosal abnormality. Within the prostatic urethra there was lateral lobe coaptation, with no median lobe. The cytoscope was then advanced into the bladder. The bladder was completely visualized starting with the trigone. There were bilateral orthotopic ureteral orifices. The posterior wall, lateral walls, anterior wall, and dome were completely visualized WITHOUT obvious mucosal lesions, tumors, stones.  The cystoscope was retroflexed and the bladder neck and prostate  were further visualized. The cystoscope was then gently withdrawn while visualizing the urethra and the procedure was then terminated.  The patient tolerated the procedure well.      A digital rectal exam was performed.The rectal ultrasound probe was atraumatically introduced into the rectum.  The prostate and seminal vesicles were inspected systematically using axial and sagittal views with the ultrasound. The ultrasound prove was then removed.     Uroflow:  Average flow rate:5.3ml/s  Maximum flow rate:8.4ml/s  Voided volume:86ml      Follow Up: Post procedurally we have discussed findings of cystoscopy and TRUS for volume.  We have provided educational material taken today regarding potential bladder outlet procedures-reports symptomatic improvement on alpha-blocker.  We will continue with p.o. medical therapy at this time given symptomatic improvement after initiation..  Follow-up:3-4 months urinary symptom check

## 2025-01-23 ENCOUNTER — OFFICE VISIT (OUTPATIENT)
Dept: NEUROLOGY | Facility: CLINIC | Age: 55
End: 2025-01-23
Payer: COMMERCIAL

## 2025-01-23 VITALS
SYSTOLIC BLOOD PRESSURE: 138 MMHG | WEIGHT: 201.2 LBS | OXYGEN SATURATION: 95 % | BODY MASS INDEX: 27.25 KG/M2 | HEIGHT: 72 IN | HEART RATE: 77 BPM | DIASTOLIC BLOOD PRESSURE: 88 MMHG

## 2025-01-23 DIAGNOSIS — G44.209 TENSION HEADACHE: ICD-10-CM

## 2025-01-23 DIAGNOSIS — I69.30 SEQUELAE, POST-STROKE: Primary | ICD-10-CM

## 2025-01-23 PROCEDURE — 3075F SYST BP GE 130 - 139MM HG: CPT | Performed by: PSYCHIATRY & NEUROLOGY

## 2025-01-23 PROCEDURE — 3079F DIAST BP 80-89 MM HG: CPT | Performed by: PSYCHIATRY & NEUROLOGY

## 2025-01-23 PROCEDURE — 1159F MED LIST DOCD IN RCRD: CPT | Performed by: PSYCHIATRY & NEUROLOGY

## 2025-01-23 PROCEDURE — 1160F RVW MEDS BY RX/DR IN RCRD: CPT | Performed by: PSYCHIATRY & NEUROLOGY

## 2025-01-23 PROCEDURE — 99214 OFFICE O/P EST MOD 30 MIN: CPT | Performed by: PSYCHIATRY & NEUROLOGY

## 2025-01-23 RX ORDER — LISINOPRIL 10 MG/1
1 TABLET ORAL DAILY
COMMUNITY
Start: 2024-12-23

## 2025-01-23 NOTE — PROGRESS NOTES
Subjective:    CC: Claude Masterson is seen today for follow-up of stroke    HPI:  Current visit-patient states he has been doing well since his last visit.  He received PT for his balance/mild left-sided weakness and has now been exercising in addition to going to the pool at the .  He also uses [employer] which has helped with his fine motor skills of the left hand.  Continues to take both Plavix and Lipitor 20 mg daily.  His dose of gabapentin was increased by pain management to 400 mg 3 times daily but he has only been taking a dose of twice a day for muscle spasms in his left leg that started after his stroke as well as back pain.  His headaches are also better controlled even though he takes nortriptyline only sporadically (sometimes sleeps late at night due to watching ball games and forgets to take the nortriptyline).     Last visit-patient denies any new strokelike symptoms but continues to have some stiffness and weakness of his left arm despite taking gabapentin 400 mg nightly and tizanidine.  He continues to take Plavix and Lipitor 20 mg daily.  Last lipid panel was as follows-, TG 99, LDL 82, HDL 45.  He is also having about 4-5 headaches a month which are all over the head, dull in nature.  Also continues to have some sleep disturbances despite taking nortriptyline 25 mg in addition to amitriptyline 10 mg nightly that was started by a different provider.  However he does feel slightly groggy during the daytime with amitriptyline.    Last visit-patient states that since he increased his nortriptyline to 35 mg nightly his headache frequency has improved significantly.  He is also sleeping better.  He only had 2 headaches in the past month.  He also denies any new strokelike symptoms.  Continues to take Plavix and Lipitor 20 mg daily.  Has not had his lipid panel checked in over 2 years.  Also denies having any dizziness.    Last visit-patient denies any new strokelike symptoms.  He continues to be on  Plavix and Lipitor 20 mg.  He stopped taking lisinopril as his blood pressure was running too low and his dizziness has subsided.  Does take Norvasc 10 mg.  He has again started to get headaches about twice a week mainly in the back of his head.  He tries to lay down but sometimes the headache does not go away.  Has been taking nortriptyline 25 mg nightly.      Last visit-patient states his headaches have improved significantly since he switch from amitriptyline to nortriptyline and increased his dose to 25 mg nightly.  In fact he has not had any headaches in the past month.  With regards to his stroke he continues to take Plavix and Lipitor 20 mg as well as baclofen 10 mg twice daily.  Today he also complains of lightheadedness and dizziness on standing up and walking.  In fact 2 days ago he almost passed out.  His blood pressure always runs low.    Last visit- patient states he has not had any new symptoms since his last visit.  He just got PT for his shoulder pain but did not get PT for the left side weakness however he does feel stronger on that side.  Continues to take Plavix along with Lipitor 20 mg and baclofen 10 mg twice a day.  With regards to his headaches he states that they have recently started coming back but he does not want to increase the dose of amitriptyline 10 mg as he feels groggy in the mornings.    Last visit-patient states that the baclofen has helped a little with his left arm spasticity.  He takes 1, 10 mg tab in the morning and 1 in the afternoon as he takes his opiates at night.  He also got a pain pump for his low back pain however had to get it removed due to feeling extremely lethargic.  For his stroke he continues to be on Plavix 75 mg and Lipitor 20 mg daily.  Has not had any episodes of weakness but has occasional pain in his right shoulder and right elbow.  Denies any numbness in the hands.  His headaches are also well controlled after he started amitriptyline 10 mg at night  although he takes a higher dose at times to help him sleep.      Last visit-patient states that since the last time he started taking baclofen 10 mg twice a day and it has helped with his left arm stiffness.  His weakness also seems to have improved without physical therapy which she could not get due to COVID.  Continues to take Plavix along with Lipitor 20 mg daily.  His last lipid panel was as follows-, , LDL 83, HDL 45.  A1c was 4.6.  His Percocet dose was also increased recently and he is in the process of getting a pain pump for his chronic low back pain.  Today he also complains of right-sided headaches that he has been getting at least a few times a week.  He denies having any associated symptoms of nausea, vomiting, photophobia or phonophobia.  He typically takes a Tylenol and lays down when he has the headache.  Has been taking amitriptyline only as needed for sleep at night.    Initial [id number] left-handed male previously seen by Dr. Aleman with a history of stroke in 2017, hypertension, anxiety, chronic low back pain status post lumbar fusion in 2008 and 2009 presents for a follow-up for his stroke.  As per patient he had a stroke in August 2017 where he had left-sided numbness and mild weakness as well as loss of peripheral field of vision in the left eye.  His blood pressure was extremely high at the time.  Was admitted to [facility] where he was found to have a PFO that was subsequently closed.  Has been taking Plavix and Lipitor 20 mg since then.  Then in October 2018 patient had another brief episode of right-sided weakness.  He had a CT head that was normal, CT angiogram of the head and neck that did not show any significant intra-or extracranial stenosis and a MRI brain that showed a chronic right occipital infarct but no acute abnormalities.  Patient states that even now he feels slightly weak on the right but he also attributes it to the fact that he is left-handed and  does not use his right hand frequently.  Even his left arm feels stiff and weak since the stroke and his thumb twitches all the time.  He was on muscle relaxants but does not take anything currently.  He has also been having trouble with fine motor skills but stopped getting PT a while back.  Patient was smoking but quit entirely in 2017 after the stroke.  Of note-I personally reviewed his MRI brain and Dr. Aleman's notes.    The following portions of the patient's history were reviewed today and updated as of 02/11/2020  : allergies, current medications, past family history, past medical history, past social history, past surgical history and problem list  These document will be scanned to patient's chart.      Current Outpatient Medications:     amantadine (SYMMETREL) 100 MG tablet, , Disp: , Rfl:     amLODIPine (NORVASC) 10 MG tablet, Take 1 tablet by mouth Daily., Disp: , Rfl:     atorvastatin (LIPITOR) 20 MG tablet, TAKE 1 TABLET BY MOUTH DAILY, Disp: 90 tablet, Rfl: 2    clopidogrel (PLAVIX) 75 MG tablet, Take 1 tablet by mouth Daily., Disp: , Rfl:     Diclofenac Sodium (VOLTAREN) 1 % gel gel, APPLY 2 GRAMS TOPICALLY TO THE AFFECTED AREA 2 TO 3 TIMES DAILY AS NEEDED FOR PAIN, Disp: , Rfl:     famotidine (PEPCID) 20 MG tablet, Take 1 tablet by mouth Daily., Disp: , Rfl:     gabapentin (NEURONTIN) 400 MG capsule, Take 1 capsule 3 times a day by oral route., Disp: , Rfl:     hydrOXYzine (ATARAX) 25 MG tablet, Take 1 tablet by mouth 2 (Two) Times a Day., Disp: , Rfl:     lisinopril (PRINIVIL,ZESTRIL) 10 MG tablet, Take 1 tablet by mouth Daily., Disp: , Rfl:     nortriptyline (PAMELOR) 25 MG capsule, Take 2 capsules by mouth Every Night., Disp: 60 capsule, Rfl: 11    oxyCODONE-acetaminophen (PERCOCET)  MG per tablet, , Disp: , Rfl:     tadalafil (Cialis) 20 MG tablet, Take 1 tablet by mouth Daily As Needed for Erectile Dysfunction., Disp: 30 tablet, Rfl: 1    tamsulosin (FLOMAX) 0.4 MG capsule 24 hr capsule,  "Take 1 capsule by mouth Daily., Disp: 30 capsule, Rfl: 1    timolol (TIMOPTIC) 0.5 % ophthalmic solution, Administer 1 drop to both eyes 2 (Two) Times a Day., Disp: , Rfl:     amLODIPine (NORVASC) 5 MG tablet, Take 1 tablet by mouth Daily. (Patient not taking: Reported on 1/23/2025), Disp: , Rfl:    Past Medical History:   Diagnosis Date    Anxiety     Anxiety and depression     Arthritis     Cerebrovascular disease     DDD (degenerative disc disease), cervical     Depression     Erectile dysfunction     Glaucoma [id number]    Hormone disorder     Hyperlipidemia     Hypertension     Injury of back     Periarthritis of shoulder     Peripheral neuropathy 8/27)2017    Stroke    Rotator cuff syndrome     Stroke       Past Surgical History:   Procedure Laterality Date    BACK SURGERY  2000    L5-S1    BACK SURGERY  2009    L5-S1 replace hardware    HERNIA REPAIR        Family History   Problem Relation Age of Onset    Hypertension Mother     Hypertension Father       Social History     Socioeconomic History    Marital status: Single   Tobacco Use    Smoking status: Never     Passive exposure: Never    Smokeless tobacco: Never   Vaping Use    Vaping status: Never Used   Substance and Sexual Activity    Alcohol use: No    Drug use: No    Sexual activity: Yes     Partners: Female     Birth control/protection: None     Review of Systems   Constitutional: Positive for appetite change (Has not had a very big appetite, lost some weight).   Neurological: Positive for headache (HA's off and on, occur on the right side).   All other systems reviewed and are negative.      Objective:    /88   Pulse 77   Ht 182.9 cm (72.01\")   Wt 91.3 kg (201 lb 3.2 oz)   SpO2 95%   BMI 27.28 kg/m²     Neurology Exam:    General apperance: NAD.  No tenderness to palpation of his occipital region    Mental status: Alert, awake and oriented to time place and person.  Could tell me the month, year and his ZIP Code    Recent and Remote memory: " Intact.    Attention span and Concentration: Normal.     Language and Speech: Intact- No dysarthria.    Fluency, Naming , Repitition and Comprehension:  Intact    Cranial Nerves:   CN II: Visual fields are full on the right.  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 Fundi - Normal, No papillederma, Pupils - CRUZ  CN III, IV and VI: Extraocular movements are intact. Normal saccades.   CN V: Facial sensation is intact.   CN VII: Muscles of facial expression reveal no asymmetry. Intact.   CN VIII: Hearing is intact. Whispered voice intact.   CN IX and X: Palate elevates symmetrically. Intact  CN XI: Shoulder shrug is intact.   CN XII: Tongue is midline without evidence of atrophy or fasciculation.     Ophthalmoscopic exam of optic disc-normal    Motor:-    Right UE muscle strength 5/5. Normal tone.     Left UE muscle strength 5/5 with mildly increased tone.     Right LE muscle strength5/5. Normal tone.     Left LE muscle strength 5/5. Normal tone.      Sensory: Normal light touch, vibration and pinprick sensation bilaterally.    DTRs: 2+ bilaterally in upper and lower extremities.    Babinski: Negative bilaterally.    Co-ordination: Normal finger-to-nose, heel to shin B/L.    Rhomberg: Negative.    Gait: Normal.  Could do tandem walking    Cardiovascular: Regular rate and rhythm without murmur, gallop or rub.    Assessment and Plan:  1. Sequelae, post-stroke  Patient had a right occipital stroke in 2017 most likely due to small vessel disease.  Also had a PFO that was subsequently closed.  In 2018 he also had a TIA.  I have asked him to continue Plavix and Lipitor 20 mg daily.  Goal LDL of less than 100.   Maintain blood pressure less than 130/90  I will give him another PT referral for left-sided weakness/stiffness.  He should continue exercising    2.  Tension headache  He can continue taking nortriptyline 25 to 50 mg nightly for headaches (has enough refills)  He also takes gabapentin 400 mg twice  daily for muscle spasms         Return in about 9 months (around 10/23/2025).     I spent 25 minutes out of it 20 minutes were spent face-to-face    Lexie Broderick MD

## 2025-01-29 ENCOUNTER — OFFICE VISIT (OUTPATIENT)
Dept: UROLOGY | Facility: CLINIC | Age: 55
End: 2025-01-29
Payer: COMMERCIAL

## 2025-01-29 ENCOUNTER — TRANSCRIBE ORDERS (OUTPATIENT)
Dept: PHYSICAL THERAPY | Facility: HOSPITAL | Age: 55
End: 2025-01-29
Payer: COMMERCIAL

## 2025-01-29 ENCOUNTER — HOSPITAL ENCOUNTER (OUTPATIENT)
Dept: PHYSICAL THERAPY | Facility: HOSPITAL | Age: 55
Setting detail: THERAPIES SERIES
Discharge: HOME | End: 2025-01-29
Payer: COMMERCIAL

## 2025-01-29 VITALS
WEIGHT: 190 LBS | OXYGEN SATURATION: 97 % | BODY MASS INDEX: 25.73 KG/M2 | HEIGHT: 72 IN | SYSTOLIC BLOOD PRESSURE: 126 MMHG | HEART RATE: 59 BPM | DIASTOLIC BLOOD PRESSURE: 72 MMHG

## 2025-01-29 DIAGNOSIS — R29.898 WEAKNESS OF LEFT LOWER EXTREMITY: ICD-10-CM

## 2025-01-29 DIAGNOSIS — R39.9 LOWER URINARY TRACT SYMPTOMS (LUTS): ICD-10-CM

## 2025-01-29 DIAGNOSIS — R29.818 FINE MOTOR IMPAIRMENT: Primary | ICD-10-CM

## 2025-01-29 DIAGNOSIS — I69.393 ATAXIA DUE TO OLD CEREBRAL INFARCTION: Primary | ICD-10-CM

## 2025-01-29 DIAGNOSIS — R27.0 ATAXIA OF LEFT UPPER EXTREMITY: ICD-10-CM

## 2025-01-29 DIAGNOSIS — N52.9 ERECTILE DYSFUNCTION, UNSPECIFIED ERECTILE DYSFUNCTION TYPE: Primary | ICD-10-CM

## 2025-01-29 DIAGNOSIS — R29.898 FINE MOTOR IMPAIRMENT: Primary | ICD-10-CM

## 2025-01-29 PROCEDURE — 97162 PT EVAL MOD COMPLEX 30 MIN: CPT | Performed by: PHYSICAL THERAPIST

## 2025-01-29 PROCEDURE — 97110 THERAPEUTIC EXERCISES: CPT | Performed by: PHYSICAL THERAPIST

## 2025-01-29 RX ORDER — LATANOPROST 50 UG/ML
1 SOLUTION/ DROPS OPHTHALMIC DAILY
COMMUNITY
Start: 2025-01-27

## 2025-01-29 RX ORDER — TAMSULOSIN HYDROCHLORIDE 0.4 MG/1
1 CAPSULE ORAL DAILY
Qty: 90 CAPSULE | Refills: 1 | Status: SHIPPED | OUTPATIENT
Start: 2025-01-29

## 2025-01-29 RX ORDER — SILDENAFIL CITRATE 20 MG/1
100 TABLET ORAL DAILY PRN
Qty: 30 TABLET | Refills: 3 | Status: SHIPPED | OUTPATIENT
Start: 2025-01-29

## 2025-01-29 NOTE — THERAPY EVALUATION
Physical Therapy Initial Evaluation and Plan of Care      Patient: Claude Masterson   : 1970  Diagnosis/ICD-10 Code:      ICD-10-CM ICD-9-CM   1. 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 Weakness of left lower extremity  R29.898 729.89      Referring practitioner: Lexie Broderick MD  Today's Date: 2025  [facility]  [address]         Subjective Questionnaire: n/a    Eval Complexity: moderate  TU.34 sec  10 M: 8.81 sec  FGA:   KOCH/56    Subjective Evaluation    History of Present Illness  Mechanism of injury: Pt returns for PT services secondary to residual L sided weakness and ataxia from CVA.  Pt reports that since being here for therapy services, he has been going to the [facility] and doing pool and land exercises, 4x/wk.  His MD reported that she sees that he has been improving but would benefit from additional therapy services.  Pt reports still having ataxia with L UE and LE.  Pt has not had any falls. Pt is requesting OT services for L hand.  Pt is having foot surgery on right foot 25 for curling toes.     Per MD note:  Current visit-patient states he has been doing well since his last visit.  He received PT for his balance/mild left-sided weakness and has now been exercising in addition to going to the pool at the .  He also uses [employer] which has helped with his fine motor skills of the left hand.  Continues to take both Plavix and Lipitor 20 mg daily.  His dose of gabapentin was increased by pain management to 400 mg 3 times daily but he has only been taking a dose of twice a day for muscle spasms in his left leg that started after his stroke as well as back pain.  His headaches are also better controlled even though he takes nortriptyline only sporadically (sometimes sleeps late at night due to watching ball games and forgets to take the nortriptyline).     Assessment and Plan:  1. Sequelae, post-stroke  Patient had a  right occipital stroke in 2017 most likely due to small vessel disease.  Also had a PFO that was subsequently closed.  In 2018 he also had a TIA.  I have asked him to continue Plavix and Lipitor 20 mg daily.  Goal LDL of less than 100.   Maintain blood pressure less than 130/90  I will give him another PT referral for left-sided weakness/stiffness.  He should continue exercising     2.  Tension headache  He can continue taking nortriptyline 25 to 50 mg nightly for headaches (has enough refills)  He also takes gabapentin 400 mg twice daily for muscle spasms        Patient Occupation: retired Quality of life: good    Pain  Current pain ratin  Location: L knee    Social Support  Lives in: one-story house  Lives with: significant other    Hand dominance: left (use right secondary to ataxia)    Treatments  Previous treatment: physical therapy  Patient Goals  Patient goals for therapy: improved balance and increased strength  Patient goal: strengthen B LEs           Objective          Strength/Myotome Testing     Left Hip   Planes of Motion   Flexion: 4  Extension: 4  Abduction: 4-  Adduction: 4-    Right Hip   Planes of Motion   Flexion: 5  Extension: 4+  Abduction: 4+  Adduction: 4+    Left Knee   Flexion: 4+  Extension: 4+    Right Knee   Flexion: 5  Extension: 5    Left Ankle/Foot   Dorsiflexion: 4  Plantar flexion: 4    Right Ankle/Foot   Dorsiflexion: 5  Plantar flexion: 5    Ambulation     Ambulation: Stairs   Ascend stairs: independent  Pattern: reciprocal  Railings: without rails  Descend stairs: independent  Pattern: reciprocal  Railings: without rails    Additional Stairs Ambulation Details  Pt demonstrates decreased control with L LE stepping up and down onto/off steps secondary to ataxia    Observational Gait   Increased left swing time. Decreased left stance time.   Base of support: normal     General Comments     Hip Comments   L hip adductor tone: 1 mod jacek    Exercise 1  Exercise Name 1: Educated  pt that we will add stretching to HEP along with fwd stepping up onto/off step in pool.  Time 1: 10 min         PT Neuro         Assessment & Plan       Assessment  Impairments: abnormal coordination, abnormal gait, abnormal muscle tone, activity intolerance, impaired balance, impaired physical strength and lacks appropriate home exercise program   Functional limitations: walking (Stair climbing  )  Assessment details: Pt presents with evolving symptoms following old CVA.  Pt has been exercises at the [facility] and demonstrates improvement with L LE ataxia and overall functional mobility.  Pt to benefit from skilled PT services to improve gait, L LE control/placement and overall functional mobility up until he has foot surgery.  Pt would like OT services for L UE/hand ataxia.    Prognosis: good    Goals  Plan Goals: STG (4 visits)  1. Patient to improve KOCH balance score to >/= 54 /56 to decrease client's risk of falls.  2. Patient to improve FGA score to >/= 27 /30 to decrease client's risk of falls.      LTG (8 visits)  1. Patient to improve KOCH balance score to >/= 56 /56 to decrease client's risk of falls.  2. Patient to perform TUG within 8.5 sec without LOB for improved functional mobility.  3. Patient to ambulate 10 meters without AD within 8 sec without LOB for improved gait nishant and functional mobility.  4. Patient to improve FGA score to >/= 30 /30 to decrease client's risk of falls.  5. Patient to be I with HEP.    Plan  Therapy options: will be seen for skilled therapy services  Planned modality interventions: electrical stimulation/Russian stimulation  Planned therapy interventions: fine motor coordination training, gait training, functional ROM exercises, home exercise program, neuromuscular re-education, strengthening, stretching, therapeutic activities, abdominal trunk stabilization, balance/weight-bearing training and motor coordination training  Frequency: 2x week  Duration in visits: 8  Treatment  plan discussed with: patient  Plan details: Patient will be seen 2x/wk x 8 visits with treatment to include strengthening, stretching, functional e-stim therapy, neuromuscular re-education, balance, gait and endurance training.       Therapy Charges for Today       Code Description Service Date Service Provider Modifiers Qty    [id number]  PT THER PROC EA 15 MIN 1/29/2025 Sonia Us, PT GP 1    [id number] HC PT EVAL MOD COMPLEXITY 3 1/29/2025 Sonia Us, PT GP 1               PT SIGNATURE: Sonia Us, PT   DATE TREATMENT INITIATED: 1/29/2025    Initial Certification  Certification Period: 1/29/2025thru4/28/2025  I certify that the therapy services are furnished while this patient is under my care.  The services outlined above are required by this patient, and will be reviewed every 90 days.     PHYSICIAN: Lexie Broderick MD  NPI: [id number]            DATE:                               Please sign and return via fax to [phone].. Thank you, [facility].

## 2025-01-29 NOTE — PROGRESS NOTES
Follow Up Office Visit      Patient Name: Claude Masterson  : 1970   MRN: [id number]     Chief Complaint:    Chief Complaint   Patient presents with    Erectile Dysfunction     Pt states that the Cialis helps him get an erection but not keep one very long.    LUTS     Pt states that symptoms are improving, but he feels as though he still has some urine in his bladder after urinating. He also states he is out of Flomax       Referring Provider: No ref. provider found    History of Present Illness: Claude Masterson is a 54 y.o. male who presents today for follow up of erectile dysfunction and lower urinary tract symptoms.     He reports urinary frequency and nocturia x1.     Previously underwent anatomical evaluation with Dr. Cruz 24; he is planning to follow-up with Dr. Cruz on 3/11/2025 to further assess urinary symptoms and possibly discuss bladder outlet procedure.    He is taking Flomax 0.4mg daily.   IPSS 8.     He has been taking Cialis 20 mg daily. He is able obtain erections, however he is losing erections during intercourse.   Subjective      Review of System: Review of Systems   Genitourinary:         Erectile dysfunction. Nocturia.    All other systems reviewed and are negative.     I have reviewed the ROS documented by my clinical staff, I have updated appropriately and I agree. REGLA Tripathi    I have reviewed and the following portions of the patient's history were updated as appropriate: past family history, past medical history, past social history, past surgical history and problem list.    Medications:     Current Outpatient Medications:     amantadine (SYMMETREL) 100 MG tablet, , Disp: , Rfl:     amLODIPine (NORVASC) 10 MG tablet, Take 1 tablet by mouth Daily., Disp: , Rfl:     atorvastatin (LIPITOR) 20 MG tablet, TAKE 1 TABLET BY MOUTH DAILY, Disp: 90 tablet, Rfl: 2    clopidogrel (PLAVIX) 75 MG tablet, Take 1 tablet by mouth Daily., Disp: , Rfl:     Diclofenac Sodium  (VOLTAREN) 1 % gel gel, APPLY 2 GRAMS TOPICALLY TO THE AFFECTED AREA 2 TO 3 TIMES DAILY AS NEEDED FOR PAIN, Disp: , Rfl:     famotidine (PEPCID) 20 MG tablet, Take 1 tablet by mouth Daily., Disp: , Rfl:     gabapentin (NEURONTIN) 400 MG capsule, Take 1 capsule 3 times a day by oral route., Disp: , Rfl:     hydrOXYzine (ATARAX) 25 MG tablet, Take 1 tablet by mouth 2 (Two) Times a Day., Disp: , Rfl:     latanoprost (XALATAN) 0.005 % ophthalmic solution, Administer 1 drop to both eyes Daily., Disp: , Rfl:     lisinopril (PRINIVIL,ZESTRIL) 10 MG tablet, Take 1 tablet by mouth Daily., Disp: , Rfl:     nortriptyline (PAMELOR) 25 MG capsule, Take 2 capsules by mouth Every Night., Disp: 60 capsule, Rfl: 11    oxyCODONE-acetaminophen (PERCOCET)  MG per tablet, , Disp: , Rfl:     tamsulosin (FLOMAX) 0.4 MG capsule 24 hr capsule, Take 1 capsule by mouth Daily., Disp: 90 capsule, Rfl: 1    timolol (TIMOPTIC) 0.5 % ophthalmic solution, Administer 1 drop to both eyes 2 (Two) Times a Day., Disp: , Rfl:     amLODIPine (NORVASC) 5 MG tablet, Take 1 tablet by mouth Daily. (Patient not taking: Reported on 1/23/2025), Disp: , Rfl:     sildenafil (REVATIO) 20 MG tablet, Take 5 tablets by mouth Daily As Needed (erectile dysfunction)., Disp: 30 tablet, Rfl: 3    Allergies:   Allergies   Allergen Reactions    Penicillins Hives    Sulfa Antibiotics Hives     Unsure if it was a specific sulfa drug or all sulfa drugs    Hydrocodone-Acetaminophen Rash       IPSS Questionnaire (AUA-7):  Over the past month…    1)  Incomplete Emptying:       How often have you had a sensation of not emptying you had the sensation of not emptying your bladder completely after you finished urinating?  2 - Less than half the time   2)  Frequency:       How often have you had the urinate again less than two hours after you finished urinating?  1 - Less than 1 time in 5   3)  Intermittency:       How often have you found you stopped and started again several  "times when you urinated?   2 - Less than half the time   4) Urgency:      How often have you found it difficult to postpone urination?  1 - Less than 1 time in 5   5) Weak Stream:      How often have you had a weak urinary stream?  1 - Less than 1 time in 5   6) Straining:       How often have you had to push or strain to begin urination?  0 - Not at all   7) Nocturia:      How many times did you most typically get up to urinate from the time you went to bed at night until the time you got up in the morning?  1 - 1 time   Total Score:  8   The International Prostate Symptom Score (IPSS) is used to screen, diagnose, track symptoms of benign prostatic hyperplasia (BPH).   0-7 (Mild Symptoms) 8-19 (Moderate) 20-35 (Severe)   Quality of Life (QoL):  If you were to spend the rest of your life with your urinary condition just the way it is now, how would you feel about that? 3-Mixed   Urine Leakage (Incontinence) 0-No Leakage       Objective     Physical Exam:   Vital Signs:   Vitals:    01/29/25 1053   BP: 126/72   Pulse: 59   SpO2: 97%   Weight: 86.2 kg (190 lb)   Height: 182.9 cm (72\")     Body mass index is 25.77 kg/m².     Physical Exam  Vitals and nursing note reviewed.   Constitutional:       Appearance: Normal appearance.   HENT:      Head: Normocephalic and atraumatic.      Nose: Nose normal.      Mouth/Throat:      Mouth: Mucous membranes are moist.   Eyes:      Pupils: Pupils are equal, round, and reactive to light.   Pulmonary:      Effort: Pulmonary effort is normal.   Abdominal:      General: Abdomen is flat.      Palpations: Abdomen is soft.   Musculoskeletal:         General: Normal range of motion.      Cervical back: Normal range of motion.   Skin:     General: Skin is warm and dry.      Capillary Refill: Capillary refill takes less than 2 seconds.   Neurological:      General: No focal deficit present.      Mental Status: He is alert.   Psychiatric:         Mood and Affect: Mood normal.       Labs: "   Brief Urine Lab Results  (Last result in the past 365 days)        Color   Clarity   Blood   Leuk Est   Nitrite   Protein   CREAT   Urine HCG        11/20/24 1135 Yellow   Clear   Negative   Negative   Negative   Negative                        Lab Results   Component Value Date    GLUCOSE 88 11/20/2024    CALCIUM 8.6 11/20/2024     11/20/2024    K 3.9 11/20/2024    CO2 22.0 11/20/2024     11/20/2024    BUN 7 11/20/2024    CREATININE 1.06 11/20/2024    BCR 6.6 (L) 11/20/2024    ANIONGAP 11.0 11/20/2024       Lab Results   Component Value Date    WBC 13.04 (H) 11/20/2024    HGB 13.4 11/20/2024    HCT 40.0 11/20/2024    MCV 88.5 11/20/2024     11/20/2024       Images:   XR Foot 3+ View Right    Result Date: 12/16/2024  1. Hindfoot valgus and pes planus. 2. Healed osteotomies of the first metatarsal and great toe proximal phalanx. CRITICAL RESULT:   No. COMMUNICATION: Per this written report. Drafted by Viral Meade MD on 12/16/2024 2:14 PM Final report signed by Viral Meade MD on 12/16/2024 2:16 PM    CT Angiogram Chest    Result Date: 11/20/2024  Impression: 1.No evidence of pulmonary embolism. 2.Mild central and lower lung atelectasis. 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 4.Abdominal postoperative changes. Correlate with history. Electronically Signed: Andrea Morocho MD  11/20/2024 12:31 PM EST  Workstation ID: [id number]    CT Abdomen Pelvis With Contrast    Result Date: 11/20/2024  Impression: 1.No evidence of pulmonary embolism. 2.Mild central and lower lung atelectasis. 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 4.Abdominal postoperative changes. Correlate with history. Electronically Signed: Andrea Morocho MD  11/20/2024 12:31 PM EST  Workstation ID: [id number]    XR Chest 1 View    Result Date: 11/20/2024  Impression: Widened mediastinum with more prominent aortic contours. CT angiogram chest recommended to  further assess. Electronically Signed: Andrea Morocho MD  11/20/2024 11:14 AM EST  Workstation ID: [id number]      Measures:   Tobacco:   Claude Masterson  reports that he has never smoked. He has never been exposed to tobacco smoke. He has never used smokeless tobacco.        Urine Incontinence: Patient reports that he is not currently experiencing any symptoms of urinary incontinence.      Assessment / Plan      Assessment/Plan:   54 y.o. male who presented today for follow up of lower urinary tract symptoms and erectile dysfunction.  He is planning to follow-up with Dr. Cruz on 3/11/2025 to further discuss possible bladder outlet procedure.  He is currently managed on Flomax.  He is interested in trying Viagra.  He will stop Cialis and begin Viagra 20 mg as needed up to 100 mg for erectile dysfunction.  We did briefly discuss intracavernosal injections in the future if he fails Viagra.  We discussed to notify our office if he develops erection lasting more than 3 hours we also discussed risk of lightheadedness or dizziness with medication.  He is agreeable plan of care.    Diagnoses and all orders for this visit:    1. Erectile dysfunction, unspecified erectile dysfunction type (Primary)  -     sildenafil (REVATIO) 20 MG tablet; Take 5 tablets by mouth Daily As Needed (erectile dysfunction).  Dispense: 30 tablet; Refill: 3    2. Lower urinary tract symptoms (LUTS)  -     tamsulosin (FLOMAX) 0.4 MG capsule 24 hr capsule; Take 1 capsule by mouth Daily.  Dispense: 90 capsule; Refill: 1         Follow Up:   Return for 03/11 previously scheduled with Dr. Cruz.    I spent approximately 20 minutes providing clinical care for this patient; including review of patient's chart and provider documentation, face to face time spent with patient in examination room (obtaining history, performing physical exam, discussing diagnosis and management options), placing orders, and completing patient documentation.     Shayy Hill,  REGLA  [facility]

## 2025-01-31 ENCOUNTER — APPOINTMENT (OUTPATIENT)
Dept: PHYSICAL THERAPY | Facility: HOSPITAL | Age: 55
End: 2025-01-31
Payer: COMMERCIAL

## 2025-02-04 ENCOUNTER — HOSPITAL ENCOUNTER (OUTPATIENT)
Dept: PHYSICAL THERAPY | Facility: HOSPITAL | Age: 55
Setting detail: THERAPIES SERIES
Discharge: HOME | End: 2025-02-04
Payer: COMMERCIAL

## 2025-02-04 ENCOUNTER — HOSPITAL ENCOUNTER (OUTPATIENT)
Dept: OCCUPATIONAL THERAPY | Facility: HOSPITAL | Age: 55
Setting detail: THERAPIES SERIES
Discharge: HOME | End: 2025-02-04
Payer: COMMERCIAL

## 2025-02-04 DIAGNOSIS — R26.89 BALANCE PROBLEM: ICD-10-CM

## 2025-02-04 DIAGNOSIS — I69.393 ATAXIA DUE TO OLD CEREBRAL INFARCTION: Primary | ICD-10-CM

## 2025-02-04 DIAGNOSIS — Z86.73 HISTORY OF STROKE: Primary | ICD-10-CM

## 2025-02-04 PROCEDURE — 97110 THERAPEUTIC EXERCISES: CPT | Performed by: PHYSICAL THERAPIST

## 2025-02-04 PROCEDURE — 97112 NEUROMUSCULAR REEDUCATION: CPT | Performed by: PHYSICAL THERAPIST

## 2025-02-04 PROCEDURE — 97166 OT EVAL MOD COMPLEX 45 MIN: CPT

## 2025-02-04 NOTE — THERAPY TREATMENT NOTE
"Physical Therapy Daily Note      Patient: Claude Masterson   : 1970  MRN: [id number]   Diagnosis/ICD-10 Code:      ICD-10-CM ICD-9-CM   1. 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 Balance problem  R26.89 781.99      Referring practitioner: Lexie Broderick MD  Today's Date: 2025      Subjective:   Patient reports: \"I'm ok.\"  Pain: 0/10    Objective   See Exercise, Manual, and Modality Logs for complete treatment.    Exercise 1  Exercise Name 1: Elliptical B UE/LEs; level 10  Additional Comments: 2 min fwd/1 min bwd for 6 min total  Exercise 2  Exercise Name 2: Pt educated and performed pool HEP on land, see for details  Reps 2: 5  Additional Comments: VCing  Exercise 3  Exercise Name 3: Fwd and B sidestepping along blue foam beam; holding tandem along beam up to 20 sec  Additional Comments: blue foam beam; LOB up to 40% of the time      PT Neuro     Balance Skills Training  60027 -  PT Neuromuscular Reeducation Minutes: 30    Assessment & Plan       Assessment  Assessment details: Pt requires min A and VCing for pool HEP.  Pt has increased LOB with SLS activities and narrow UBALDO on unstable surfaces.  Pt requires VCing to slow down movements to work on ataxic control.  Pt to benefit from skilled PT services to improve overall functional mobility.    Plan  Plan details: Pt to continue with PT services to improve gait, balance, strength, and overall functional mobility.          Therapy Charges for Today       Code Description Service Date Service Provider Modifiers Qty    46883584880 HC PT NEUROMUSC RE EDUCATION EA 15 MIN 2025 Sonia Us, PT GP 2    39302546551 HC PT THER PROC EA 15 MIN 2025 Sonia Us, PT GP 1            Sonia Us PT  [place] License #: [id number]    Physical Therapist    "

## 2025-02-04 NOTE — THERAPY EVALUATION
Occupational Therapy Initial Evaluation     [facility]          [address]     Patient: Claude Masterson   : 1970  MRN: [id number]   Diagnosis/ICD-10 Code:      ICD-10-CM ICD-9-CM   1. 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 L sided weakness and ataxia. Ataxic movements in L hand have progressed from thumb to entire hand within the past month. He has been seeing OP OT/PT off and on since stroke. Indep w/ ADLs/IADLs. Requires assist for picking up heavy or hot items. History of multiple lumbar fusions  resulting in numbness in R side. [place] 4x[place]. History of glaucoma, managing w/ eye drops. Likes to fish and play baseball. Has difficulty with baiting hook and aiming baseball.      Quality of life: good    Pain  Current pain ratin  At best pain ratin    Social Support  Lives in: one-story house  Lives with: significant other    Hand dominance: left    Treatments  Current treatment: physical therapy  Patient Goals  Patient goal: Picking up items w/ L hand; holding drink and plate (Wants to be able to have better mobility in L hand)           Objective          Active Range of Motion   Left Shoulder   Normal active range of motion    Right Shoulder   Normal active range of motion    Left Elbow   Normal active range of motion    Right Elbow   Normal active range of motion    Left Wrist   Normal active range of motion    Right Wrist   Normal active range of motion    Additional Active Range of Motion Details  Moderate L hand/UE ataxic movement. L thumb ataxic movement, increase in ataxic mvmnt in entire L fingers starting the last month.     Strength/Myotome Testing     Left Shoulder     Planes of Motion   Flexion: 4-   Extension: 4-   Abduction: 4-    Adduction: 4-     Right Shoulder     Planes of Motion   Flexion: 5   Extension: 5   Abduction: 5   Adduction: 5     Left Elbow   Flexion: 4-  Extension: 4-    Right Elbow   Flexion: 5  Extension: 5    Left Wrist/Hand      (2nd hand position)     Trial 1: 70 lbs    Trial 2: 65 lbs    Trial 3: 79 lbs    Average: 71.33 lbs    Right Wrist/Hand      (2nd hand position)     Trial 1: 95 lbs    Trial 2: 100 lbs    Trial 3: 101 lbs    Average: 98.67 lbs        FDT:  Left  Trial 1: Unable to complete   Right  Trial 1 59.48 sec.   Penalties: 2        OT Neuro         Assessment & Plan       Assessment  Impairments: abnormal coordination, activity intolerance, impaired physical strength, lacks appropriate home exercise program and pain with function   Functional limitations: carrying objects and unable to perform repetitive tasks (Leisure activities- throwing baseball and threading fish hook  )  Assessment details: Pt is a 53 y/o m who presents to OP OT with increased ataxia in L arm and PMH of CVA, glaucoma and lumbar fusions. Pt demonstrates decreased independence, safety, and satisfaction with ADL/IADL tasks and engaging in desired occupations due to decreased FMC/FMC and strength. Pt would benefit from skilled occupational therapy services to address established goals as specified.   Prognosis: good    Goals  Plan Goals: Client will increase L   strength by 3 lbs by 8 wks to demonstrate improved strength and function for daily tasks.     Client will indep demonstrate understanding of hand FMC HEP to increase independence with ADL/IADL performance tasks by 4 wks.     Client will indep demonstrate understanding of deep pressure and tactile input HEP to improve L hand ataxia require for efficient hand movements by 4 weeks.       Client will increase accuracy with distal reaching and placing of items w/o dropping for carry over with tool use in 9/10 trials by 8 wks.    Client will improve unilateral UE  coordination with distal reaching and cross body placing of objects w/ appropriate force and control 9/10 trials by 8 wks.     Client will improve bilateral UE coordination with distal reaching and translating of objects w/o dropping 9/10 trials by 8 wks.     Client will improve in-hand FM skills of L hand through translation, shifting and rotation of objects without dropping 9/10 trials by 8 wks.      Client will be L with in-hand manipulation, translation and targeting skills for improving accuracy and speed with self-feeding including spearing, scooping and cutting/chopping food for home/community environments by 8 wks.    Client will independently demonstrate understanding of UB/LB dressing modifications to promote efficiency and safety w/ dressing tasks by 8 weeks.         Plan  Therapy options: will be seen for skilled therapy services  Planned modality interventions: thermotherapy (hydrocollator packs)  Planned therapy interventions: ADL retraining, fine motor coordination training, home exercise program, motor coordination training, strengthening, therapeutic activities, neuromuscular re-education, stretching, functional ROM exercises, manual therapy, body mechanics training and IADL retraining  Frequency: 1x week  Duration in visits: 8  Treatment plan discussed with: patient  Plan details: Will establish OT POC and goals to reflect pt needs and promote increased satisfaction and QOL. Will implement appropriate AE/DME needs, home/activity/environmental modifications, flexibility/ROM/stretching, balance training, NMR techniques, ADL/IADL retraining, FM/GMC training, and other interventions as needed.  CPT codes to include: 82402, 93477, 83514, 39948 and 51587         Eval Complexity: Moderate    OT SIGNATURE: TOÑITO Little, OTR/L  [place] License: [id number]  DATE TREATMENT INITIATED: 2/4/2025    Therapy Charges for Today       Code Description Service Date Service Provider Modifiers Qty    96381538817  OT EVAL  MOD COMPLEXITY 3 2/4/2025 Jennifer Boo, OT GO 1            Initial Certification Certification Period: 2/4/20258735tuxb5/4/2025  I certify that the therapy services are furnished while this patient is under my care.  The services outlined above are required by this patient, and will be reviewed every 90 days.     PHYSICIAN: Lexie Broderick MD  NPI: [id number]                                         DATE:     Please sign and return via fax to [phone].   Thank you,   [facility].

## 2025-02-06 ENCOUNTER — APPOINTMENT (OUTPATIENT)
Dept: PHYSICAL THERAPY | Facility: HOSPITAL | Age: 55
End: 2025-02-06
Payer: COMMERCIAL

## 2025-02-07 ENCOUNTER — HOSPITAL ENCOUNTER (OUTPATIENT)
Dept: OCCUPATIONAL THERAPY | Facility: HOSPITAL | Age: 55
Setting detail: THERAPIES SERIES
Discharge: HOME | End: 2025-02-07
Payer: COMMERCIAL

## 2025-02-07 ENCOUNTER — HOSPITAL ENCOUNTER (OUTPATIENT)
Dept: PHYSICAL THERAPY | Facility: HOSPITAL | Age: 55
Setting detail: THERAPIES SERIES
Discharge: HOME | End: 2025-02-07
Payer: COMMERCIAL

## 2025-02-07 DIAGNOSIS — I69.393 ATAXIA DUE TO OLD CEREBRAL INFARCTION: Primary | ICD-10-CM

## 2025-02-07 DIAGNOSIS — Z86.73 HISTORY OF STROKE: Primary | ICD-10-CM

## 2025-02-07 DIAGNOSIS — R26.89 BALANCE PROBLEM: ICD-10-CM

## 2025-02-07 PROCEDURE — 97112 NEUROMUSCULAR REEDUCATION: CPT | Performed by: PHYSICAL THERAPIST

## 2025-02-07 PROCEDURE — 97110 THERAPEUTIC EXERCISES: CPT | Performed by: PHYSICAL THERAPIST

## 2025-02-07 PROCEDURE — 97112 NEUROMUSCULAR REEDUCATION: CPT

## 2025-02-07 PROCEDURE — 97110 THERAPEUTIC EXERCISES: CPT

## 2025-02-07 PROCEDURE — 97530 THERAPEUTIC ACTIVITIES: CPT

## 2025-02-07 NOTE — THERAPY TREATMENT NOTE
"Occupational Therapy Daily Note      Patient: Claude Masterson   : 1970  MRN: [id number]   Diagnosis/ICD-10 Code:      ICD-10-CM ICD-9-CM   1. History of stroke  Z86.73 V12.54      Referring practitioner: Lexie Broderick MD  Today's Date: 2025    Subjective:   Patient reports: Feels like LUE is \"trying to wake up\". Tried to carry zhang w/ adjusted  and feels like it has helped. Still having trouble with getting regular cup to mouth d/t tremors.   Pain: No pain   Treatment has included: therapeutic exercise, neuromuscular re-education, and therapeutic activity    Objective   See Exercise, Manual, and Modality Logs for complete treatment.    PT Neuro          Assessment & Plan       Assessment  Assessment details: Pt presents to OP OT with continued need for therapy services to address L hand motor control and FMC/FMD. Deficits are limiting his participation in ADL/IADL and leisure activities. Will continue POC to progress toward established goals.      Plan  Plan details: Will continue OT intervention 1x/week to address deficits in order to progress toward goals.             Therapy Charges for Today       Code Description Service Date Service Provider Modifiers Qty    99779251866  OT NEUROMUSC RE EDUCATION EA 15 MIN 2025 Jennifer Boo OT GO 1    [id number]  OT THER PROC EA 15 MIN 2025 Jennifer Boo OT GO 1    [id number]  OT THERAPEUTIC ACT EA 15 MIN 2025 Jennifer Boo OT GO 1            Jennifer Long, OTD, OTR/L  [place] License #: [id number]  Occupational Therapist  "

## 2025-02-07 NOTE — THERAPY TREATMENT NOTE
"Physical Therapy Daily Note      Patient: Claude Masterson   : 1970  MRN: [id number]   Diagnosis/ICD-10 Code:      ICD-10-CM ICD-9-CM   1. 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 Balance problem  R26.89 781.99      Referring practitioner: Lexie Broderick MD  Today's Date: 2025      Subjective:   Patient reports: \"I can tell that my left leg is getting better.  I have foot surgery next Friday.\"  Pain: 0/10    Objective   See Exercise, Manual, and Modality Logs for complete treatment.    Exercise 1  Exercise Name 1: SciFit; level 10 with emphasis on control  Time 1: 3 min fwd/3 min bwd for 6 min total  Exercise 2  Exercise Name 2: ST balance with one foot on rolling stool and perform knee flex/ext; foot on stool with performing hip ext/intermal rotation with keeping trunk facing forward and then repeat with rotating trunk in line with stool  Reps 2: 10  Additional Comments: stool  Exercise 3  Exercise Name 3: Issued and performed HEP for B hip strengthening; fire hydrants and hip circles in quadruped  Reps 3: 10  Exercise 4  Exercise Name 4: St balance with one foot tapping cone and then heel tapping to the floor and then repeat  Reps 4: 10  Additional Comments: three cones; CGA  Exercise 5  Exercise Name 5: Stepping straddle and then fwd/bwd to dots on the floor in certain pattern  Reps 5: 5  Additional Comments: six dots on the floor       PT Neuro     Balance Skills Training  87951 -  PT Neuromuscular Reeducation Minutes: 25    Assessment & Plan       Assessment  Assessment details: Pt requires CGA/min A with standing dynamic balance with L LE motor planning.  Pt issued B hip strengthening exercises, see for details.  Pt demonstrates SLS stability with stool activity.  Pt to benefit from skilled PT services to improve overall functional mobility.    Plan  Plan details: Pt to continue with PT services to improve gait, balance, strength, and overall functional mobility.          Therapy " Charges for Today       Code Description Service Date Service Provider Modifiers Qty    96837275894 HC PT NEUROMUSC RE EDUCATION EA 15 MIN 2/7/2025 Sonia Us, PT GP 2    [id number] HC PT THER PROC EA 15 MIN 2/7/2025 Sonia Us, PT GP 1            Sonia Us, PT  [place] License #: [id number]    Physical Therapist

## 2025-02-11 ENCOUNTER — HOSPITAL ENCOUNTER (OUTPATIENT)
Dept: PHYSICAL THERAPY | Facility: HOSPITAL | Age: 55
Setting detail: THERAPIES SERIES
Discharge: HOME | End: 2025-02-11
Payer: COMMERCIAL

## 2025-02-11 ENCOUNTER — APPOINTMENT (OUTPATIENT)
Dept: PHYSICAL THERAPY | Facility: HOSPITAL | Age: 55
End: 2025-02-11
Payer: COMMERCIAL

## 2025-02-11 DIAGNOSIS — I69.393 ATAXIA DUE TO OLD CEREBRAL INFARCTION: Primary | ICD-10-CM

## 2025-02-11 DIAGNOSIS — R26.89 BALANCE PROBLEM: ICD-10-CM

## 2025-02-11 PROCEDURE — 97112 NEUROMUSCULAR REEDUCATION: CPT | Performed by: PHYSICAL THERAPIST

## 2025-02-11 PROCEDURE — 97110 THERAPEUTIC EXERCISES: CPT | Performed by: PHYSICAL THERAPIST

## 2025-02-11 NOTE — THERAPY TREATMENT NOTE
"Physical Therapy Daily Note      Patient: Claude Masterson   : 1970  MRN: [id number]   Diagnosis/ICD-10 Code:      ICD-10-CM ICD-9-CM   1. 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 Balance problem  R26.89 781.99      Referring practitioner: Lexie Broderick MD  Today's Date: 2025      Subjective:   Patient reports: \"I'm getting better.\"  Pain: 0/10    Objective   See Exercise, Manual, and Modality Logs for complete treatment.    Exercise 1  Exercise Name 1: Elliptical B UE/LEs; level 12  Additional Comments: 2 min fwd/1 min bwd for 6 min total  Exercise 2  Exercise Name 2: Dynamic balance with alternating fwd lunges with placing ball of foot on center of the BOSU with toss/catch ball on/off rebounder; B rotatonal lunge to BOSU with toss/catch ball on/off rebounder; hold lunge with toss/catch ball on/off rebounder  Reps 2: 12  Additional Comments: BOSU; rebounder 2# ball; CGA  Exercise 3  Exercise Name 3: ST balance tandem on belu foam beam and hold with UE reaching overhead and across midline; standing sideways on blue foam beam with EC and then holding balance with B UE reaching overhead and across midline  Reps 3: 12  Additional Comments: blue foam beam; LOB 40% of the time  Exercise 4  Exercise Name 4: St balance on rocker board R/L and fwd/bwd with grabbing clips and placing them overhead and across midline on tband  Reps 4: 10  Additional Comments: rocker board; tband; clips; CGA      PT Neuro     Balance Skills Training  26523 -  PT Neuromuscular Reeducation Minutes: 30    Assessment & Plan       Assessment  Assessment details: Pt has increased LOB with standing tandem on beam and reaching clips with L UE secondary to ataxia.  Pt has difficulty with performing 3-jaw ramses gripping clips with L hand.  Pt demonstrates increased B toeing out with lunges to BOSU secondary to instability.  Pt to benefit from one final visit this week and then discharge secondary to surgery.      Plan  Plan " details: Pt to continue with PT services to improve gait, balance, strength, and overall functional mobility for one final visit.          Therapy Charges for Today       Code Description Service Date Service Provider Modifiers Qty    34318468866  PT NEUROMUSC RE EDUCATION EA 15 MIN 2/11/2025 Sonia Us, PT GP 2    [id number]  PT THER PROC EA 15 MIN 2/11/2025 Sonia Us, PT GP 1            JULIÁN [place] License #: [id number]    Physical Therapist

## 2025-02-13 ENCOUNTER — HOSPITAL ENCOUNTER (OUTPATIENT)
Dept: PHYSICAL THERAPY | Facility: HOSPITAL | Age: 55
Setting detail: THERAPIES SERIES
Discharge: HOME | End: 2025-02-13
Payer: COMMERCIAL

## 2025-02-13 DIAGNOSIS — I69.393 ATAXIA DUE TO OLD CEREBRAL INFARCTION: Primary | ICD-10-CM

## 2025-02-13 DIAGNOSIS — R26.89 BALANCE PROBLEM: ICD-10-CM

## 2025-02-13 PROCEDURE — 97110 THERAPEUTIC EXERCISES: CPT | Performed by: PHYSICAL THERAPIST

## 2025-02-13 PROCEDURE — 97116 GAIT TRAINING THERAPY: CPT | Performed by: PHYSICAL THERAPIST

## 2025-02-13 PROCEDURE — 97112 NEUROMUSCULAR REEDUCATION: CPT | Performed by: PHYSICAL THERAPIST

## 2025-02-13 NOTE — PROGRESS NOTES
"PT Discharge Note        Patient: Claude Masterson   : 1970  MRN: [id number]   Diagnosis/ICD-10 Code:      ICD-10-CM ICD-9-CM   1. 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 Balance problem  R26.89 781.99      Referring practitioner: Lexie Broderick MD  Today's Date: 2025    Subjective:   Claude Masterson reports: \"I'll check back in around the beginning of April after my foot surgery.\"  Subjective Questionnaire: n/a  Clinical Progress: improved  Home Program Compliance: Yes  Treatment has included: therapeutic exercise, neuromuscular re-education, and gait training    Subjective   Objective     Exercise 1  Exercise Name 1: Elliptical B UE/LEs; level 12  Additional Comments: 2 min fwd/1 min bwd for 6 min total  Exercise 2  Exercise Name 2: Re-assessment completed, see for details  Exercise 3  Exercise Name 3: Discharge completed, see for details  Exercise 4  Exercise Name 4: Agility ladder with walking fwd and bwd with one foot in each square; fwd walking with one foot in every other square; sidestepping with fwd/bwd stepping along ladder; fwd straddle stepping along ladder with each LE leading  Reps 4: 65 ft x 2  Additional Comments: agility ladder; SBA/CGA       Functional Testing  Outcome Measure Options: 10 Meter Walk, Koch Balance, FGA (Functional Gait Assessment), Timed Up and Go (TUG)      TU.67 sec  10 M: 7.50 sec  FGA: 25/30  KOCH/56    PT Neuro    Balance Skills Training  59528 -  PT Neuromuscular Reeducation Minutes: 15    Assessment & Plan       Assessment  Assessment details: Pt met LTG for 10 meter and TUG and improved KOCH and FGA.  Pt will be discharged today secondary to foot surgery.  Pt to return for PT services once cleared to participate following foot surgery.    Goals  Plan Goals: STG (4 visits)  1. Patient to improve KOCH balance score to >/= 54 /56 to decrease client's risk of falls. PARTIALLY MET  2. Patient to improve FGA score to >/= 27 /30 to decrease " client's risk of falls. PARTIALLY MET        LTG (8 visits)  1. Patient to improve KOCH balance score to >/= 56 /56 to decrease client's risk of falls. NOT MET  2. Patient to perform TUG within 8.5 sec without LOB for improved functional mobility. MET  3. Patient to ambulate 10 meters without AD within 8 sec without LOB for improved gait nishant and functional mobility. MET  4. Patient to improve FGA score to >/= 30 /30 to decrease client's risk of falls. NOT MET  5. Patient to be I with HEP. MET      Plan  Plan details: Pt will be discharged from PT services for foot surgery and return when cleared.        Progress toward previous goals: Partially Met      Recommendations: Discharge  Timeframe: n/a  Prognosis to achieve goals: n/a  Therapy Charges for Today       Code Description Service Date Service Provider Modifiers Qty    [id number]  PT NEUROMUSC RE EDUCATION EA 15 MIN 2/13/2025 Sonia Us, PT GP 1    [id number]  PT THER PROC EA 15 MIN 2/13/2025 Sonia Us, PT GP 1    [id number] HC GAIT TRAINING EA 15 MIN 2/13/2025 Sonia Us, PT GP 1            PT Signature: JULIÁN Redmond License #: [id number]

## 2025-03-11 ENCOUNTER — TELEPHONE (OUTPATIENT)
Dept: UROLOGY | Facility: CLINIC | Age: 55
End: 2025-03-11

## 2025-03-11 NOTE — TELEPHONE ENCOUNTER
Caller: Claude Masterson    Relationship: Self    What was the call regarding: PT CALL TO CANCEL SAME DAY APPT PRIOR TO APPT TIME DUE TO COURT.  R/S TO NET AVAIL OF

## 2025-03-24 ENCOUNTER — TELEPHONE (OUTPATIENT)
Dept: NEUROLOGY | Facility: CLINIC | Age: 55
End: 2025-03-24
Payer: COMMERCIAL

## 2025-03-24 RX ORDER — BACLOFEN 10 MG/1
10 TABLET ORAL 3 TIMES DAILY
Qty: 90 TABLET | Refills: 3 | Status: SHIPPED | OUTPATIENT
Start: 2025-03-24

## 2025-03-24 NOTE — TELEPHONE ENCOUNTER
PER DR DOSS, PATIENT IS TO BUMP UP HIS GABAPENTIN TO 3 X DAY FOR 10 DAYS AND TO CALL US. IF NOT BETTER SHE WILL CALL IN BACLOFEN. PATIENT SAID HE IS ALREADY TAKING IT 3 TIMES A DAY AND WANTED DR PAREDES TO BE AWARE OF HIS DOSAGE.     INFORMED HIM WE WILL GET BACK TO HIM AFTER UPDATING DR DOSS.

## 2025-03-24 NOTE — TELEPHONE ENCOUNTER
Notified patient of new medication called into [facility] and he will update us in 10 days of how he is  doing.

## 2025-03-24 NOTE — TELEPHONE ENCOUNTER
Caller: Claude Masterson    Relationship to patient: Self    Best call back number:   Telephone Information:   Mobile [phone]        Chief complaint: LEFT SIDE SPASMS    Patient directed to call 911 or go to their nearest emergency room.     Patient verbalized understanding: [x] Yes  [] No  If no, why?    Additional notes:  TOLD PT HE MAY NEED TO GO TO THE ED AND THEN TRANSFERRED CALL TO OFFICE.

## 2025-04-23 ENCOUNTER — TELEPHONE (OUTPATIENT)
Age: 55
End: 2025-04-23
Payer: COMMERCIAL

## 2025-05-02 ENCOUNTER — OFFICE VISIT (OUTPATIENT)
Dept: UROLOGY | Facility: CLINIC | Age: 55
End: 2025-05-02
Payer: COMMERCIAL

## 2025-05-02 VITALS — WEIGHT: 192 LBS | BODY MASS INDEX: 26.01 KG/M2 | HEIGHT: 72 IN

## 2025-05-02 DIAGNOSIS — N52.9 ERECTILE DYSFUNCTION, UNSPECIFIED ERECTILE DYSFUNCTION TYPE: ICD-10-CM

## 2025-05-02 DIAGNOSIS — R39.9 LOWER URINARY TRACT SYMPTOMS (LUTS): Primary | ICD-10-CM

## 2025-05-02 PROCEDURE — 1160F RVW MEDS BY RX/DR IN RCRD: CPT | Performed by: UROLOGY

## 2025-05-02 PROCEDURE — 1159F MED LIST DOCD IN RCRD: CPT | Performed by: UROLOGY

## 2025-05-02 PROCEDURE — 99214 OFFICE O/P EST MOD 30 MIN: CPT | Performed by: UROLOGY

## 2025-05-02 RX ORDER — CLINDAMYCIN HYDROCHLORIDE 300 MG/1
CAPSULE ORAL
COMMUNITY
Start: 2025-02-14

## 2025-05-02 NOTE — PROGRESS NOTES
Follow Up Office Visit      Patient Name: Claude Masterosn  : 1970   MRN: [id number]     Chief Complaint:    Chief Complaint   Patient presents with    Lower urinary tract symptoms (LUTS)       History of Present Illness: Claude Masterson is a 54 y.o. male who presents today for annual follow-up secondary to history of lower urinary tract symptoms and erectile dysfunction.  He reports stable urinary symptoms today.  IPSS 9 on alpha-blocker therapy.    Subjective      Review of System: Review of Systems   Genitourinary:  Negative for decreased urine volume, difficulty urinating, dysuria, enuresis, flank pain, frequency, hematuria and urgency.      I have reviewed the ROS documented by my clinical staff, updated as appropriate and I agree. Seth Cruz MD    I have reviewed and the following portions of the patient's history were updated as appropriate: past family history, past medical history, past social history, past surgical history and problem list.    Medications:     Current Outpatient Medications:     amantadine (SYMMETREL) 100 MG tablet, , Disp: , Rfl:     amLODIPine (NORVASC) 10 MG tablet, Take 1 tablet by mouth Daily., Disp: , Rfl:     aspirin 81 MG EC tablet, Take 1 tablet by mouth Daily., Disp: , Rfl:     atorvastatin (LIPITOR) 20 MG tablet, TAKE 1 TABLET BY MOUTH DAILY, Disp: 90 tablet, Rfl: 2    baclofen (LIORESAL) 10 MG tablet, Take 1 tablet by mouth 3 (Three) Times a Day., Disp: 90 tablet, Rfl: 3    clindamycin (CLEOCIN) 300 MG capsule, , Disp: , Rfl:     clopidogrel (PLAVIX) 75 MG tablet, Take 1 tablet by mouth Daily., Disp: , Rfl:     Diclofenac Sodium (VOLTAREN) 1 % gel gel, APPLY 2 GRAMS TOPICALLY TO THE AFFECTED AREA 2 TO 3 TIMES DAILY AS NEEDED FOR PAIN, Disp: , Rfl:     famotidine (PEPCID) 20 MG tablet, Take 1 tablet by mouth Daily., Disp: , Rfl:     gabapentin (NEURONTIN) 400 MG capsule, Take 1 capsule 3 times a day by oral route., Disp: , Rfl:     hydrOXYzine (ATARAX) 25 MG tablet,  Take 1 tablet by mouth 2 (Two) Times a Day., Disp: , Rfl:     latanoprost (XALATAN) 0.005 % ophthalmic solution, Administer 1 drop to both eyes Daily., Disp: , Rfl:     lisinopril (PRINIVIL,ZESTRIL) 10 MG tablet, Take 1 tablet by mouth Daily., Disp: , Rfl:     nortriptyline (PAMELOR) 25 MG capsule, Take 2 capsules by mouth Every Night., Disp: 60 capsule, Rfl: 11    oxyCODONE-acetaminophen (PERCOCET)  MG per tablet, , Disp: , Rfl:     sildenafil (REVATIO) 20 MG tablet, Take 5 tablets by mouth Daily As Needed (erectile dysfunction)., Disp: 30 tablet, Rfl: 3    tamsulosin (FLOMAX) 0.4 MG capsule 24 hr capsule, Take 1 capsule by mouth Daily., Disp: 90 capsule, Rfl: 1    timolol (TIMOPTIC) 0.5 % ophthalmic solution, Administer 1 drop to both eyes 2 (Two) Times a Day., Disp: , Rfl:     amLODIPine (NORVASC) 5 MG tablet, Take 1 tablet by mouth Daily. (Patient not taking: Reported on 1/23/2025), Disp: , Rfl:     Allergies:   Allergies   Allergen Reactions    Penicillins Hives    Sulfa Antibiotics Hives     Unsure if it was a specific sulfa drug or all sulfa drugs    Hydrocodone-Acetaminophen Rash       IPSS Questionnaire (AUA-7):  Over the past month…    1)  Incomplete Emptying  How often have you had a sensation of not emptying your bladder?  1 - Less than 1 time in 5   2)  Frequency  How often have you had to urinate less than every two hours? 2 - Less than half the time   3)  Intermittency  How often have you found you stopped and started again several times when you urinated?  2 - Less than half the time   4) Urgency  How often have you found it difficult to postpone urination?  1 - Less than 1 time in 5   5) Weak Stream  How often have you had a weak urinary stream?  1 - Less than 1 time in 5   6) Straining  How often have you had to push or strain to begin urination?  1 - Less than 1 time in 5   7) Nocturia  How many times did you typically get up at night to urinate?  1 - 1 time   Total Score:  9       Quality  "of life due to urinary symptoms:  If you were to spend the rest of your life with your urinary condition the way it is now, how would you feel about that? 3-Mixed   Urine Leakage (Incontinence) 0-No Leakage       Objective     Physical Exam:   Vital Signs:   Vitals:    05/02/25 1108   Weight: 87.1 kg (192 lb)   Height: 182.9 cm (72.01\")     Body mass index is 26.03 kg/m².     Physical Exam  Vitals and nursing note reviewed.   Constitutional:       Appearance: Normal appearance.   HENT:      Head: Normocephalic and atraumatic.   Cardiovascular:      Comments: Well perfused  Pulmonary:      Effort: Pulmonary effort is normal.   Abdominal:      General: Abdomen is flat.      Palpations: Abdomen is soft.   Musculoskeletal:         General: Normal range of motion.   Skin:     General: Skin is warm and dry.   Neurological:      General: No focal deficit present.      Mental Status: He is alert and oriented to person, place, and time. Mental status is at baseline.   Psychiatric:         Mood and Affect: Mood normal.         Behavior: Behavior normal.         Thought Content: Thought content normal.         Judgment: Judgment normal.             Labs:   Brief Urine Lab Results  (Last result in the past 365 days)        Color   Clarity   Blood   Leuk Est   Nitrite   Protein   CREAT   Urine HCG        11/20/24 1135 Yellow   Clear   Negative   Negative   Negative   Negative                        Lab Results   Component Value Date    GLUCOSE 88 11/20/2024    CALCIUM 8.6 11/20/2024     11/20/2024    K 3.9 11/20/2024    CO2 22.0 11/20/2024     11/20/2024    BUN 7 11/20/2024    CREATININE 1.06 11/20/2024    BCR 6.6 (L) 11/20/2024    ANIONGAP 11.0 11/20/2024       Lab Results   Component Value Date    WBC 6.88 01/31/2025    HGB 13.9 01/31/2025    HCT 42.3 01/31/2025    MCV 89 01/31/2025     01/31/2025       Images:   XR Foot 3+ View Right  Result Date: 4/7/2025  1. Healing Weil osteotomies of the second and " third metatarsal with unchanged osteotomy alignment and no hardware complication. 2. Hammertoe correction of the second through fifth digit. 3. Old healed osteotomies of the great toe proximal phalanx and first metatarsal. 4. Hindfoot valgus and pes planus. CRITICAL RESULT:   No. COMMUNICATION: Per this written report. Drafted by Viral Meade MD on 4/7/2025 11:01 AM Final report signed by Viral Meade MD on 4/7/2025 11:03 AM    XR Foot 3+ View Right  Result Date: 3/3/2025  Expected postoperative changes of Weil osteotomies of the second and third metatarsal and hammertoe correction of the second through fifth digit. CRITICAL RESULT:   No. COMMUNICATION: Per this written report. Drafted by Viral Meade MD on 3/3/2025 9:54 AM Final report signed by Viral Meade MD on 3/3/2025 9:56 AM      Measures:   Tobacco:   Claude DSOUZA Masterson  reports that he has never smoked. He has never been exposed to tobacco smoke. He has never used smokeless tobacco. I have educated him on the risk of diseases from using tobacco product  Assessment / Plan      Assessment/Plan:   54 y.o. male is seen today for follow up secondary to history of lower urinary tract symptoms and erectile dysfunction.  He reports stable symptom status today.  IPSS 9.  We will continue with medical management, alpha-blocker therapy and PDE 5 inhibitor.  Risk benefits alternative medication discussed.  Follow-up in 1 year for symptom check    Diagnoses and all orders for this visit:    1. Lower urinary tract symptoms (LUTS) (Primary)  -     tamsulosin (FLOMAX) 0.4 MG capsule 24 hr capsule; Take 1 capsule by mouth Daily.  Dispense: 90 capsule; Refill: 1    2. Erectile dysfunction, unspecified erectile dysfunction type         Follow Up:   Return in about 1 year (around 5/2/2026) for Recheck.     I spent approximately 30 minutes providing clinical care for this patient; including review of patient's chart and provider documentation, face to face time  spent with patient in examination room (obtaining history, performing physical exam, discussing diagnosis and management options), placing orders, and completing patient documentation.     Seth Cruz MD  [facility] [place]

## 2025-05-08 PROBLEM — N52.9 ERECTILE DYSFUNCTION: Status: ACTIVE | Noted: 2025-05-08

## 2025-05-08 PROBLEM — R39.9 LOWER URINARY TRACT SYMPTOMS (LUTS): Status: ACTIVE | Noted: 2025-05-08

## 2025-05-08 RX ORDER — TAMSULOSIN HYDROCHLORIDE 0.4 MG/1
1 CAPSULE ORAL DAILY
Qty: 90 CAPSULE | Refills: 1 | Status: SHIPPED | OUTPATIENT
Start: 2025-05-08